# Patient Record
Sex: FEMALE | Race: WHITE | NOT HISPANIC OR LATINO | Employment: OTHER | URBAN - METROPOLITAN AREA
[De-identification: names, ages, dates, MRNs, and addresses within clinical notes are randomized per-mention and may not be internally consistent; named-entity substitution may affect disease eponyms.]

---

## 2017-06-28 ENCOUNTER — TRANSCRIBE ORDERS (OUTPATIENT)
Dept: ADMINISTRATIVE | Facility: HOSPITAL | Age: 77
End: 2017-06-28

## 2017-06-28 DIAGNOSIS — Z13.820 SCREENING FOR OSTEOPOROSIS: Primary | ICD-10-CM

## 2017-07-18 ENCOUNTER — HOSPITAL ENCOUNTER (OUTPATIENT)
Dept: RADIOLOGY | Age: 77
Discharge: HOME | End: 2017-07-18
Payer: MEDICARE

## 2017-07-18 DIAGNOSIS — Z13.820 SCREENING FOR OSTEOPOROSIS: ICD-10-CM

## 2017-07-18 DIAGNOSIS — Z12.31 ENCOUNTER FOR SCREENING MAMMOGRAM FOR MALIGNANT NEOPLASM OF BREAST: ICD-10-CM

## 2017-07-18 PROCEDURE — G0202 SCR MAMMO BI INCL CAD: HCPCS

## 2017-07-18 PROCEDURE — 77080 DXA BONE DENSITY AXIAL: CPT

## 2017-09-15 PROCEDURE — 88305 TISSUE EXAM BY PATHOLOGIST: CPT | Performed by: INTERNAL MEDICINE

## 2017-09-16 ENCOUNTER — LAB REQUISITION (OUTPATIENT)
Dept: LAB | Facility: HOSPITAL | Age: 77
End: 2017-09-16
Payer: MEDICARE

## 2017-09-16 DIAGNOSIS — D12.5 BENIGN NEOPLASM OF SIGMOID COLON: ICD-10-CM

## 2017-09-16 DIAGNOSIS — Z86.010 HISTORY OF COLONIC POLYPS: ICD-10-CM

## 2017-09-16 DIAGNOSIS — K57.30 DIVERTICULOSIS OF LARGE INTESTINE WITHOUT PERFORATION OR ABSCESS WITHOUT BLEEDING: ICD-10-CM

## 2017-11-21 ENCOUNTER — HOSPITAL ENCOUNTER (EMERGENCY)
Facility: HOSPITAL | Age: 77
Discharge: HOME | End: 2017-11-21
Attending: EMERGENCY MEDICINE | Admitting: EMERGENCY MEDICINE
Payer: MEDICARE

## 2017-11-21 VITALS
RESPIRATION RATE: 22 BRPM | HEIGHT: 66 IN | SYSTOLIC BLOOD PRESSURE: 168 MMHG | BODY MASS INDEX: 33.41 KG/M2 | DIASTOLIC BLOOD PRESSURE: 76 MMHG | WEIGHT: 207.89 LBS | HEART RATE: 56 BPM | OXYGEN SATURATION: 93 % | TEMPERATURE: 98.2 F

## 2017-11-21 DIAGNOSIS — I48.20 ATRIAL FIBRILLATION, CHRONIC (HCC): ICD-10-CM

## 2017-11-21 DIAGNOSIS — R42 DIZZY SPELLS: Primary | ICD-10-CM

## 2017-11-21 DIAGNOSIS — I16.0 HYPERTENSIVE URGENCY: ICD-10-CM

## 2017-11-21 LAB
ANION GAP SERPL CALCULATED.3IONS-SCNC: 12 MMOL/L (ref 4–13)
APTT PPP: 41 SECONDS (ref 23–35)
BASOPHILS # BLD AUTO: 0.05 THOUSANDS/ΜL (ref 0–0.1)
BASOPHILS NFR BLD AUTO: 1 % (ref 0–1)
BUN SERPL-MCNC: 15 MG/DL (ref 5–25)
CALCIUM SERPL-MCNC: 8.8 MG/DL (ref 8.3–10.1)
CHLORIDE SERPL-SCNC: 105 MMOL/L (ref 100–108)
CO2 SERPL-SCNC: 25 MMOL/L (ref 21–32)
CREAT SERPL-MCNC: 0.83 MG/DL (ref 0.6–1.3)
EOSINOPHIL # BLD AUTO: 0.24 THOUSAND/ΜL (ref 0–0.61)
EOSINOPHIL NFR BLD AUTO: 3 % (ref 0–6)
ERYTHROCYTE [DISTWIDTH] IN BLOOD BY AUTOMATED COUNT: 13.6 % (ref 11.6–15.1)
GFR SERPL CREATININE-BSD FRML MDRD: 69 ML/MIN/1.73SQ M
GLUCOSE SERPL-MCNC: 136 MG/DL (ref 65–140)
HCT VFR BLD AUTO: 43.7 % (ref 34.8–46.1)
HGB BLD-MCNC: 14.2 G/DL (ref 11.5–15.4)
INR PPP: 2.25 (ref 0.86–1.16)
LYMPHOCYTES # BLD AUTO: 1.63 THOUSANDS/ΜL (ref 0.6–4.47)
LYMPHOCYTES NFR BLD AUTO: 17 % (ref 14–44)
MCH RBC QN AUTO: 29.1 PG (ref 26.8–34.3)
MCHC RBC AUTO-ENTMCNC: 32.5 G/DL (ref 31.4–37.4)
MCV RBC AUTO: 90 FL (ref 82–98)
MONOCYTES # BLD AUTO: 0.71 THOUSAND/ΜL (ref 0.17–1.22)
MONOCYTES NFR BLD AUTO: 7 % (ref 4–12)
NEUTROPHILS # BLD AUTO: 6.91 THOUSANDS/ΜL (ref 1.85–7.62)
NEUTS SEG NFR BLD AUTO: 72 % (ref 43–75)
NT-PROBNP SERPL-MCNC: 1149 PG/ML
PLATELET # BLD AUTO: 243 THOUSANDS/UL (ref 149–390)
PMV BLD AUTO: 10.7 FL (ref 8.9–12.7)
POTASSIUM SERPL-SCNC: 3.7 MMOL/L (ref 3.5–5.3)
PROTHROMBIN TIME: 25.7 SECONDS (ref 12.1–14.4)
RBC # BLD AUTO: 4.88 MILLION/UL (ref 3.81–5.12)
SODIUM SERPL-SCNC: 142 MMOL/L (ref 136–145)
TROPONIN I SERPL-MCNC: <0.02 NG/ML
TSH SERPL DL<=0.05 MIU/L-ACNC: 4.38 UIU/ML (ref 0.36–3.74)
WBC # BLD AUTO: 9.54 THOUSAND/UL (ref 4.31–10.16)

## 2017-11-21 PROCEDURE — 93005 ELECTROCARDIOGRAM TRACING: CPT | Performed by: EMERGENCY MEDICINE

## 2017-11-21 PROCEDURE — 85610 PROTHROMBIN TIME: CPT | Performed by: EMERGENCY MEDICINE

## 2017-11-21 PROCEDURE — 99285 EMERGENCY DEPT VISIT HI MDM: CPT

## 2017-11-21 PROCEDURE — 80048 BASIC METABOLIC PNL TOTAL CA: CPT | Performed by: EMERGENCY MEDICINE

## 2017-11-21 PROCEDURE — 83880 ASSAY OF NATRIURETIC PEPTIDE: CPT | Performed by: EMERGENCY MEDICINE

## 2017-11-21 PROCEDURE — 84443 ASSAY THYROID STIM HORMONE: CPT | Performed by: EMERGENCY MEDICINE

## 2017-11-21 PROCEDURE — 85025 COMPLETE CBC W/AUTO DIFF WBC: CPT | Performed by: EMERGENCY MEDICINE

## 2017-11-21 PROCEDURE — 36415 COLL VENOUS BLD VENIPUNCTURE: CPT | Performed by: EMERGENCY MEDICINE

## 2017-11-21 PROCEDURE — 85730 THROMBOPLASTIN TIME PARTIAL: CPT | Performed by: EMERGENCY MEDICINE

## 2017-11-21 PROCEDURE — 84484 ASSAY OF TROPONIN QUANT: CPT | Performed by: EMERGENCY MEDICINE

## 2017-11-21 RX ORDER — METOPROLOL TARTRATE 50 MG/1
50 TABLET, FILM COATED ORAL EVERY 12 HOURS SCHEDULED
COMMUNITY

## 2017-11-21 RX ORDER — ATORVASTATIN CALCIUM 40 MG/1
40 TABLET, FILM COATED ORAL DAILY
COMMUNITY

## 2017-11-21 NOTE — DISCHARGE INSTRUCTIONS
Dizziness, Ambulatory Care   Shashi M: Dizziness  In: Arch Rupal, Marquis RM, [employer], et al  [place] [facility]  [address] Emergency Medicine Consult, 4th ed  [address] [facility] [place], [place], 2011  Hakeem LEAL & Jewel EE: Dizziness in the elderly  Otolaryngol Clin [facility] 2011; 33(0):711-094  Judi KEENE & Everett B: Dizzy and confused: a step-by-step evaluation of the clinician's favorite chief complaint  [address] [place] [address] 2010; 65(8):846-790  John Bela & Odette R: Dizziness and vertigo: emergencies and management  Neurol Clin 2012; 30(1):61-74  © 2014 [address] is for End User's use only and may not be sold, redistributed or otherwise used for commercial purposes  All illustrations and images included in CareNotes® are the copyrighted property of [employer] , [employer]  or Andrea Nielsen  The above information is an  only  It is not intended as medical advice for individual conditions or treatments  Talk to your doctor, nurse or pharmacist before following any medical regimen to see if it is safe and effective for you

## 2017-11-21 NOTE — ED PROVIDER NOTES
History  Chief Complaint   Patient presents with    Dizziness     Per EMS "Pt had 2 spells of dizziness today  BPs running 200's/120's, just BP medication changed, they lowered her dosages " Per Pt "dizzy spells have been happening for the last few mornings when I get out of bed" Pt also c/o SOB       History provided by:  Patient and EMS personnel  Dizziness   Quality:  Lightheadedness  Severity:  Moderate  Onset quality:  Gradual  Duration:  2 days  Timing:  Intermittent  Progression:  Waxing and waning  Chronicity:  New  Context: standing up    Relieved by:  Being still  Worsened by:  Being still, standing up and turning head  Ineffective treatments:  None tried  Associated symptoms: no blood in stool, no chest pain, no diarrhea, no headaches, no hearing loss, no nausea, no palpitations, no shortness of breath, no vision changes, no vomiting and no weakness        Prior to Admission Medications   Prescriptions Last Dose Informant Patient Reported? Taking?   atorvastatin (LIPITOR) 40 mg tablet   Yes Yes   Sig: Take 40 mg by mouth daily   furosemide (LASIX) 20 mg tablet   Yes Yes   Sig: Take 20 mg by mouth daily  levothyroxine 50 mcg tablet   Yes Yes   Sig: Take 50 mcg by mouth daily  lisinopril (ZESTRIL) 20 mg tablet   Yes Yes   Sig: Take 20 mg by mouth daily  metoprolol tartrate (LOPRESSOR) 50 mg tablet   Yes Yes   Sig: Take 50 mg by mouth every 12 (twelve) hours   perphenazine-amitriptyline 2-10 MG TABS   Yes Yes   Sig: Take 2 tablets by mouth daily  potassium chloride (K-DUR) 10 mEq tablet   Yes Yes   Sig: Take 10 mEq by mouth daily  warfarin (COUMADIN) 4 mg tablet   Yes Yes   Sig: Take 4 mg by mouth daily   Monday-friday      Facility-Administered Medications: None       Past Medical History:   Diagnosis Date    Anxiety     Asthma     Atrial fibrillation (HCC)     Cardiac disease     CHF [facility]congestive [facility] failure) ([facility] [address] )     Depression 5/22/2016    HLD (hyperlipidemia) 5/22/2016    HTN (hypertension), benign 5/22/2016    Hyperlipidemia     Hypertension     Hypothyroidism 5/22/2016    Stroke [facility])        Past Surgical History:   Procedure Laterality Date    HYSTERECTOMY      TONSILLECTOMY         History reviewed  No pertinent family history  I have reviewed and agree with the history as documented  Social History   Substance Use Topics    Smoking status: Former Smoker     Types: Cigarettes     Quit date: 1985    Smokeless tobacco: Never Used    Alcohol use No        Review of Systems   Constitutional: Negative for activity change, chills, diaphoresis and fever  HENT: Negative for congestion, hearing loss, sinus pressure and sore throat  Eyes: Negative for pain and visual disturbance  Respiratory: Negative for cough, chest tightness, shortness of breath, wheezing and stridor  Cardiovascular: Negative for chest pain and palpitations  Gastrointestinal: Negative for abdominal distention, abdominal pain, blood in stool, constipation, diarrhea, nausea and vomiting  Genitourinary: Negative for dysuria and frequency  Musculoskeletal: Negative for neck pain and neck stiffness  Skin: Negative for rash  Neurological: Positive for dizziness  Negative for speech difficulty, weakness, light-headedness, numbness and headaches  Physical Exam  ED Triage Vitals [11/21/17 1516]   Temperature Pulse Respirations Blood Pressure SpO2   98 2 °F (36 8 °C) 68 22 (!) 184/75 94 %      Temp Source Heart Rate Source Patient Position - Orthostatic VS BP Location FiO2 (%)   Oral Monitor Lying Right arm --      Pain Score       --           Orthostatic Vital Signs  Vitals:    11/21/17 1516 11/21/17 1530 11/21/17 1600   BP: (!) 184/75 (!) 174/79 168/76   Pulse: 68 56 56   Patient Position - Orthostatic VS: Lying Lying Lying       Physical Exam   Constitutional: She is oriented to person, place, and time  She appears well-developed  No distress  HENT:   Head: Normocephalic and atraumatic  Eyes: Pupils are equal, round, and reactive to light  Neck: Normal range of motion  Neck supple  No tracheal deviation present  Cardiovascular: Normal rate, normal heart sounds and intact distal pulses  An irregularly irregular rhythm present  No murmur heard  Pulmonary/Chest: Effort normal and breath sounds normal  No stridor  No respiratory distress  Abdominal: Soft  She exhibits no distension  There is no tenderness  There is no rebound and no guarding  Musculoskeletal: Normal range of motion  Neurological: She is alert and oriented to person, place, and time  Skin: Skin is warm and dry  She is not diaphoretic  No erythema  No pallor  Psychiatric: She has a normal mood and affect  Vitals reviewed  ED Medications  Medications - No data to display    Diagnostic Studies  Results Reviewed     Procedure Component Value Units Date/Time    APTT [[id number]]  (Abnormal) Collected:  11/21/17 1528    Lab Status:  Final result Specimen:  Blood from Arm, Left Updated:  11/21/17 1610     PTT 41 (H) seconds     Narrative: Therapeutic Heparin Range = 60-90 seconds    Protime-INR [[id number]]  (Abnormal) Collected:  11/21/17 1528    Lab Status:  Final result Specimen:  Blood from Arm, Left Updated:  11/21/17 1610     Protime 25 7 (H) seconds      INR 2 25 (H)    Basic metabolic panel [[id number]] Collected:  11/21/17 1528    Lab Status:  Final result Specimen:  Blood from Arm, Left Updated:  11/21/17 1601     Sodium 142 mmol/L      Potassium 3 7 mmol/L      Chloride 105 mmol/L      CO2 25 mmol/L      Anion Gap 12 mmol/L      BUN 15 mg/dL      Creatinine 0 83 mg/dL      Glucose 136 mg/dL      Calcium 8 8 mg/dL      eGFR 69 ml/min/1 73sq m     Narrative:         National Kidney Disease Education Program recommendations are as follows:  GFR calculation is accurate only with a steady state creatinine  Chronic Kidney disease less than 60 ml/min/1 73 sq  meters  Kidney failure less than 15 ml/min/1 73 sq  meters  TSH [[id number]]  (Abnormal) Collected:  11/21/17 1528    Lab Status:  Final result Specimen:  Blood from Arm, Left Updated:  11/21/17 1601     TSH 3RD GENERATON 4 375 (H) uIU/mL     Narrative:         Patients undergoing fluorescein dye angiography may retain small amounts of fluorescein in the body for 48-72 hours post procedure  Samples containing fluorescein can produce falsely depressed TSH values  If the patient had this procedure,a specimen should be resubmitted post fluorescein clearance  The recommended reference ranges for TSH during pregnancy are as follows:  First trimester 0 1 to 2 5 uIU/mL  Second trimester  0 2 to 3 0 uIU/mL  Third trimester 0 3 to 3 0 uIU/m      B-type natriuretic peptide [[id number]]  (Abnormal) Collected:  11/21/17 1528    Lab Status:  Final result Specimen:  Blood from Arm, Left Updated:  11/21/17 1601     NT-proBNP 1,149 (H) pg/mL     Troponin I [[id number]]  (Normal) Collected:  11/21/17 1528    Lab Status:  Final result Specimen:  Blood from Arm, Left Updated:  11/21/17 1555     Troponin I <0 02 ng/mL     Narrative:         [employer] Chemistry analyzer 99% cutoff is > 0 04 ng/mL in network labs    o cTnI 99% cutoff is useful only when applied to patients in the clinical setting of myocardial ischemia  o cTnI 99% cutoff should be interpreted in the context of clinical history, ECG findings and possibly cardiac imaging to establish correct diagnosis  o cTnI 99% cutoff may be suggestive but clearly not indicative of a coronary event without the clinical setting of myocardial ischemia      CBC and differential [[id number]]  (Normal) Collected:  11/21/17 1528    Lab Status:  Final result Specimen:  Blood from Arm, Left Updated:  11/21/17 1543     WBC 9 54 Thousand/uL      RBC 4 88 Million/uL      Hemoglobin 14 2 g/dL      Hematocrit 43 7 %      MCV 90 fL      MCH 29 1 pg      MCHC 32 5 g/dL      RDW 13 6 %      MPV 10 7 fL      Platelets 087 Thousands/uL      Neutrophils Relative 72 %      Lymphocytes Relative 17 %      Monocytes Relative 7 %      Eosinophils Relative 3 %      Basophils Relative 1 %      Neutrophils Absolute 6 91 Thousands/µL      Lymphocytes Absolute 1 63 Thousands/µL      Monocytes Absolute 0 71 Thousand/µL      Eosinophils Absolute 0 24 Thousand/µL      Basophils Absolute 0 05 Thousands/µL                  No orders to display              Procedures  ECG 12 Lead Documentation  Date/Time: 11/21/2017 3:24 PM  Performed by: Courtney Vanegas  Authorized by: Courtney Vanegas     ECG reviewed by me, the ED Provider: yes    Patient location:  ED  Previous ECG:     Previous ECG:  Compared to current    Comparison ECG info:  5 21 2016    Similarity:  No change  Interpretation:     Interpretation: non-specific    Rate:     ECG rate:  62    ECG rate assessment: normal    Rhythm:     Rhythm: atrial fibrillation    Ectopy:     Ectopy: none    QRS:     QRS axis:  Left    QRS intervals: Wide  Conduction:     Conduction: abnormal      Abnormal conduction: complete LBBB    ST segments:     ST segments:  Non-specific  T waves:     T waves: non-specific               Phone Contacts  ED Phone Contact    ED Course  ED Course as of Nov 21 1625   Tue Nov 21, 2017   1624   Patient reports complete resolution of symptoms feeling well blood pressure has decreased steadily since being here, plan will discharge                                MDM  Number of Diagnoses or Management Options  Atrial fibrillation, chronic ([facility] [address] ): minor  Dizzy spells: new and requires workup  Hypertensive urgency: new and requires workup  Diagnosis management comments: 53-year-old female presents home, intermittent episodes of dizziness over the past 2 days  Currently feels completely asymptomatic    No falls no trauma no headache no chest pain     DDx includes but is not limited to:   Electrolyte abnormality, anemia, less likely cardiac in etiology will check      Initial ED Plan:   Blood  Work,  if workup unremarkable patient feels comfortable being discharged       Amount and/or Complexity of Data Reviewed  Clinical lab tests: ordered and reviewed  Decide to obtain previous medical records or to obtain history from someone other than the patient: yes  Obtain history from someone other than the patient: yes  Review and summarize past medical records: yes  Independent visualization of images, tracings, or specimens: yes      CritCare Time    Disposition  Final diagnoses:   Dizzy spells   [facility] ([facility] )   Hypertensive urgency     Time reflects when diagnosis was documented in both MDM as applicable and the Disposition within this note     Time User Action Codes Description Comment    11/21/2017  4:24 PM Odell Saint Add [R42] Dizzy spells     11/21/2017  4:25 PM Karla SCOTT Add [I48 2] Atrial fibrillation, chronic ([facility] )     11/21/2017  4:25 PM Odell Saint Add [I16 0] Hypertensive urgency       ED Disposition     ED Disposition Condition Comment    Discharge  [place] discharge to home/self care  Condition at discharge: Good        Follow-up Information     Follow up With Specialties Details Why Rohan Schaefer MD Internal Medicine Call To arrange for the next available appointment [address]   [phone]  [phone]          Patient's Medications   Discharge Prescriptions    No medications on file     No discharge procedures on file      ED Provider  Electronically Signed by           Shamar Salinas DO  11/21/17 9137

## 2017-11-22 LAB
ATRIAL RATE: 69 BPM
QRS AXIS: -69 DEGREES
QRSD INTERVAL: 150 MS
QT INTERVAL: 456 MS
QTC INTERVAL: 464 MS
T WAVE AXIS: 101 DEGREES
VENTRICULAR RATE: 62 BPM

## 2018-07-24 ENCOUNTER — HOSPITAL ENCOUNTER (OUTPATIENT)
Dept: RADIOLOGY | Age: 78
Discharge: HOME | End: 2018-07-24
Payer: MEDICARE

## 2018-07-24 DIAGNOSIS — Z12.31 ENCOUNTER FOR SCREENING MAMMOGRAM FOR MALIGNANT NEOPLASM OF BREAST: ICD-10-CM

## 2018-07-24 PROCEDURE — 77067 SCR MAMMO BI INCL CAD: CPT

## 2018-07-24 PROCEDURE — 77063 BREAST TOMOSYNTHESIS BI: CPT

## 2019-01-23 NOTE — ED NOTES
Pt awake and alert, no distress noted, no other questions upon d/c     April JAYNE Steiner, RN  11/21/17 3830 Yes

## 2019-07-26 ENCOUNTER — HOSPITAL ENCOUNTER (OUTPATIENT)
Dept: RADIOLOGY | Age: 79
Discharge: HOME | End: 2019-07-26
Payer: COMMERCIAL

## 2019-07-26 VITALS — WEIGHT: 187 LBS | BODY MASS INDEX: 30.05 KG/M2 | HEIGHT: 66 IN

## 2019-07-26 DIAGNOSIS — Z12.31 ENCOUNTER FOR SCREENING MAMMOGRAM FOR MALIGNANT NEOPLASM OF BREAST: ICD-10-CM

## 2019-07-26 PROCEDURE — 77063 BREAST TOMOSYNTHESIS BI: CPT

## 2019-07-26 PROCEDURE — 77067 SCR MAMMO BI INCL CAD: CPT

## 2020-07-29 ENCOUNTER — HOSPITAL ENCOUNTER (OUTPATIENT)
Dept: RADIOLOGY | Age: 80
Discharge: HOME | End: 2020-07-29

## 2020-07-29 VITALS — WEIGHT: 185 LBS | HEIGHT: 65 IN | BODY MASS INDEX: 30.82 KG/M2

## 2020-07-29 DIAGNOSIS — Z12.31 ENCOUNTER FOR SCREENING MAMMOGRAM FOR MALIGNANT NEOPLASM OF BREAST: ICD-10-CM

## 2020-07-29 PROCEDURE — 77067 SCR MAMMO BI INCL CAD: CPT

## 2020-07-29 PROCEDURE — 77063 BREAST TOMOSYNTHESIS BI: CPT

## 2021-02-14 ENCOUNTER — IMMUNIZATIONS (OUTPATIENT)
Dept: FAMILY MEDICINE CLINIC | Facility: HOSPITAL | Age: 81
End: 2021-02-14

## 2021-02-14 DIAGNOSIS — Z23 ENCOUNTER FOR IMMUNIZATION: Primary | ICD-10-CM

## 2021-02-14 PROCEDURE — 91300 SARS-COV-2 / COVID-19 MRNA VACCINE (PFIZER-BIONTECH) 30 MCG: CPT

## 2021-02-14 PROCEDURE — 0001A SARS-COV-2 / COVID-19 MRNA VACCINE (PFIZER-BIONTECH) 30 MCG: CPT

## 2021-03-05 ENCOUNTER — IMMUNIZATIONS (OUTPATIENT)
Dept: FAMILY MEDICINE CLINIC | Facility: HOSPITAL | Age: 81
End: 2021-03-05

## 2021-03-05 DIAGNOSIS — Z23 ENCOUNTER FOR IMMUNIZATION: Primary | ICD-10-CM

## 2021-03-05 PROCEDURE — 0002A SARS-COV-2 / COVID-19 MRNA VACCINE (PFIZER-BIONTECH) 30 MCG: CPT

## 2021-03-05 PROCEDURE — 91300 SARS-COV-2 / COVID-19 MRNA VACCINE (PFIZER-BIONTECH) 30 MCG: CPT

## 2021-08-27 ENCOUNTER — HOSPITAL ENCOUNTER (OUTPATIENT)
Dept: RADIOLOGY | Age: 81
Discharge: HOME | End: 2021-08-27
Payer: MEDICARE

## 2021-08-27 VITALS — BODY MASS INDEX: 31.32 KG/M2 | HEIGHT: 65 IN | WEIGHT: 188 LBS

## 2021-08-27 DIAGNOSIS — Z12.31 ENCOUNTER FOR SCREENING MAMMOGRAM FOR MALIGNANT NEOPLASM OF BREAST: ICD-10-CM

## 2021-08-27 DIAGNOSIS — Z78.0 ASYMPTOMATIC MENOPAUSAL STATE: ICD-10-CM

## 2021-08-27 PROCEDURE — 77080 DXA BONE DENSITY AXIAL: CPT

## 2021-08-27 PROCEDURE — 77067 SCR MAMMO BI INCL CAD: CPT

## 2021-08-27 PROCEDURE — 77063 BREAST TOMOSYNTHESIS BI: CPT

## 2021-11-16 ENCOUNTER — IMMUNIZATIONS (OUTPATIENT)
Dept: FAMILY MEDICINE CLINIC | Facility: HOSPITAL | Age: 81
End: 2021-11-16

## 2021-11-16 DIAGNOSIS — Z23 ENCOUNTER FOR IMMUNIZATION: Primary | ICD-10-CM

## 2021-11-16 PROCEDURE — 0001A COVID-19 PFIZER VACC 0.3 ML: CPT

## 2021-11-16 PROCEDURE — 91300 COVID-19 PFIZER VACC 0.3 ML: CPT

## 2023-04-02 ENCOUNTER — APPOINTMENT (OUTPATIENT)
Dept: RADIOLOGY | Facility: HOSPITAL | Age: 83
End: 2023-04-02

## 2023-04-02 ENCOUNTER — HOSPITAL ENCOUNTER (EMERGENCY)
Facility: HOSPITAL | Age: 83
Discharge: HOME | End: 2023-04-02
Attending: SURGERY

## 2023-04-02 ENCOUNTER — APPOINTMENT (EMERGENCY)
Dept: CT IMAGING | Facility: HOSPITAL | Age: 83
End: 2023-04-02

## 2023-04-02 VITALS
WEIGHT: 183.42 LBS | DIASTOLIC BLOOD PRESSURE: 76 MMHG | SYSTOLIC BLOOD PRESSURE: 184 MMHG | HEART RATE: 53 BPM | BODY MASS INDEX: 31 KG/M2 | RESPIRATION RATE: 16 BRPM | TEMPERATURE: 98 F | OXYGEN SATURATION: 94 %
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
## 2023-04-02 DIAGNOSIS — S00.03XA SCALP HEMATOMA, INITIAL ENCOUNTER: ICD-10-CM

## 2023-04-02 PROBLEM — R19.7 DIARRHEA: Status: ACTIVE | Noted: 2023-04-02

## 2023-04-02 LAB
ABO GROUP BLD: NORMAL
ANION GAP SERPL CALCULATED.3IONS-SCNC: 8 MMOL/L (ref 4–13)
APTT PPP: 43 SECONDS (ref 23–37)
ATRIAL RATE: 59 BPM
BASE EXCESS BLDA CALC-SCNC: 7 MMOL/L (ref -2–3)
BASOPHILS # BLD AUTO: 0.07 THOUSANDS/ÂΜL (ref 0–0.1)
BASOPHILS NFR BLD AUTO: 1 % (ref 0–1)
BLD GP AB SCN SERPL QL: NEGATIVE
BUN SERPL-MCNC: 19 MG/DL (ref 5–25)
CA-I BLD-SCNC: 1.19 MMOL/L (ref 1.12–1.32)
CALCIUM SERPL-MCNC: 9.9 MG/DL (ref 8.4–10.2)
CHLORIDE SERPL-SCNC: 104 MMOL/L (ref 96–108)
CO2 SERPL-SCNC: 28 MMOL/L (ref 21–32)
CREAT SERPL-MCNC: 1.02 MG/DL (ref 0.6–1.3)
EOSINOPHIL # BLD AUTO: 0.22 THOUSAND/ÂΜL (ref 0–0.61)
EOSINOPHIL NFR BLD AUTO: 2 % (ref 0–6)
ERYTHROCYTE [DISTWIDTH] IN BLOOD BY AUTOMATED COUNT: 13.2 % (ref 11.6–15.1)
GFR SERPL CREATININE-BSD FRML MDRD: 52 ML/MIN/1.73SQ M
GLUCOSE SERPL-MCNC: 132 MG/DL (ref 65–140)
GLUCOSE SERPL-MCNC: 137 MG/DL (ref 65–140)
HCO3 BLDA-SCNC: 30.9 MMOL/L (ref 24–30)
HCT VFR BLD AUTO: 42.9 % (ref 34.8–46.1)
HCT VFR BLD CALC: 40 % (ref 34.8–46.1)
HGB BLD-MCNC: 13.5 G/DL (ref 11.5–15.4)
HGB BLDA-MCNC: 13.6 G/DL (ref 11.5–15.4)
IMM GRANULOCYTES # BLD AUTO: 0.04 THOUSAND/UL (ref 0–0.2)
IMM GRANULOCYTES NFR BLD AUTO: 0 % (ref 0–2)
INR PPP: 2.68 (ref 0.84–1.19)
LYMPHOCYTES # BLD AUTO: 1.51 THOUSANDS/ÂΜL (ref 0.6–4.47)
LYMPHOCYTES NFR BLD AUTO: 17 % (ref 14–44)
MCH RBC QN AUTO: 29 PG (ref 26.8–34.3)
MCHC RBC AUTO-ENTMCNC: 31.5 G/DL (ref 31.4–37.4)
MCV RBC AUTO: 92 FL (ref 82–98)
MONOCYTES # BLD AUTO: 0.73 THOUSAND/ÂΜL (ref 0.17–1.22)
MONOCYTES NFR BLD AUTO: 8 % (ref 4–12)
NEUTROPHILS # BLD AUTO: 6.53 THOUSANDS/ÂΜL (ref 1.85–7.62)
NEUTS SEG NFR BLD AUTO: 72 % (ref 43–75)
NRBC BLD AUTO-RTO: 0 /100 WBCS
PCO2 BLD: 32 MMOL/L (ref 21–32)
PCO2 BLD: 41.6 MM HG (ref 42–50)
PH BLD: 7.48 [PH] (ref 7.3–7.4)
PLATELET # BLD AUTO: 230 THOUSANDS/UL (ref 149–390)
PMV BLD AUTO: 10.8 FL (ref 8.9–12.7)
PO2 BLD: 31 MM HG (ref 35–45)
POTASSIUM BLD-SCNC: 4.7 MMOL/L (ref 3.5–5.3)
POTASSIUM SERPL-SCNC: 3.8 MMOL/L (ref 3.5–5.3)
PROTHROMBIN TIME: 28.8 SECONDS (ref 11.6–14.5)
QRS AXIS: -72 DEGREES
QRSD INTERVAL: 150 MS
QT INTERVAL: 484 MS
QTC INTERVAL: 463 MS
RBC # BLD AUTO: 4.65 MILLION/UL (ref 3.81–5.12)
RH BLD: POSITIVE
SAO2 % BLD FROM PO2: 64 % (ref 60–85)
SODIUM BLD-SCNC: 141 MMOL/L (ref 136–145)
SODIUM SERPL-SCNC: 140 MMOL/L (ref 135–147)
SPECIMEN EXPIRATION DATE: NORMAL
SPECIMEN SOURCE: ABNORMAL
T WAVE AXIS: 98 DEGREES
VENTRICULAR RATE: 55 BPM
WBC # BLD AUTO: 9.1 THOUSAND/UL (ref 4.31–10.16)

## 2023-04-02 NOTE — ASSESSMENT & PLAN NOTE
· Patient has a history of A-fib for which she takes Coumadin daily  · PT/INR was within therapeutic limits

## 2023-04-02 NOTE — ASSESSMENT & PLAN NOTE
· Patient had a mechanical fall from standing with head strike resulting in a scalp hematoma over the left occiput  · CT scan of her head was negative for any acute intracranial abnormalities

## 2023-04-02 NOTE — H&P
[facility]  H&P Note  Name: Ariela Baltazar  MRN: [id number]  Unit/Bed#: ED-19 I Date of Admission: 4/2/2023   Date of Service: 4/2/2023 I Hospital Day: 0    Assessment/Plan   Diarrhea  Assessment & Plan  · Patient notes having recurrent episodes of watery diarrhea over the last 6 days  · Per chart review, she is being evaluated for chronic diarrhea by her PCP and is currently taking Imodium, she also has an ambulatory referral to the GI specialist  · No bloody stools, no fevers or chills  · Blood work does not show any significant electrolyte derangements  · No further work-up warranted at this time, follow-up with PCP    Scalp hematoma, initial encounter  Assessment & Plan  · Patient had a mechanical fall from standing with head strike resulting in a scalp hematoma over the left occiput  · CT scan of her head was negative for any acute intracranial abnormalities    Atrial fibrillation with RVR ([address] )  Assessment & Plan  · Patient has a history of A-fib for which she takes Coumadin daily  · PT/INR was within therapeutic limits    * Fall  Assessment & Plan  · Mechanical fall at home with head strike resulting in head contusion/scalp hematoma  · CT of the head was negative, blood work was unremarkable, patient able to ambulate without difficulty  · Patient stable for discharge home        Trauma Alert: Level B   Model of Arrival: Ambulance    Trauma Team: Attending Dr Melchor Gabriel and Residents Dr Corina Medina  Consultants:     None     History of Present Illness     Chief Complaint: Fall with head strike, scalp hematoma/head contusion  Mechanism:Fall     HPI:    Cristian Long is a [de-identified] y o  female who presents with a scalp hematoma/head contusion after a mechanical fall at home, the patient takes Coumadin for A-fib  The patient denies any other injuries or tenderness in any other areas  She denies any C-spine tenderness    Patient notes that she has had watery diarrhea over the last 6 days   No significant abdominal tenderness at this time  No nausea or vomiting  No fevers or chills  Patient's trauma work-up was unremarkable  Her blood work was also unremarkable  Patient was able to ambulate in the emergency department without any difficulty, she was stable for discharge back home  Review of Systems   Constitutional: Negative for chills and fever  HENT: Negative for congestion and sinus pain  Eyes: Negative for photophobia and visual disturbance  Respiratory: Negative for chest tightness and shortness of breath  Cardiovascular: Negative for chest pain and palpitations  Gastrointestinal: Positive for diarrhea  Negative for abdominal pain and nausea  Genitourinary: Negative for dysuria and hematuria  Musculoskeletal: Negative for arthralgias, back pain, neck pain and neck stiffness  Skin: Negative for rash and wound  Neurological: Negative for dizziness, syncope, weakness, light-headedness and headaches  Psychiatric/Behavioral: Negative for agitation and confusion  12-point, complete review of systems was reviewed and negative except as stated above       Historical Information     Past Medical History:   Diagnosis Date   • Anxiety    • Asthma    • Atrial fibrillation [facility])    • Cardiac disease    • [facility] )    • Depression 2016   • HLD (hyperlipidemia) 2016   • HTN (hypertension), benign 2016   • Hyperlipidemia    • Hypertension    • Hypothyroidism 2016   • Stroke [facility])      Past Surgical History:   Procedure Laterality Date   • BREAST EXCISIONAL BIOPSY Right     benign   • TONSILLECTOMY     • TOTAL ABDOMINAL HYSTERECTOMY Bilateral     age 48   • TOTAL ABDOMINAL HYSTERECTOMY W/ BILATERAL SALPINGOOPHORECTOMY Bilateral     age 48        Social History     Tobacco Use   • Smoking status: Former     Types: Cigarettes     Quit date:      Years since quittin 2   • Smokeless tobacco: Never   Substance Use Topics   • Alcohol use: No   • Drug use: No     Immunization History   Administered Date(s) Administered   • COVID-19 PFIZER VACCINE 0 3 ML IM 02/14/2021, 03/05/2021, 11/16/2021     Last Tetanus: unclear  Family History: Non-contributory    1  Before the illness or injury that brought you to the Emergency, did you need someone to help you on a regular basis? 0=No   2  Since the illness or injury that brought you to the Emergency, have you needed more help than usual to take care of yourself? 0=No   3  Have you been hospitalized for one or more nights during the past 6 months (excluding a stay in the Emergency Department)? 0=No   4  In general, do you see well? 0=Yes   5  In general, do you have serious problems with your memory? 0=No   6  Do you take more than three different medications everyday? 1=Yes   TOTAL   1     Did you order a geriatric consult if the score was 2 or greater?: n/a     Meds/Allergies   all current active meds have been reviewed  Allergies have not been reviewed;     Allergies   Allergen Reactions   • Penicillins Dermatitis       Objective   Initial Vitals:   Temperature: 98 °F (36 7 °C) (04/02/23 1647)  Pulse: 65 (04/02/23 1647)  Respirations: 22 (04/02/23 1647)  Blood Pressure: (!) 174/97 (04/02/23 1647)    Primary Survey:   Airway:        Status: patent;        Pre-hospital Interventions: none        Hospital Interventions: none  Breathing:        Pre-hospital Interventions: none       Effort: normal       Right breath sounds: normal       Left breath sounds: normal  Circulation:        Rhythm: irregular       Rate: regular   Right Pulses Left Pulses    R radial: 2+  R femoral: 2+       L radial: 2+  L femoral: 2+         Disability:        GCS: Eye: 4; Verbal: 5 Motor: 6 Total: 15       Right Pupil: 4 mm;  round;  reactive         Left Pupil:  4 mm;  round;  reactive      R Motor Strength L Motor Strength    R : 5/5  R dorsiflex: 5/5  R plantarflex: 5/5 L : 5/5  L dorsiflex: 5/5  L plantarflex: 5/5        Sensory:  No sensory deficit  Exposure:       Completed: Yes      Secondary Survey:  Physical Exam  Vitals and nursing note reviewed  Constitutional:       General: She is not in acute distress  HENT:      Head: Normocephalic  Contusion present  Comments: Hematoma/contusion over left occiput     Right Ear: External ear normal       Left Ear: External ear normal       Nose: No congestion or rhinorrhea  Mouth/Throat:      Mouth: Mucous membranes are moist       Pharynx: Oropharynx is clear  Eyes:      Extraocular Movements: Extraocular movements intact  Pupils: Pupils are equal, round, and reactive to light  Cardiovascular:      Rate and Rhythm: Normal rate  Rhythm irregular  Pulses: Normal pulses  Heart sounds: Normal heart sounds  Pulmonary:      Effort: Pulmonary effort is normal  No respiratory distress  Breath sounds: Normal breath sounds  Abdominal:      General: Abdomen is flat  Palpations: Abdomen is soft  Tenderness: There is no abdominal tenderness  Musculoskeletal:         General: No swelling, tenderness or signs of injury  Normal range of motion  Cervical back: Normal range of motion  No rigidity or tenderness  Skin:     General: Skin is warm  Findings: No bruising  Neurological:      General: No focal deficit present  Mental Status: She is alert and oriented to person, place, and time     Psychiatric:         Mood and Affect: Mood normal          Behavior: Behavior normal          Invasive Devices     Peripheral Intravenous Line  Duration           Peripheral IV 04/02/23 Left Antecubital <1 day              Lab Results:   Results: I have personally reviewed all pertinent laboratory/tests results, BMP/CMP:   Lab Results   Component Value Date    SODIUM 140 04/02/2023    K 3 8 04/02/2023     04/02/2023    CO2 28 04/02/2023    CO2 32 04/02/2023    BUN 19 04/02/2023    CREATININE 1 02 04/02/2023 GLUCOSE 137 04/02/2023    CALCIUM 9 9 04/02/2023    EGFR 52 04/02/2023   , CBC:   Lab Results   Component Value Date    WBC 9 10 04/02/2023    HGB 13 5 04/02/2023    HGB 13 6 04/02/2023    HCT 42 9 04/02/2023    HCT 40 04/02/2023    MCV 92 04/02/2023     04/02/2023    MCH 29 0 04/02/2023    MCHC 31 5 04/02/2023    RDW 13 2 04/02/2023    MPV 10 8 04/02/2023    NRBC 0 04/02/2023    and Coagulation:   Lab Results   Component Value Date    INR 2 68 (H) 04/02/2023       Imaging Results: I have personally reviewed pertinent reports  Chest Xray(s): negative for acute findings   FAST exam(s): negative for acute findings   CT Scan(s): negative for acute findings   Additional Xray(s): N/A     Other Studies: N/A    Code Status: Prior  Advance Directive and Living Will:      Power of :    POLST:    I have spent 45 minutes with Patient  today in which greater than 50% of this time was spent in counseling/coordination of care regarding Diagnostic results, Prognosis, Impressions, Counseling / Coordination of care, Documenting in the medical record, Reviewing / ordering tests, medicine, procedures  , Obtaining or reviewing history   and Communicating with other healthcare professionals

## 2023-04-02 NOTE — PROCEDURES
POC FAST US    Date/Time: 4/2/2023 4:57 PM  Performed by: Steven Fagan DO  Authorized by: Steven Fagan DO     Patient location:  Trauma  Other Assisting Provider: No    Procedure details:     Exam Type:  Diagnostic    Indications: blunt abdominal trauma      Assess for:  Intra-abdominal fluid and pericardial effusion    Technique: FAST      Views obtained:  Heart - Pericardial sac, RUQ - Davison's Pouch, LUQ - Splenorenal space and Suprapubic - Pouch of Hari    Image quality: diagnostic      Image availability:  Video obtained and images available in PACS  FAST Findings:     RUQ (Hepatorenal) free fluid: absent      LUQ (Splenorenal) free fluid: absent      Suprapubic free fluid: absent      Cardiac wall motion: identified      Pericardial effusion: absent    Interpretation:     Impressions: negative

## 2023-04-02 NOTE — ASSESSMENT & PLAN NOTE
· Patient notes having recurrent episodes of watery diarrhea over the last 6 days  · Per chart review, she is being evaluated for chronic diarrhea by her PCP and is currently taking Imodium, she also has an ambulatory referral to the GI specialist  · No bloody stools, no fevers or chills  · Blood work does not show any significant electrolyte derangements  · No further work-up warranted at this time, follow-up with PCP

## 2023-04-02 NOTE — DISCHARGE INSTRUCTIONS
Schedule a follow-up appointment with your PCP as needed  Return to the emergency department should you develop any worsening headache, dizziness, episodes of fainting, confusion, focal numbness or weakness, or any other concerning signs or symptoms

## 2023-04-02 NOTE — ED NOTES
Daisha ordered via round trip     [facility]  04/02/23 3373

## 2023-04-02 NOTE — ASSESSMENT & PLAN NOTE
· Mechanical fall at home with head strike resulting in head contusion/scalp hematoma  · CT of the head was negative, blood work was unremarkable, patient able to ambulate without difficulty  · Patient stable for discharge home

## 2023-04-02 NOTE — QUICK NOTE
On arrival to the trauma bay, the patient did not have any C-spine tenderness, she was able to rotate her head to the left and right 45 degrees without any tenderness, C-spine cleared

## 2023-04-02 NOTE — ED PROVIDER NOTES
Emergency Department Airway Evaluation and Management Form    History  Obtained from: EMS and patient  [place]  No chief complaint on file  72-year-old female, presenting with fall and head injury  Past Medical History:   Diagnosis Date   • Anxiety    • Asthma    • Atrial fibrillation [facility])    • Cardiac disease    • CHF (congestive heart failure) ([facility] [address] )    • Depression 2016   • HLD (hyperlipidemia) 2016   • HTN (hypertension), benign 2016   • Hyperlipidemia    • Hypertension    • Hypothyroidism 2016   • Stroke [facility])      Past Surgical History:   Procedure Laterality Date   • BREAST EXCISIONAL BIOPSY Right     benign   • TONSILLECTOMY     • TOTAL ABDOMINAL HYSTERECTOMY Bilateral     age 48   • TOTAL ABDOMINAL HYSTERECTOMY W/ BILATERAL SALPINGOOPHORECTOMY Bilateral     age 48     Family History   Problem Relation Age of Onset   • No Known Problems Mother    • No Known Problems Father    • No Known Problems Sister    • Stomach cancer Maternal Grandmother 71   • No Known Problems Maternal Grandfather    • No Known Problems Paternal Grandmother    • No Known Problems Paternal Grandfather      Social History     Tobacco Use   • Smoking status: Former     Types: Cigarettes     Quit date:      Years since quittin 2   • Smokeless tobacco: Never   Substance Use Topics   • Alcohol use: No   • Drug use: No     I have reviewed and agree with the history as documented  Review of Systems    Physical Exam  There were no vitals taken for this visit  Physical Exam  Constitutional:       General: She is not in acute distress  Pulmonary:      Effort: Pulmonary effort is normal       Breath sounds: Normal breath sounds  Musculoskeletal:      Cervical back: Normal range of motion and neck supple  No tenderness  Neurological:      General: No focal deficit present  Mental Status: She is alert and oriented to person, place, and time           ED Medications  Medications - No data to display    Intubation  Procedures    Notes  Airway intact, patient with normal respiratory effort and speaking full sentences      Final Diagnosis  Final diagnoses:   None       ED Provider  Electronically Signed by     Ceasar Mike MD  04/02/23 1363

## 2023-11-13 ENCOUNTER — HOSPITAL ENCOUNTER (EMERGENCY)
Facility: HOSPITAL | Age: 83
Discharge: HOME | End: 2023-11-13
Attending: EMERGENCY MEDICINE
Payer: MEDICARE

## 2023-11-13 ENCOUNTER — APPOINTMENT (EMERGENCY)
Dept: RADIOLOGY | Facility: HOSPITAL | Age: 83
End: 2023-11-13
Payer: MEDICARE

## 2023-11-13 VITALS
RESPIRATION RATE: 18 BRPM | HEART RATE: 69 BPM | TEMPERATURE: 98.2 F | DIASTOLIC BLOOD PRESSURE: 89 MMHG | SYSTOLIC BLOOD PRESSURE: 139 MMHG | OXYGEN SATURATION: 90 %

## 2023-11-13 DIAGNOSIS — R60.9 PERIPHERAL EDEMA: Primary | ICD-10-CM

## 2023-11-13 DIAGNOSIS — I48.91 ATRIAL FIBRILLATION WITH RVR (HCC): Chronic | ICD-10-CM

## 2023-11-13 LAB
ALBUMIN SERPL BCP-MCNC: 4.6 G/DL (ref 3.5–5)
ALP SERPL-CCNC: 81 U/L (ref 34–104)
ALT SERPL W P-5'-P-CCNC: 15 U/L (ref 7–52)
ANION GAP SERPL CALCULATED.3IONS-SCNC: 8 MMOL/L
APTT PPP: 35 SECONDS (ref 23–37)
AST SERPL W P-5'-P-CCNC: 21 U/L (ref 13–39)
BASOPHILS # BLD AUTO: 0.09 THOUSANDS/ÂΜL (ref 0–0.1)
BASOPHILS NFR BLD AUTO: 1 % (ref 0–1)
BILIRUB SERPL-MCNC: 1.06 MG/DL (ref 0.2–1)
BNP SERPL-MCNC: 335 PG/ML (ref 0–100)
BUN SERPL-MCNC: 18 MG/DL (ref 5–25)
CALCIUM SERPL-MCNC: 10.4 MG/DL (ref 8.4–10.2)
CARDIAC TROPONIN I PNL SERPL HS: 13 NG/L
CHLORIDE SERPL-SCNC: 103 MMOL/L (ref 96–108)
CO2 SERPL-SCNC: 27 MMOL/L (ref 21–32)
CREAT SERPL-MCNC: 0.98 MG/DL (ref 0.6–1.3)
EOSINOPHIL # BLD AUTO: 0.25 THOUSAND/ÂΜL (ref 0–0.61)
EOSINOPHIL NFR BLD AUTO: 2 % (ref 0–6)
ERYTHROCYTE [DISTWIDTH] IN BLOOD BY AUTOMATED COUNT: 14.6 % (ref 11.6–15.1)
GFR SERPL CREATININE-BSD FRML MDRD: 54 ML/MIN/1.73SQ M
GLUCOSE SERPL-MCNC: 144 MG/DL (ref 65–140)
HCT VFR BLD AUTO: 43.7 % (ref 34.8–46.1)
HGB BLD-MCNC: 13.7 G/DL (ref 11.5–15.4)
IMM GRANULOCYTES # BLD AUTO: 0.04 THOUSAND/UL (ref 0–0.2)
IMM GRANULOCYTES NFR BLD AUTO: 0 % (ref 0–2)
INR PPP: 1.67 (ref 0.84–1.19)
LYMPHOCYTES # BLD AUTO: 1.65 THOUSANDS/ÂΜL (ref 0.6–4.47)
LYMPHOCYTES NFR BLD AUTO: 16 % (ref 14–44)
MCH RBC QN AUTO: 28.6 PG (ref 26.8–34.3)
MCHC RBC AUTO-ENTMCNC: 31.4 G/DL (ref 31.4–37.4)
MCV RBC AUTO: 91 FL (ref 82–98)
MONOCYTES # BLD AUTO: 0.71 THOUSAND/ÂΜL (ref 0.17–1.22)
MONOCYTES NFR BLD AUTO: 7 % (ref 4–12)
NEUTROPHILS # BLD AUTO: 7.64 THOUSANDS/ÂΜL (ref 1.85–7.62)
NEUTS SEG NFR BLD AUTO: 74 % (ref 43–75)
NRBC BLD AUTO-RTO: 0 /100 WBCS
PLATELET # BLD AUTO: 232 THOUSANDS/UL (ref 149–390)
PMV BLD AUTO: 10.6 FL (ref 8.9–12.7)
POTASSIUM SERPL-SCNC: 4.2 MMOL/L (ref 3.5–5.3)
PROT SERPL-MCNC: 7.8 G/DL (ref 6.4–8.4)
PROTHROMBIN TIME: 20.5 SECONDS (ref 11.6–14.5)
RBC # BLD AUTO: 4.79 MILLION/UL (ref 3.81–5.12)
SODIUM SERPL-SCNC: 138 MMOL/L (ref 135–147)
WBC # BLD AUTO: 10.38 THOUSAND/UL (ref 4.31–10.16)

## 2023-11-13 PROCEDURE — 85025 COMPLETE CBC W/AUTO DIFF WBC: CPT | Performed by: EMERGENCY MEDICINE

## 2023-11-13 PROCEDURE — 36415 COLL VENOUS BLD VENIPUNCTURE: CPT

## 2023-11-13 PROCEDURE — 99285 EMERGENCY DEPT VISIT HI MDM: CPT

## 2023-11-13 PROCEDURE — 83880 ASSAY OF NATRIURETIC PEPTIDE: CPT | Performed by: EMERGENCY MEDICINE

## 2023-11-13 PROCEDURE — 99284 EMERGENCY DEPT VISIT MOD MDM: CPT | Performed by: EMERGENCY MEDICINE

## 2023-11-13 PROCEDURE — 80053 COMPREHEN METABOLIC PANEL: CPT | Performed by: EMERGENCY MEDICINE

## 2023-11-13 PROCEDURE — 71045 X-RAY EXAM CHEST 1 VIEW: CPT

## 2023-11-13 PROCEDURE — 84484 ASSAY OF TROPONIN QUANT: CPT | Performed by: EMERGENCY MEDICINE

## 2023-11-13 PROCEDURE — NC001 PR NO CHARGE: Performed by: EMERGENCY MEDICINE

## 2023-11-13 PROCEDURE — 96374 THER/PROPH/DIAG INJ IV PUSH: CPT

## 2023-11-13 PROCEDURE — 93005 ELECTROCARDIOGRAM TRACING: CPT

## 2023-11-13 PROCEDURE — 85610 PROTHROMBIN TIME: CPT | Performed by: EMERGENCY MEDICINE

## 2023-11-13 PROCEDURE — 85730 THROMBOPLASTIN TIME PARTIAL: CPT | Performed by: EMERGENCY MEDICINE

## 2023-11-13 RX ORDER — FUROSEMIDE 10 MG/ML
40 INJECTION INTRAMUSCULAR; INTRAVENOUS ONCE
Status: COMPLETED | OUTPATIENT
Start: 2023-11-13 | End: 2023-11-13

## 2023-11-13 RX ADMIN — FUROSEMIDE 40 MG: 10 INJECTION, SOLUTION INTRAMUSCULAR; INTRAVENOUS at 13:16

## 2023-11-13 NOTE — PROCEDURES
POC Cardiac US    Date/Time: 11/13/2023 5:43 PM    Performed by: Floyd Vee DO  Authorized by: Floyd Vee DO    Patient location:  ED  Other Assisting Provider: No    Procedure details:     Exam Type:  Educational    Indications: dyspnea      Assessment / Evaluation for: cardiac function and pericardial effusion      Exam Type: initial exam      Image quality: non-diagnostic      Image availability:  Images available in PACS  Cardiac findings:     Echo technique: limited 2D      Views obtained: parasternal long axis, parasternal short axis, subcostal and apical      Pericardial effusion: absent      Tamponade physiology: absent      Wall motion: normal      LV systolic function: normal      RV dilation: none

## 2023-11-13 NOTE — ED PROVIDER NOTES
History  Chief Complaint   Patient presents with    Leg Swelling     Pt has bilateral leg swelling w cardiac hx. Also reports double vision. Pt also reports hx of CHF     Pt is an 83 y/o F w PMH of CHF, a fib on coumadin presents for eval of b/l symmetric lower ext swelling for several days. No orthopnea. No CP. No abd pain. No n/v. No abd pain or distention. Slight dyspnea on exertion. History provided by:  Patient      Prior to Admission Medications   Prescriptions Last Dose Informant Patient Reported? Taking?   atorvastatin (LIPITOR) 40 mg tablet   Yes No   Sig: Take 40 mg by mouth daily   furosemide (LASIX) 20 mg tablet   Yes No   Sig: Take 20 mg by mouth daily. levothyroxine 50 mcg tablet   Yes No   Sig: Take 50 mcg by mouth daily. lisinopril (ZESTRIL) 20 mg tablet   Yes No   Sig: Take 20 mg by mouth daily. metoprolol tartrate (LOPRESSOR) 50 mg tablet   Yes No   Sig: Take 50 mg by mouth every 12 (twelve) hours   perphenazine-amitriptyline 2-10 MG TABS   Yes No   Sig: Take 2 tablets by mouth daily. potassium chloride (K-DUR) 10 mEq tablet   Yes No   Sig: Take 10 mEq by mouth daily. warfarin (COUMADIN) 4 mg tablet   Yes No   Sig: Take 4 mg by mouth daily.  Monday-friday      Facility-Administered Medications: None       Past Medical History:   Diagnosis Date    Anxiety     Asthma     Atrial fibrillation (HCC)     Cardiac disease     CHF (congestive heart failure) ([address] )     Depression 5/22/2016    HLD (hyperlipidemia) 5/22/2016    HTN (hypertension), benign 5/22/2016    Hyperlipidemia     Hypertension     Hypothyroidism 5/22/2016    Stroke [facility])        Past Surgical History:   Procedure Laterality Date    BREAST EXCISIONAL BIOPSY Right 2001    benign    TONSILLECTOMY      TOTAL ABDOMINAL HYSTERECTOMY Bilateral 1994    age 48    TOTAL ABDOMINAL HYSTERECTOMY W/ BILATERAL SALPINGOOPHORECTOMY Bilateral 1994    age 48       Family History   Problem Relation Age of Onset    No Known Problems Mother     No Known Problems Father     No Known Problems Sister     Stomach cancer Maternal Grandmother 71    No Known Problems Maternal Grandfather     No Known Problems Paternal Grandmother     No Known Problems Paternal Grandfather      I have reviewed and agree with the history as documented. E-Cigarette/Vaping     E-Cigarette/Vaping Substances     Social History     Tobacco Use    Smoking status: Former     Types: Cigarettes     Quit date:      Years since quittin.8    Smokeless tobacco: Never   Substance Use Topics    Alcohol use: No    Drug use: No       Review of Systems   Constitutional:  Negative for activity change, chills and fever. HENT:  Negative for congestion, drooling, ear pain and sore throat. Eyes:  Negative for pain and visual disturbance. Respiratory:  Negative for cough and shortness of breath. Cardiovascular:  Negative for chest pain and palpitations. Gastrointestinal:  Negative for abdominal pain, constipation and vomiting. Endocrine: Negative for cold intolerance, heat intolerance and polydipsia. Genitourinary:  Negative for dysuria and hematuria. Musculoskeletal:  Negative for arthralgias and back pain. Skin:  Negative for color change and rash. Allergic/Immunologic: Negative for environmental allergies and food allergies. Neurological:  Negative for seizures and syncope. Hematological:  Negative for adenopathy. Psychiatric/Behavioral:  Negative for agitation, behavioral problems, confusion and hallucinations. All other systems reviewed and are negative. Physical Exam  Physical Exam  Vitals and nursing note reviewed. Constitutional:       General: She is not in acute distress. Appearance: She is well-developed. HENT:      Head: Normocephalic and atraumatic. Nose: Nose normal.      Mouth/Throat:      Mouth: Mucous membranes are moist.   Eyes:      Extraocular Movements: Extraocular movements intact.       Conjunctiva/sclera: Conjunctivae normal. Pupils: Pupils are equal, round, and reactive to light. Cardiovascular:      Rate and Rhythm: Normal rate and regular rhythm. Heart sounds: No murmur heard. Pulmonary:      Effort: Pulmonary effort is normal. No respiratory distress. Breath sounds: Normal breath sounds. Abdominal:      Palpations: Abdomen is soft. Tenderness: There is no abdominal tenderness. Musculoskeletal:         General: No swelling. Cervical back: Neck supple. No rigidity or tenderness. Comments: 1+ symmetric peripheral edema    Skin:     General: Skin is warm and dry. Capillary Refill: Capillary refill takes less than 2 seconds. Neurological:      General: No focal deficit present. Mental Status: She is alert. Cranial Nerves: No cranial nerve deficit. Sensory: No sensory deficit. Motor: No weakness.    Psychiatric:         Mood and Affect: Mood normal.         Vital Signs  ED Triage Vitals [11/13/23 1205]   Temperature Pulse Respirations Blood Pressure SpO2   98.2 °F (36.8 °C) 83 18 (!) 174/122 94 %      Temp Source Heart Rate Source Patient Position - Orthostatic VS BP Location FiO2 (%)   Oral Monitor -- Right arm --      Pain Score       --           Vitals:    11/13/23 1205 11/13/23 1315   BP: (!) 174/122 139/89   Pulse: 83 69         Visual Acuity      ED Medications  Medications   furosemide (LASIX) injection 40 mg (40 mg Intravenous Given 11/13/23 1316)       Diagnostic Studies  Results Reviewed       Procedure Component Value Units Date/Time    Protime-INR [[id number]]  (Abnormal) Collected: 11/13/23 1216    Lab Status: Final result Specimen: Blood from Arm, Right Updated: 11/13/23 1253     Protime 20.5 seconds      INR 1.67    APTT [[id number]]  (Normal) Collected: 11/13/23 1216    Lab Status: Final result Specimen: Blood from Arm, Right Updated: 11/13/23 1253     PTT 35 seconds     HS Troponin I 2hr [[id number]]     Lab Status: No result Specimen: Blood     HS Troponin 0hr (reflex protocol) [[id number]]  (Normal) Collected: 11/13/23 1216    Lab Status: Final result Specimen: Blood from Arm, Right Updated: 11/13/23 1250     hs TnI 0hr 13 ng/L     B-Type Natriuretic Peptide(BNP) [[id number]]  (Abnormal) Collected: 11/13/23 1216    Lab Status: Final result Specimen: Blood from Arm, Right Updated: 11/13/23 1248      pg/mL     Comprehensive metabolic panel [[id number]]  (Abnormal) Collected: 11/13/23 1216    Lab Status: Final result Specimen: Blood from Arm, Right Updated: 11/13/23 1247     Sodium 138 mmol/L      Potassium 4.2 mmol/L      Chloride 103 mmol/L      CO2 27 mmol/L      ANION GAP 8 mmol/L      BUN 18 mg/dL      Creatinine 0.98 mg/dL      Glucose 144 mg/dL      Calcium 10.4 mg/dL      AST 21 U/L      ALT 15 U/L      Alkaline Phosphatase 81 U/L      Total Protein 7.8 g/dL      Albumin 4.6 g/dL      Total Bilirubin 1.06 mg/dL      eGFR 54 ml/min/1.73sq m     Narrative:      [place] guidelines for Chronic Kidney Disease (CKD):     Stage 1 with normal or high GFR (GFR > 90 mL/min/1.73 square meters)    Stage 2 Mild CKD (GFR = 60-89 mL/min/1.73 square meters)    Stage 3A Moderate CKD (GFR = 45-59 mL/min/1.73 square meters)    Stage 3B Moderate CKD (GFR = 30-44 mL/min/1.73 square meters)    Stage 4 Severe CKD (GFR = 15-29 mL/min/1.73 square meters)    Stage 5 End Stage CKD (GFR <15 mL/min/1.73 square meters)  Note: GFR calculation is accurate only with a steady state creatinine    CBC and differential [[id number]]  (Abnormal) Collected: 11/13/23 1216    Lab Status: Final result Specimen: Blood from Arm, Right Updated: 11/13/23 1227     WBC 10.38 Thousand/uL      RBC 4.79 Million/uL      Hemoglobin 13.7 g/dL      Hematocrit 43.7 %      MCV 91 fL      MCH 28.6 pg      MCHC 31.4 g/dL      RDW 14.6 %      MPV 10.6 fL      Platelets 112 Thousands/uL      nRBC 0 /100 WBCs      Neutrophils Relative 74 %      Immat GRANS % 0 %      Lymphocytes Relative 16 % Monocytes Relative 7 %      Eosinophils Relative 2 %      Basophils Relative 1 %      Neutrophils Absolute 7.64 Thousands/µL      Immature Grans Absolute 0.04 Thousand/uL      Lymphocytes Absolute 1.65 Thousands/µL      Monocytes Absolute 0.71 Thousand/µL      Eosinophils Absolute 0.25 Thousand/µL      Basophils Absolute 0.09 Thousands/µL                    XR chest 1 view portable    (Results Pending)              Procedures  Procedures         ED Course       83 y/o F w hx of CHF on 20 mg of lasix once a day at home presents for eval of symmetric peripheral edema. CXR is clear. Trop normal. ECG at baseline. Plan is to give patient IV Lasix in the ER. We will increase her home dose of Lasix to 20 mg twice a day instead of once a day. Extensive turn precautions provided. Patient agreed with the plan. Verbalized understanding. Medical Decision Making  83 y/o F w hx of CHF on 20 mg of lasix once a day at home presents for eval of symmetric peripheral edema. CXR is clear. Trop normal. ECG at baseline. Plan is to give patient IV Lasix in the ER. We will increase her home dose of Lasix to 20 mg twice a day instead of once a day. Extensive turn precautions provided. Patient agreed with the plan. Verbalized understanding. Amount and/or Complexity of Data Reviewed  Labs: ordered. Radiology: ordered. Risk  Prescription drug management. Disposition  Final diagnoses:   Peripheral edema     Time reflects when diagnosis was documented in both MDM as applicable and the Disposition within this note       Time User Action Codes Description Comment    11/13/2023  1:27 PM Britany Harrell Add [R60.9] Peripheral edema           ED Disposition       ED Disposition   Discharge    Condition   Stable    Date/Time   Mon Nov 13, 2023  1:27 PM    [address] discharge to home/self care.                    Follow-up Information       Follow up With Specialties Details Why Kriss Nageotte, MD Internal Medicine Schedule an appointment as soon as possible for a visit in 1 day  [address] [place] [address]  [phone]              Patient's Medications   Discharge Prescriptions    No medications on file       No discharge procedures on file.     PDMP Review       None            ED Provider  Electronically Signed by             Lars Daigle DO  11/13/23 2845

## 2023-11-13 NOTE — DISCHARGE INSTRUCTIONS
Please increase your dose of lasix to 20 mg TWICE a day and make an appointment with your family physician as soon as possible. Return if you develop chest pain, shortness of breath, nausea, nausea or vomiting.

## 2023-11-14 LAB
ATRIAL RATE: 340 BPM
QRS AXIS: 264 DEGREES
QRSD INTERVAL: 144 MS
QT INTERVAL: 420 MS
QTC INTERVAL: 462 MS
T WAVE AXIS: 92 DEGREES
VENTRICULAR RATE: 73 BPM

## 2023-11-14 PROCEDURE — 93010 ELECTROCARDIOGRAM REPORT: CPT | Performed by: INTERNAL MEDICINE

## 2024-07-03 ENCOUNTER — APPOINTMENT (EMERGENCY)
Dept: CT IMAGING | Facility: HOSPITAL | Age: 84
DRG: 291 | End: 2024-07-03
Payer: MEDICARE

## 2024-07-03 ENCOUNTER — HOSPITAL ENCOUNTER (INPATIENT)
Facility: HOSPITAL | Age: 84
LOS: 6 days | Discharge: NURSING, REHAB OR LONG-TERM CARE | DRG: 291 | End: 2024-07-09
Attending: EMERGENCY MEDICINE | Admitting: HOSPITALIST
Payer: MEDICARE

## 2024-07-03 ENCOUNTER — APPOINTMENT (EMERGENCY)
Dept: RADIOLOGY | Facility: HOSPITAL | Age: 84
DRG: 291 | End: 2024-07-03
Payer: MEDICARE

## 2024-07-03 DIAGNOSIS — R41.82 ALTERED MENTAL STATUS: ICD-10-CM

## 2024-07-03 DIAGNOSIS — F32.A DEPRESSION: ICD-10-CM

## 2024-07-03 DIAGNOSIS — R41.89 COGNITIVE IMPAIRMENT: ICD-10-CM

## 2024-07-03 DIAGNOSIS — B37.2 YEAST DERMATITIS: ICD-10-CM
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
## 2024-07-03 DIAGNOSIS — I50.9 ACUTE EXACERBATION OF CHF (CONGESTIVE HEART FAILURE) (HCC): Primary | ICD-10-CM

## 2024-07-03 DIAGNOSIS — R53.1 GENERALIZED WEAKNESS: ICD-10-CM

## 2024-07-03 PROBLEM — R17 TOTAL BILIRUBIN, ELEVATED: Status: ACTIVE | Noted: 2024-07-03

## 2024-07-03 PROBLEM — E83.52 HYPERCALCEMIA: Status: ACTIVE | Noted: 2024-07-03

## 2024-07-03 LAB
ALBUMIN SERPL BCG-MCNC: 4.1 G/DL (ref 3.5–5)
ALP SERPL-CCNC: 69 U/L (ref 34–104)
ALT SERPL W P-5'-P-CCNC: 14 U/L (ref 7–52)
ANION GAP SERPL CALCULATED.3IONS-SCNC: 11 MMOL/L (ref 4–13)
APTT PPP: 34 SECONDS (ref 23–37)
AST SERPL W P-5'-P-CCNC: 19 U/L (ref 13–39)
BACTERIA UR QL AUTO: ABNORMAL /HPF
BASOPHILS # BLD AUTO: 0.06 THOUSANDS/ÂΜL (ref 0–0.1)
BASOPHILS NFR BLD AUTO: 1 % (ref 0–1)
BILIRUB SERPL-MCNC: 1.49 MG/DL (ref 0.2–1)
BILIRUB UR QL STRIP: NEGATIVE
BNP SERPL-MCNC: 779 PG/ML (ref 0–100)
BUN SERPL-MCNC: 18 MG/DL (ref 5–25)
CA-I BLD-SCNC: 1.21 MMOL/L (ref 1.12–1.32)
CALCIUM SERPL-MCNC: 10.4 MG/DL (ref 8.4–10.2)
CHLORIDE SERPL-SCNC: 101 MMOL/L (ref 96–108)
CK SERPL-CCNC: 62 U/L (ref 26–192)
CLARITY UR: CLEAR
CO2 SERPL-SCNC: 24 MMOL/L (ref 21–32)
COLOR UR: ABNORMAL
CREAT SERPL-MCNC: 1.17 MG/DL (ref 0.6–1.3)
EOSINOPHIL # BLD AUTO: 0.17 THOUSAND/ÂΜL (ref 0–0.61)
EOSINOPHIL NFR BLD AUTO: 2 % (ref 0–6)
ERYTHROCYTE [DISTWIDTH] IN BLOOD BY AUTOMATED COUNT: 15.5 % (ref 11.6–15.1)
GFR SERPL CREATININE-BSD FRML MDRD: 43 ML/MIN/1.73SQ M
GLUCOSE SERPL-MCNC: 120 MG/DL (ref 65–140)
GLUCOSE UR STRIP-MCNC: NEGATIVE MG/DL
HCT VFR BLD AUTO: 47.5 % (ref 34.8–46.1)
HGB BLD-MCNC: 15 G/DL (ref 11.5–15.4)
HGB UR QL STRIP.AUTO: NEGATIVE
IMM GRANULOCYTES # BLD AUTO: 0.03 THOUSAND/UL (ref 0–0.2)
IMM GRANULOCYTES NFR BLD AUTO: 0 % (ref 0–2)
INR PPP: 1.66 (ref 0.84–1.19)
KETONES UR STRIP-MCNC: NEGATIVE MG/DL
LEUKOCYTE ESTERASE UR QL STRIP: ABNORMAL
LYMPHOCYTES # BLD AUTO: 0.74 THOUSANDS/ÂΜL (ref 0.6–4.47)
LYMPHOCYTES NFR BLD AUTO: 9 % (ref 14–44)
MCH RBC QN AUTO: 29.1 PG (ref 26.8–34.3)
MCHC RBC AUTO-ENTMCNC: 31.6 G/DL (ref 31.4–37.4)
MCV RBC AUTO: 92 FL (ref 82–98)
MONOCYTES # BLD AUTO: 0.62 THOUSAND/ÂΜL (ref 0.17–1.22)
MONOCYTES NFR BLD AUTO: 7 % (ref 4–12)
NEUTROPHILS # BLD AUTO: 6.9 THOUSANDS/ÂΜL (ref 1.85–7.62)
NEUTS SEG NFR BLD AUTO: 81 % (ref 43–75)
NITRITE UR QL STRIP: NEGATIVE
NON-SQ EPI CELLS URNS QL MICRO: ABNORMAL /HPF
NRBC BLD AUTO-RTO: 0 /100 WBCS
PH UR STRIP.AUTO: 5.5 [PH]
PLATELET # BLD AUTO: 236 THOUSANDS/UL (ref 149–390)
PMV BLD AUTO: 10.8 FL (ref 8.9–12.7)
POTASSIUM SERPL-SCNC: 3.9 MMOL/L (ref 3.5–5.3)
PROT SERPL-MCNC: 6.8 G/DL (ref 6.4–8.4)
PROT UR STRIP-MCNC: ABNORMAL MG/DL
PROTHROMBIN TIME: 20.4 SECONDS (ref 11.6–14.5)
RBC # BLD AUTO: 5.16 MILLION/UL (ref 3.81–5.12)
RBC #/AREA URNS AUTO: ABNORMAL /HPF
SODIUM SERPL-SCNC: 136 MMOL/L (ref 135–147)
SP GR UR STRIP.AUTO: 1.01 (ref 1–1.03)
UROBILINOGEN UR STRIP-ACNC: <2 MG/DL
WBC # BLD AUTO: 8.52 THOUSAND/UL (ref 4.31–10.16)
WBC #/AREA URNS AUTO: ABNORMAL /HPF

## 2024-07-03 PROCEDURE — 85610 PROTHROMBIN TIME: CPT | Performed by: EMERGENCY MEDICINE

## 2024-07-03 PROCEDURE — 81001 URINALYSIS AUTO W/SCOPE: CPT | Performed by: EMERGENCY MEDICINE

## 2024-07-03 PROCEDURE — 80053 COMPREHEN METABOLIC PANEL: CPT | Performed by: EMERGENCY MEDICINE

## 2024-07-03 PROCEDURE — 94640 AIRWAY INHALATION TREATMENT: CPT

## 2024-07-03 PROCEDURE — 71045 X-RAY EXAM CHEST 1 VIEW: CPT

## 2024-07-03 PROCEDURE — 99223 1ST HOSP IP/OBS HIGH 75: CPT | Performed by: HOSPITALIST

## 2024-07-03 PROCEDURE — 99285 EMERGENCY DEPT VISIT HI MDM: CPT

## 2024-07-03 PROCEDURE — 82550 ASSAY OF CK (CPK): CPT | Performed by: EMERGENCY MEDICINE

## 2024-07-03 PROCEDURE — 85025 COMPLETE CBC W/AUTO DIFF WBC: CPT | Performed by: EMERGENCY MEDICINE

## 2024-07-03 PROCEDURE — 36415 COLL VENOUS BLD VENIPUNCTURE: CPT | Performed by: EMERGENCY MEDICINE

## 2024-07-03 PROCEDURE — 82330 ASSAY OF CALCIUM: CPT

## 2024-07-03 PROCEDURE — 83880 ASSAY OF NATRIURETIC PEPTIDE: CPT | Performed by: EMERGENCY MEDICINE

## 2024-07-03 PROCEDURE — 70450 CT HEAD/BRAIN W/O DYE: CPT

## 2024-07-03 PROCEDURE — 85730 THROMBOPLASTIN TIME PARTIAL: CPT | Performed by: EMERGENCY MEDICINE

## 2024-07-03 PROCEDURE — 99285 EMERGENCY DEPT VISIT HI MDM: CPT | Performed by: EMERGENCY MEDICINE

## 2024-07-03 PROCEDURE — 96365 THER/PROPH/DIAG IV INF INIT: CPT

## 2024-07-03 PROCEDURE — 93005 ELECTROCARDIOGRAM TRACING: CPT

## 2024-07-03 PROCEDURE — 96375 TX/PRO/DX INJ NEW DRUG ADDON: CPT

## 2024-07-03 RX ORDER — METOPROLOL TARTRATE 50 MG/1
50 TABLET, FILM COATED ORAL EVERY 12 HOURS SCHEDULED
Status: DISCONTINUED | OUTPATIENT
Start: 2024-07-03 | End: 2024-07-04

## 2024-07-03 RX ORDER — ACETAMINOPHEN 325 MG/1
650 TABLET ORAL EVERY 6 HOURS PRN
Status: DISCONTINUED | OUTPATIENT
Start: 2024-07-03 | End: 2024-07-09 | Stop reason: HOSPADM

## 2024-07-03 RX ORDER — WARFARIN SODIUM 4 MG/1
4 TABLET ORAL
Status: DISCONTINUED | OUTPATIENT
Start: 2024-07-03 | End: 2024-07-09 | Stop reason: HOSPADM

## 2024-07-03 RX ORDER — LEVOTHYROXINE SODIUM 0.05 MG/1
50 TABLET ORAL
Status: DISCONTINUED | OUTPATIENT
Start: 2024-07-04 | End: 2024-07-09 | Stop reason: HOSPADM

## 2024-07-03 RX ORDER — LISINOPRIL 20 MG/1
20 TABLET ORAL DAILY
Status: DISCONTINUED | OUTPATIENT
Start: 2024-07-04 | End: 2024-07-09

## 2024-07-03 RX ORDER — PERPHENAZINE 4 MG/1
4 TABLET ORAL DAILY
Status: DISCONTINUED | OUTPATIENT
Start: 2024-07-04 | End: 2024-07-08

## 2024-07-03 RX ORDER — CALCIUM CARBONATE 500 MG/1
1000 TABLET, CHEWABLE ORAL DAILY PRN
Status: DISCONTINUED | OUTPATIENT
Start: 2024-07-03 | End: 2024-07-09 | Stop reason: HOSPADM

## 2024-07-03 RX ORDER — IPRATROPIUM BROMIDE AND ALBUTEROL SULFATE 2.5; .5 MG/3ML; MG/3ML
3 SOLUTION RESPIRATORY (INHALATION) ONCE
Status: COMPLETED | OUTPATIENT
Start: 2024-07-03 | End: 2024-07-03

## 2024-07-03 RX ORDER — FUROSEMIDE 10 MG/ML
40 INJECTION INTRAMUSCULAR; INTRAVENOUS ONCE
Status: COMPLETED | OUTPATIENT
Start: 2024-07-03 | End: 2024-07-03

## 2024-07-03 RX ORDER — ONDANSETRON 2 MG/ML
4 INJECTION INTRAMUSCULAR; INTRAVENOUS EVERY 6 HOURS PRN
Status: DISCONTINUED | OUTPATIENT
Start: 2024-07-03 | End: 2024-07-09 | Stop reason: HOSPADM

## 2024-07-03 RX ORDER — ATORVASTATIN CALCIUM 40 MG/1
40 TABLET, FILM COATED ORAL EVERY EVENING
Status: DISCONTINUED | OUTPATIENT
Start: 2024-07-04 | End: 2024-07-09 | Stop reason: HOSPADM

## 2024-07-03 RX ORDER — FUROSEMIDE 10 MG/ML
40 INJECTION INTRAMUSCULAR; INTRAVENOUS 2 TIMES DAILY
Status: DISCONTINUED | OUTPATIENT
Start: 2024-07-04 | End: 2024-07-04

## 2024-07-03 RX ORDER — POTASSIUM CHLORIDE 750 MG/1
10 TABLET, FILM COATED, EXTENDED RELEASE ORAL DAILY
Status: DISCONTINUED | OUTPATIENT
Start: 2024-07-04 | End: 2024-07-03 | Stop reason: SDUPTHER

## 2024-07-03 RX ORDER — POTASSIUM CHLORIDE 750 MG/1
10 TABLET, EXTENDED RELEASE ORAL DAILY
Status: DISCONTINUED | OUTPATIENT
Start: 2024-07-04 | End: 2024-07-09 | Stop reason: HOSPADM

## 2024-07-03 RX ORDER — PERPHENAZINE AND AMITRIPTYLINE HYDROCHLORIDE 2; 10 MG/1; MG/1
2 TABLET, FILM COATED ORAL DAILY
Status: DISCONTINUED | OUTPATIENT
Start: 2024-07-04 | End: 2024-07-03 | Stop reason: SDUPTHER

## 2024-07-03 RX ORDER — NYSTATIN 100000 [USP'U]/G
POWDER TOPICAL 2 TIMES DAILY
Status: DISCONTINUED | OUTPATIENT
Start: 2024-07-03 | End: 2024-07-09 | Stop reason: HOSPADM

## 2024-07-03 RX ORDER — AMITRIPTYLINE HYDROCHLORIDE 10 MG/1
20 TABLET, FILM COATED ORAL DAILY
Status: DISCONTINUED | OUTPATIENT
Start: 2024-07-04 | End: 2024-07-08

## 2024-07-03 RX ADMIN — IPRATROPIUM BROMIDE AND ALBUTEROL SULFATE 3 ML: 2.5; .5 SOLUTION RESPIRATORY (INHALATION) at 15:46

## 2024-07-03 RX ADMIN — SODIUM CHLORIDE, SODIUM LACTATE, POTASSIUM CHLORIDE, AND CALCIUM CHLORIDE 1000 ML: .6; .31; .03; .02 INJECTION, SOLUTION INTRAVENOUS at 14:34

## 2024-07-03 RX ADMIN — METOPROLOL TARTRATE 50 MG: 50 TABLET, FILM COATED ORAL at 20:54

## 2024-07-03 RX ADMIN — NYSTATIN: 100000 POWDER TOPICAL at 18:17

## 2024-07-03 RX ADMIN — FUROSEMIDE 40 MG: 10 INJECTION, SOLUTION INTRAMUSCULAR; INTRAVENOUS at 17:20

## 2024-07-03 RX ADMIN — WARFARIN SODIUM 4 MG: 4 TABLET ORAL at 20:54

## 2024-07-03 NOTE — ED PROVIDER NOTES
"Pt Name: Nola Welch  MRN: [id number]  Birthdate 1940  Age/Sex: 84 y.o. female  Date of evaluation: 7/3/2024  PCP: Kelsey King MD    CHIEF COMPLAINT    Chief Complaint   Patient presents with    Fall     Pt presents after losing her balance while sorting garbage in her garage. States she \"let herself down\" and did not fall. -LOC, -HS, +coumadin         HPI    84 y.o. female presenting with generalized weakness and confusion.  Patient states that she lives alone and does not have any help at the house, states that she has been doing worse over the past week.  She states that she has been having significant difficulties with memory, attempted to drive to a wedding a few days ago but was unable to find her way and eventually returned home after driving for a long period of time and being unable to navigate to the wedding.  She also states she has been forgetting things frequently, and also feels generally weak.  Today, she states that she was trying to clean her garage, had used resolved on the ground and was sitting on the floor but was unable to get up.  She also complains of some pain in the suprapubic area which is dull, mild to moderate, worse with pressing the area moving and better at rest.  She denies focal weakness or numbness, trauma, headache, fever, nausea, vomiting, diarrhea, other symptoms.  Patient states that at this time she feels she is not safe to care for herself at home, also notes that she is hungry, states she has not eaten anything since yesterday and was not able to get food today.      HPI      Past Medical and Surgical History    Past Medical History:   Diagnosis Date    Anxiety     Asthma     Atrial fibrillation (HCC)     Cardiac disease     CHF (congestive heart failure) (HCC)     Depression 5/22/2016    HLD (hyperlipidemia) 5/22/2016    HTN (hypertension), benign 5/22/2016    Hyperlipidemia     Hypertension     Hypothyroidism 5/22/2016    Stroke (HCC)        Past Surgical " History:   Procedure Laterality Date    BREAST EXCISIONAL BIOPSY Right 2001    benign    TONSILLECTOMY      TOTAL ABDOMINAL HYSTERECTOMY Bilateral     age 54    TOTAL ABDOMINAL HYSTERECTOMY W/ BILATERAL SALPINGOOPHORECTOMY Bilateral     age 54       Family History   Problem Relation Age of Onset    No Known Problems Mother     No Known Problems Father     No Known Problems Sister     Stomach cancer Maternal Grandmother 69    No Known Problems Maternal Grandfather     No Known Problems Paternal Grandmother     No Known Problems Paternal Grandfather        Social History     Tobacco Use    Smoking status: Former     Current packs/day: 0.00     Types: Cigarettes     Quit date:      Years since quittin.5    Smokeless tobacco: Never   Substance Use Topics    Alcohol use: No    Drug use: No           Allergies    Allergies   Allergen Reactions    Penicillins Dermatitis       Home Medications    Prior to Admission medications    Medication Sig Start Date End Date Taking? Authorizing Provider   atorvastatin (LIPITOR) 40 mg tablet Take 40 mg by mouth daily    Historical Provider, MD   furosemide (LASIX) 20 mg tablet Take 20 mg by mouth daily.    Historical Provider, MD   levothyroxine 50 mcg tablet Take 50 mcg by mouth daily.    Historical Provider, MD   lisinopril (ZESTRIL) 20 mg tablet Take 20 mg by mouth daily.    Historical Provider, MD   metoprolol tartrate (LOPRESSOR) 50 mg tablet Take 50 mg by mouth every 12 (twelve) hours    Historical Provider, MD   perphenazine-amitriptyline 2-10 MG TABS Take 2 tablets by mouth daily.    Historical Provider, MD   potassium chloride (K-DUR) 10 mEq tablet Take 10 mEq by mouth daily.    Historical Provider, MD   warfarin (COUMADIN) 4 mg tablet Take 4 mg by mouth daily. Monday-friday    Historical Provider, MD           Review of Systems    Review of Systems   Constitutional:  Positive for fatigue. Negative for activity change, chills and fever.   HENT:  Negative for  drooling and facial swelling.    Eyes:  Negative for pain, discharge and visual disturbance.   Respiratory:  Negative for apnea, cough, chest tightness, shortness of breath and wheezing.    Cardiovascular:  Negative for chest pain and leg swelling.   Gastrointestinal:  Positive for abdominal pain. Negative for constipation, diarrhea, nausea and vomiting.   Genitourinary:  Negative for difficulty urinating, dysuria and urgency.   Musculoskeletal:  Negative for arthralgias, back pain and gait problem.   Skin:  Negative for color change and rash.   Neurological:  Negative for dizziness, speech difficulty, weakness and headaches.   Psychiatric/Behavioral:  Positive for confusion. Negative for agitation and behavioral problems.            All other systems reviewed and negative.    Physical Exam      ED Triage Vitals [07/03/24 1401]   Temperature Pulse Respirations Blood Pressure SpO2   98.7 °F (37.1 °C) 81 20 170/99 92 %      Temp Source Heart Rate Source Patient Position - Orthostatic VS BP Location FiO2 (%)   Oral Monitor Sitting Left arm --      Pain Score       No Pain               Physical Exam  Vitals and nursing note reviewed.   Constitutional:       General: She is not in acute distress.     Appearance: She is well-developed. She is not ill-appearing, toxic-appearing or diaphoretic.   HENT:      Head: Normocephalic and atraumatic.      Right Ear: External ear normal.      Left Ear: External ear normal.      Nose: Nose normal. No congestion or rhinorrhea.      Mouth/Throat:      Mouth: Mucous membranes are dry.      Pharynx: Oropharynx is clear. No oropharyngeal exudate.   Eyes:      Conjunctiva/sclera: Conjunctivae normal.      Pupils: Pupils are equal, round, and reactive to light.   Cardiovascular:      Rate and Rhythm: Normal rate and regular rhythm.      Pulses: Normal pulses.      Heart sounds: Normal heart sounds. No murmur heard.     No friction rub. No gallop.   Pulmonary:      Effort: Pulmonary effort  is normal. No respiratory distress.      Breath sounds: Normal breath sounds. No wheezing or rales.   Abdominal:      General: There is no distension.      Palpations: Abdomen is soft.      Tenderness: There is no abdominal tenderness. There is no guarding or rebound.   Genitourinary:     Comments:   Erythematous rash with satellite lesions noted in the left inguinal crease consistent with yeast dermatitis.  Musculoskeletal:         General: No deformity. Normal range of motion.      Cervical back: Normal range of motion and neck supple. No rigidity or tenderness.      Right lower leg: Edema present.      Left lower leg: Edema present.      Comments: 1+ pitting edema bilateral lower extremity   Skin:     General: Skin is warm and dry.      Capillary Refill: Capillary refill takes less than 2 seconds.      Findings: No erythema or rash.   Neurological:      Mental Status: She is alert and oriented to person, place, and time.      Cranial Nerves: No cranial nerve deficit.      Motor: No weakness.      Coordination: Coordination normal.   Psychiatric:         Behavior: Behavior normal.         Thought Content: Thought content normal.         Judgment: Judgment normal.              Diagnostic Results    EKG Interpretation    Rate:  76  BPM  Rhythm:   Atrial fibrillation  Axis: Left axis deviation  Intervals: Left bundle branch block QTc  488 ms  Q waves:  No pathologic Q waves   T waves:  Normal   ST segments:  No significant elevations or depressions     Impression:  A-fib with left axis deviation and left bundle branch block but without evidence of acute ischemia or significant arrhythmia      EKG for comparison: EKG dated 13 November 2023 similar in character no major    EKG interpreted by me.     Labs:    Results Reviewed       Procedure Component Value Units Date/Time    Calcium, ionized [[id number]]  (Normal) Collected: 07/03/24 1959    Lab Status: Final result Specimen: Blood from Arm, Right Updated: 07/03/24 2019      Calcium, Ionized 1.21 mmol/L     Urine Microscopic [[id number]]  (Abnormal) Collected: 07/03/24 1714    Lab Status: Final result Specimen: Urine, Clean Catch Updated: 07/03/24 1754     RBC, UA 1-2 /hpf      WBC, UA 4-10 /hpf      Epithelial Cells Occasional /hpf      Bacteria, UA None Seen /hpf     UA w Reflex to Microscopic w Reflex to Culture [[id number]]  (Abnormal) Collected: 07/03/24 1714    Lab Status: Final result Specimen: Urine, Clean Catch Updated: 07/03/24 1750     Color, UA Light Yellow     Clarity, UA Clear     Specific Gravity, UA 1.013     pH, UA 5.5     Leukocytes, UA Moderate     Nitrite, UA Negative     Protein, UA Trace mg/dl      Glucose, UA Negative mg/dl      Ketones, UA Negative mg/dl      Urobilinogen, UA <2.0 mg/dl      Bilirubin, UA Negative     Occult Blood, UA Negative    Protime-INR [[id number]]  (Abnormal) Collected: 07/03/24 1714    Lab Status: Final result Specimen: Blood from Arm, Right Updated: 07/03/24 1733     Protime 20.4 seconds      INR 1.66    APTT [[id number]]  (Normal) Collected: 07/03/24 1714    Lab Status: Final result Specimen: Blood from Arm, Right Updated: 07/03/24 1733     PTT 34 seconds     B-Type Natriuretic Peptide(BNP) [[id number]]  (Abnormal) Collected: 07/03/24 1430    Lab Status: Final result Specimen: Blood from Arm, Right Updated: 07/03/24 1530      pg/mL     Comprehensive metabolic panel [[id number]]  (Abnormal) Collected: 07/03/24 1430    Lab Status: Final result Specimen: Blood from Arm, Right Updated: 07/03/24 1509     Sodium 136 mmol/L      Potassium 3.9 mmol/L      Chloride 101 mmol/L      CO2 24 mmol/L      ANION GAP 11 mmol/L      BUN 18 mg/dL      Creatinine 1.17 mg/dL      Glucose 120 mg/dL      Calcium 10.4 mg/dL      AST 19 U/L      ALT 14 U/L      Alkaline Phosphatase 69 U/L      Total Protein 6.8 g/dL      Albumin 4.1 g/dL      Total Bilirubin 1.49 mg/dL      eGFR 43 ml/min/1.73sq m     Narrative:      [employer]  guidelines for Chronic Kidney Disease (CKD):     Stage 1 with normal or high GFR (GFR > 90 mL/min/1.73 square meters)    Stage 2 Mild CKD (GFR = 60-89 mL/min/1.73 square meters)    Stage 3A Moderate CKD (GFR = 45-59 mL/min/1.73 square meters)    Stage 3B Moderate CKD (GFR = 30-44 mL/min/1.73 square meters)    Stage 4 Severe CKD (GFR = 15-29 mL/min/1.73 square meters)    Stage 5 End Stage CKD (GFR <15 mL/min/1.73 square meters)  Note: GFR calculation is accurate only with a steady state creatinine    CK [[id number]]  (Normal) Collected: 07/03/24 1430    Lab Status: Final result Specimen: Blood from Arm, Right Updated: 07/03/24 1509     Total CK 62 U/L     CBC and differential [[id number]]  (Abnormal) Collected: 07/03/24 1430    Lab Status: Final result Specimen: Blood from Arm, Right Updated: 07/03/24 1450     WBC 8.52 Thousand/uL      RBC 5.16 Million/uL      Hemoglobin 15.0 g/dL      Hematocrit 47.5 %      MCV 92 fL      MCH 29.1 pg      MCHC 31.6 g/dL      RDW 15.5 %      MPV 10.8 fL      Platelets 236 Thousands/uL      nRBC 0 /100 WBCs      Segmented % 81 %      Immature Grans % 0 %      Lymphocytes % 9 %      Monocytes % 7 %      Eosinophils Relative 2 %      Basophils Relative 1 %      Absolute Neutrophils 6.90 Thousands/µL      Absolute Immature Grans 0.03 Thousand/uL      Absolute Lymphocytes 0.74 Thousands/µL      Absolute Monocytes 0.62 Thousand/µL      Eosinophils Absolute 0.17 Thousand/µL      Basophils Absolute 0.06 Thousands/µL             All labs reviewed and utilized in the medical decision making process    Radiology:    CT head without contrast   Final Result      No acute intracranial abnormality.                  Workstation performed: [id number]         XR chest 1 view    (Results Pending)       All radiology studies independently viewed by me and interpreted by the radiologist.    Procedure    Procedures        ED Course of Care and Re-Assessments      Given IV fluids based on some evidence of  dehydration on clinical exam.  fluid bolus stopped after initial 500 mL based on history of CHF.    Medications   nystatin (MYCOSTATIN) powder ( Topical Given 7/3/24 1817)   atorvastatin (LIPITOR) tablet 40 mg (has no administration in time range)   levothyroxine tablet 50 mcg (has no administration in time range)   lisinopril (ZESTRIL) tablet 20 mg (has no administration in time range)   metoprolol tartrate (LOPRESSOR) tablet 50 mg (50 mg Oral Given 7/3/24 2054)   warfarin (COUMADIN) tablet 4 mg (4 mg Oral Given 7/3/24 2054)   acetaminophen (TYLENOL) tablet 650 mg (has no administration in time range)   ondansetron (ZOFRAN) injection 4 mg (has no administration in time range)   calcium carbonate (TUMS) chewable tablet 1,000 mg (has no administration in time range)   furosemide (LASIX) injection 40 mg (has no administration in time range)   potassium chloride (Klor-Con M10) CR tablet 10 mEq (has no administration in time range)   perphenazine tablet 4 mg (has no administration in time range)     And   amitriptyline (ELAVIL) tablet 20 mg (has no administration in time range)   ipratropium-albuterol (DUO-NEB) 0.5-2.5 mg/3 mL inhalation solution 3 mL (3 mL Nebulization Given 7/3/24 1546)   furosemide (LASIX) injection 40 mg (40 mg Intravenous Given 7/3/24 1720)           FINAL IMPRESSION    Final diagnoses:   Fall, initial encounter   Altered mental status   Acute exacerbation of CHF (congestive heart failure) (HCC)   Generalized weakness   Yeast dermatitis         DISPOSITION/PLAN    Presentation as above with generalized weakness and altered mental status in the setting of CHF exacerbation as well as possible fall earlier today.  Vital signs and examination as above.  Yeast dermatitis incidentally noted, treated with nystatin.  Labs and imaging as above, felt most consistent with CHF exacerbation may be contributor.  No evidence of acute infection at this time.  Unclear if patient's decompensation is due to CHF or  generalized deconditioning, but at this point patient lives alone and cannot perform ADLs is having difficulty navigating her home as well as feeding herself, requires additional support.  Admitted to internal medicine, hemodynamically stable and comfortable at that time.  Time reflects when diagnosis was documented in both MDM as applicable and the Disposition within this note       Time User Action Codes Description Comment    7/3/2024  6:55 PM Grant Norris Add [W19.XXXA] Fall, initial encounter     7/3/2024  6:55 PM Grant Norris Add [R41.82] Altered mental status     7/3/2024  6:55 PM Grant Norris Add [I50.9] Acute exacerbation of CHF (congestive heart failure) (HCC)     7/3/2024  6:55 PM Grant Norris Add [R53.1] Generalized weakness     7/3/2024  6:55 PM Grant Norris Add [B37.2] Yeast dermatitis     7/3/2024  6:55 PM Grant Norris Modify [W19.XXXA] Fall, initial encounter     7/3/2024  6:55 PM Grant Norris Modify [I50.9] Acute exacerbation of CHF (congestive heart failure) (HCC)           ED Disposition       ED Disposition   Admit    Condition   Stable    Date/Time   Wed Jul 3, 2024  6:55 PM    Comment   Case was discussed with OLEGARIO and the patient's admission status was agreed to be Admission Status: inpatient status to the service of Dr. Brooks .               Follow-up Information    None           PATIENT REFERRED TO:    No follow-up provider specified.    DISCHARGE MEDICATIONS:    Current Discharge Medication List        CONTINUE these medications which have NOT CHANGED    Details   atorvastatin (LIPITOR) 40 mg tablet Take 40 mg by mouth daily      furosemide (LASIX) 20 mg tablet Take 20 mg by mouth daily.      levothyroxine 50 mcg tablet Take 50 mcg by mouth daily.      lisinopril (ZESTRIL) 20 mg tablet Take 20 mg by mouth daily.      metoprolol tartrate (LOPRESSOR) 50 mg tablet Take 50 mg by mouth every 12 (twelve) hours      perphenazine-amitriptyline  "2-10 MG TABS Take 2 tablets by mouth daily.      potassium chloride (K-DUR) 10 mEq tablet Take 10 mEq by mouth daily.      warfarin (COUMADIN) 4 mg tablet Take 4 mg by mouth daily. Monday-friday             No discharge procedures on file.         Grant Norris MD    Portions of the record may have been created with voice recognition software.  Occasional wrong word or \"sound alike\" substitutions may have occurred due to the inherent limitations of voice recognition software.  Please read the chart carefully and recognize, using context, where substitutions have occurred     Grant Norris MD  07/03/24 6112    "

## 2024-07-03 NOTE — H&P
[facility]  H&P  Name: Nola Welch 84 y.o. female I MRN: [id number]  Unit/Bed#: ED-32 I Date of Admission: 7/3/2024   Date of Service: 7/3/2024 I Hospital Day: 0      Assessment & Plan   * Acute exacerbation of CHF (congestive heart failure) (HCC)  Assessment & Plan  Wt Readings from Last 3 Encounters:   07/03/24 83 kg (183 lb)   04/02/23 83.2 kg (183 lb 6.8 oz)   08/27/21 85.3 kg (188 lb)     Lab Results   Component Value Date     (H) 07/03/2024     (H) 11/13/2023       Presents with dyspnea on exertion and lower extremity edema  Patient is currently volume overloaded.  Last echo in June 2023 showed LVEF 60 to 65% with normal systolic function. Indeterminate diastolic function due to atrial fibrillation.     Plan:  Home meds GDMT: Diuretic: Oral Lasix 20 mg daily, B-Blocker: Metoprolol daily, ACE/ARB/ARNi: Lisinopril 20 mg daily  Sodium restriction 2g  CMP, magnesium tomorrow a.m; Goal Mg > 2 and K > 4; Replete prn  HOB > 30°, Daily standing weights, Measure I/O  Consult nutrition for dietary education  IV Lasix 40 mg twice daily while inpatient.            Fall  Assessment & Plan  Patient reports that she let herself down at home this afternoon while sorting out garbage in the garage.  Denied head strike or injury.  Patient on Coumadin for A-fib.  The head was negative.  Admitted for placement issue.    Plans:  Fall precaution  Check orthostatic blood pressure  Case management consult  Monitor intake output in the setting of CHF exacerbation    Total bilirubin, elevated  Assessment & Plan  Total bilirubin 1.49 on admission in the setting of poor intake.  Monitor as needed.    Hypercalcemia  Assessment & Plan  Calcium 10.4 on admission  In the setting of poor oral intake  Will check ionized calcium  Monitor renal function    Diarrhea  Assessment & Plan  Patient reports watery diarrhea usually 3 times per day.  No bowel movement today yet.  Monitor symptoms  Consider to  "check stool test if symptomatic.    HLD (hyperlipidemia)  Assessment & Plan  Continue home med Lipitor    Hypothyroidism  Assessment & Plan  Continue home med levothyroxine    Atrial fibrillation with RVR (HCC)  Assessment & Plan  Patient reports A-fib on warfarin 4 mg Monday to Friday.      Lab Results   Component Value Date    INR 1.66 (H) 07/03/2024    INR 2.1 06/01/2024    INR 1.6 05/17/2024      Patient did not take warfarin this morning.  Continue home med warfarin and monitor INR in the morning.           VTE Pharmacologic Prophylaxis: VTE Score: 6 High Risk (Score >/= 5) - Pharmacological DVT Prophylaxis Ordered: warfarin (Coumadin). Sequential Compression Devices Ordered.  Code Status: Level 1 - Full Code   Discussion with family: Updated  (sister) via phone.    Anticipated Length of Stay: Patient will be admitted on an inpatient basis with an anticipated length of stay of greater than 2 midnights secondary to  .    Chief Complaint: Fall, generalized weakness    History of Present Illness:  Nola Welch is a 84 y.o. female with a PMH of asthma, atrial fibrillation on warfarin, HFpEF, hypertension, hyperlipidemia, diabetes, hypothyroidism who presents with generalized weakness s/p fall, generalized weakness and confusion.  Patient reports that she lives alone and she \" let herself down\" at home this afternoon.  She endorsed that she was sorting garbage in the garage and felt generalized weakness and let herself down.  She could not get up neighbors found her around 2:30 PM.  Denied head strike or injury.  However patient is on Coumadin for A-fib.  Patient reports leg swellings bilaterally the setting of a CHF exacerbation. Unable to care for self or perform adls at home, was unable to access food at home today.  She reports visiting nurse comes to her home 6 days/week. Says she has not been taking her lasix.  CT head was negative.  Patient is admitted in medical team for further management " s/p fall and placement issue.    Review of Systems:  Review of Systems   Constitutional:  Positive for fatigue. Negative for chills and fever.        Generalized weakness.   HENT:  Negative for ear pain and sore throat.    Eyes:  Negative for pain and visual disturbance.   Respiratory:  Positive for shortness of breath. Negative for cough.    Cardiovascular:  Negative for chest pain and palpitations.   Gastrointestinal:  Negative for abdominal pain, constipation, diarrhea, nausea and vomiting.   Genitourinary:  Negative for dysuria, flank pain, frequency and hematuria.   Musculoskeletal:  Negative for arthralgias, back pain and neck pain.   Skin:  Negative for color change and rash.   Neurological:  Negative for dizziness, seizures, syncope, light-headedness, numbness and headaches.   Psychiatric/Behavioral:          Mild confusion   All other systems reviewed and are negative.      Past Medical and Surgical History:   Past Medical History:   Diagnosis Date    Anxiety     Asthma     Atrial fibrillation (HCC)     Cardiac disease     CHF (congestive heart failure) (HCC)     Depression 5/22/2016    HLD (hyperlipidemia) 5/22/2016    HTN (hypertension), benign 5/22/2016    Hyperlipidemia     Hypertension     Hypothyroidism 5/22/2016    Stroke (HCC)        Past Surgical History:   Procedure Laterality Date    BREAST EXCISIONAL BIOPSY Right 2001    benign    TONSILLECTOMY      TOTAL ABDOMINAL HYSTERECTOMY Bilateral 1994    age 54    TOTAL ABDOMINAL HYSTERECTOMY W/ BILATERAL SALPINGOOPHORECTOMY Bilateral 1994    age 54       Meds/Allergies:  Prior to Admission medications    Medication Sig Start Date End Date Taking? Authorizing Provider   atorvastatin (LIPITOR) 40 mg tablet Take 40 mg by mouth daily    Historical Provider, MD   furosemide (LASIX) 20 mg tablet Take 20 mg by mouth daily.    Historical Provider, MD   levothyroxine 50 mcg tablet Take 50 mcg by mouth daily.    Historical Provider, MD   lisinopril (ZESTRIL) 20  "mg tablet Take 20 mg by mouth daily.    Historical Provider, MD   metoprolol tartrate (LOPRESSOR) 50 mg tablet Take 50 mg by mouth every 12 (twelve) hours    Historical Provider, MD   perphenazine-amitriptyline 2-10 MG TABS Take 2 tablets by mouth daily.    Historical Provider, MD   potassium chloride (K-DUR) 10 mEq tablet Take 10 mEq by mouth daily.    Historical Provider, MD   warfarin (COUMADIN) 4 mg tablet Take 4 mg by mouth daily. Monday-friday    Historical Provider, MD ANDUJAR have reviewed home medications with patient personally.    Allergies:   Allergies   Allergen Reactions    Penicillins Dermatitis       Social History:  Marital Status:    Occupation:   Patient Pre-hospital Living Situation: Home  Patient Pre-hospital Level of Mobility: walks  Patient Pre-hospital Diet Restrictions: Cardiac diet  Substance Use History:   Social History     Substance and Sexual Activity   Alcohol Use No     Social History     Tobacco Use   Smoking Status Former    Current packs/day: 0.00    Types: Cigarettes    Quit date:     Years since quittin.5   Smokeless Tobacco Never     Social History     Substance and Sexual Activity   Drug Use No       Family History:  Family History   Problem Relation Age of Onset    No Known Problems Mother     No Known Problems Father     No Known Problems Sister     Stomach cancer Maternal Grandmother 69    No Known Problems Maternal Grandfather     No Known Problems Paternal Grandmother     No Known Problems Paternal Grandfather        Physical Exam:     Vitals:   Blood Pressure: 170/99 (24 1401)  Pulse: 81 (24 140)  Temperature: 98.7 °F (37.1 °C) (24 140)  Temp Source: Oral (24 140)  Respirations: 20 (24 1401)  Height: 5' 4.5\" (163.8 cm) (24)  Weight - Scale: 83 kg (183 lb) (24)  SpO2: 92 % (24 140)    Physical Exam  Vitals and nursing note reviewed.   Constitutional:       General: She is not in acute distress.     " Appearance: She is well-developed.   HENT:      Head: Normocephalic and atraumatic.      Mouth/Throat:      Mouth: Mucous membranes are moist.      Pharynx: Oropharynx is clear.   Eyes:      Extraocular Movements: Extraocular movements intact.      Conjunctiva/sclera: Conjunctivae normal.      Pupils: Pupils are equal, round, and reactive to light.   Neck:      Comments: JVD  Cardiovascular:      Rate and Rhythm: Normal rate and regular rhythm.      Heart sounds: Normal heart sounds. No murmur heard.  Pulmonary:      Effort: Pulmonary effort is normal. No respiratory distress.      Breath sounds: Normal breath sounds. No wheezing or rales.   Abdominal:      General: Bowel sounds are normal. There is no distension.      Palpations: Abdomen is soft.      Tenderness: There is no abdominal tenderness.   Musculoskeletal:         General: Swelling present.      Cervical back: Neck supple.      Right lower leg: Edema present.      Left lower leg: Edema present.      Comments: 1+ pitting edema bilaterally   Skin:     General: Skin is warm and dry.      Capillary Refill: Capillary refill takes less than 2 seconds.      Findings: No lesion.   Neurological:      Mental Status: She is alert and oriented to person, place, and time.   Psychiatric:         Mood and Affect: Mood normal.          Additional Data:     Lab Results:  Results from last 7 days   Lab Units 07/03/24  1430   WBC Thousand/uL 8.52   HEMOGLOBIN g/dL 15.0   HEMATOCRIT % 47.5*   PLATELETS Thousands/uL 236   SEGS PCT % 81*   LYMPHO PCT % 9*   MONO PCT % 7   EOS PCT % 2     Results from last 7 days   Lab Units 07/03/24  1430   SODIUM mmol/L 136   POTASSIUM mmol/L 3.9   CHLORIDE mmol/L 101   CO2 mmol/L 24   BUN mg/dL 18   CREATININE mg/dL 1.17   ANION GAP mmol/L 11   CALCIUM mg/dL 10.4*   ALBUMIN g/dL 4.1   TOTAL BILIRUBIN mg/dL 1.49*   ALK PHOS U/L 69   ALT U/L 14   AST U/L 19   GLUCOSE RANDOM mg/dL 120     Results from last 7 days   Lab Units 07/03/24  1714   INR   1.66*         Lab Results   Component Value Date    HGBA1C 6.4 (H) 12/07/2023    HGBA1C 6.7 (H) 05/11/2023    HGBA1C 7.0 (H) 08/23/2022           Lines/Drains:  Invasive Devices       Peripheral Intravenous Line  Duration             Peripheral IV 07/03/24 Proximal;Right;Ventral (anterior) Forearm <1 day                        Imaging: Reviewed radiology reports from this admission including: CT head  CT head without contrast   Final Result by Isrrael Rivero MD (07/03 1518)      No acute intracranial abnormality.                  Workstation performed: [id number]         XR chest 1 view    (Results Pending)       EKG and Other Studies Reviewed on Admission:   EKG: Atrial flutter. HR 76.    ** Please Note: This note has been constructed using a voice recognition system. **

## 2024-07-04 PROBLEM — R21 FACIAL RASH: Status: ACTIVE | Noted: 2024-07-04

## 2024-07-04 PROBLEM — A46: Status: ACTIVE | Noted: 2024-07-04

## 2024-07-04 LAB
ALBUMIN SERPL BCG-MCNC: 3.6 G/DL (ref 3.5–5)
ALP SERPL-CCNC: 68 U/L (ref 34–104)
ALT SERPL W P-5'-P-CCNC: 12 U/L (ref 7–52)
ANION GAP SERPL CALCULATED.3IONS-SCNC: 10 MMOL/L (ref 4–13)
AST SERPL W P-5'-P-CCNC: 21 U/L (ref 13–39)
ATRIAL RATE: 110 BPM
ATRIAL RATE: 70 BPM
BILIRUB DIRECT SERPL-MCNC: 0.27 MG/DL (ref 0–0.2)
BILIRUB SERPL-MCNC: 0.86 MG/DL (ref 0.2–1)
BUN SERPL-MCNC: 16 MG/DL (ref 5–25)
CALCIUM SERPL-MCNC: 9.6 MG/DL (ref 8.4–10.2)
CHLORIDE SERPL-SCNC: 106 MMOL/L (ref 96–108)
CO2 SERPL-SCNC: 22 MMOL/L (ref 21–32)
CREAT SERPL-MCNC: 0.96 MG/DL (ref 0.6–1.3)
ERYTHROCYTE [DISTWIDTH] IN BLOOD BY AUTOMATED COUNT: 15.5 % (ref 11.6–15.1)
GFR SERPL CREATININE-BSD FRML MDRD: 55 ML/MIN/1.73SQ M
GLUCOSE SERPL-MCNC: 91 MG/DL (ref 65–140)
HCT VFR BLD AUTO: 46.8 % (ref 34.8–46.1)
HGB BLD-MCNC: 14.4 G/DL (ref 11.5–15.4)
INR PPP: 1.99 (ref 0.84–1.19)
MAGNESIUM SERPL-MCNC: 2.1 MG/DL (ref 1.9–2.7)
MCH RBC QN AUTO: 29.4 PG (ref 26.8–34.3)
MCHC RBC AUTO-ENTMCNC: 30.8 G/DL (ref 31.4–37.4)
MCV RBC AUTO: 96 FL (ref 82–98)
PLATELET # BLD AUTO: 213 THOUSANDS/UL (ref 149–390)
PMV BLD AUTO: 10.9 FL (ref 8.9–12.7)
POTASSIUM SERPL-SCNC: 3.8 MMOL/L (ref 3.5–5.3)
PROT SERPL-MCNC: 6.2 G/DL (ref 6.4–8.4)
PROTHROMBIN TIME: 23.4 SECONDS (ref 11.6–14.5)
QRS AXIS: -71 DEGREES
QRS AXIS: -77 DEGREES
QRSD INTERVAL: 152 MS
QRSD INTERVAL: 156 MS
QT INTERVAL: 434 MS
QT INTERVAL: 458 MS
QTC INTERVAL: 480 MS
QTC INTERVAL: 488 MS
RBC # BLD AUTO: 4.9 MILLION/UL (ref 3.81–5.12)
SODIUM SERPL-SCNC: 138 MMOL/L (ref 135–147)
T WAVE AXIS: 106 DEGREES
T WAVE AXIS: 109 DEGREES
TSH SERPL DL<=0.05 MIU/L-ACNC: 1.48 UIU/ML (ref 0.45–4.5)
VENTRICULAR RATE: 66 BPM
VENTRICULAR RATE: 76 BPM
WBC # BLD AUTO: 9 THOUSAND/UL (ref 4.31–10.16)

## 2024-07-04 PROCEDURE — 93010 ELECTROCARDIOGRAM REPORT: CPT | Performed by: INTERNAL MEDICINE

## 2024-07-04 PROCEDURE — 99232 SBSQ HOSP IP/OBS MODERATE 35: CPT | Performed by: INTERNAL MEDICINE

## 2024-07-04 PROCEDURE — 80048 BASIC METABOLIC PNL TOTAL CA: CPT

## 2024-07-04 PROCEDURE — 85610 PROTHROMBIN TIME: CPT

## 2024-07-04 PROCEDURE — 80076 HEPATIC FUNCTION PANEL: CPT

## 2024-07-04 PROCEDURE — 83735 ASSAY OF MAGNESIUM: CPT

## 2024-07-04 PROCEDURE — 84443 ASSAY THYROID STIM HORMONE: CPT | Performed by: INTERNAL MEDICINE

## 2024-07-04 PROCEDURE — 85027 COMPLETE CBC AUTOMATED: CPT

## 2024-07-04 RX ORDER — TRIAMCINOLONE ACETONIDE 5 MG/G
CREAM TOPICAL 2 TIMES DAILY
Status: DISCONTINUED | OUTPATIENT
Start: 2024-07-04 | End: 2024-07-09 | Stop reason: HOSPADM

## 2024-07-04 RX ORDER — FUROSEMIDE 20 MG/1
20 TABLET ORAL DAILY
Status: DISCONTINUED | OUTPATIENT
Start: 2024-07-05 | End: 2024-07-06

## 2024-07-04 RX ORDER — CEFAZOLIN SODIUM 2 G/50ML
2000 SOLUTION INTRAVENOUS EVERY 8 HOURS
Status: COMPLETED | OUTPATIENT
Start: 2024-07-04 | End: 2024-07-08

## 2024-07-04 RX ADMIN — ATORVASTATIN CALCIUM 40 MG: 40 TABLET, FILM COATED ORAL at 17:16

## 2024-07-04 RX ADMIN — METOPROLOL TARTRATE 25 MG: 25 TABLET, FILM COATED ORAL at 20:38

## 2024-07-04 RX ADMIN — LEVOTHYROXINE SODIUM 50 MCG: 50 TABLET ORAL at 05:01

## 2024-07-04 RX ADMIN — AMITRIPTYLINE HYDROCHLORIDE 20 MG: 10 TABLET, FILM COATED ORAL at 09:47

## 2024-07-04 RX ADMIN — FUROSEMIDE 40 MG: 10 INJECTION, SOLUTION INTRAMUSCULAR; INTRAVENOUS at 09:52

## 2024-07-04 RX ADMIN — POTASSIUM CHLORIDE 10 MEQ: 750 TABLET, EXTENDED RELEASE ORAL at 09:47

## 2024-07-04 RX ADMIN — TRIAMCINOLONE ACETONIDE: 5 CREAM TOPICAL at 14:24

## 2024-07-04 RX ADMIN — PERPHENAZINE 4 MG: 4 TABLET, FILM COATED ORAL at 09:48

## 2024-07-04 RX ADMIN — METOPROLOL TARTRATE 50 MG: 50 TABLET, FILM COATED ORAL at 09:47

## 2024-07-04 RX ADMIN — LISINOPRIL 20 MG: 20 TABLET ORAL at 09:47

## 2024-07-04 RX ADMIN — CEFAZOLIN SODIUM 2000 MG: 2 SOLUTION INTRAVENOUS at 13:40

## 2024-07-04 RX ADMIN — CEFAZOLIN SODIUM 2000 MG: 2 SOLUTION INTRAVENOUS at 20:38

## 2024-07-04 RX ADMIN — WARFARIN SODIUM 4 MG: 4 TABLET ORAL at 20:38

## 2024-07-04 NOTE — ASSESSMENT & PLAN NOTE
Calcium 10.4 on admission  In the setting of poor oral intake  Will check ionized calcium  Monitor renal function

## 2024-07-04 NOTE — ASSESSMENT & PLAN NOTE
Wt Readings from Last 3 Encounters:   07/04/24 83.5 kg (184 lb 1.4 oz)   04/02/23 83.2 kg (183 lb 6.8 oz)   08/27/21 85.3 kg (188 lb)     Lab Results   Component Value Date     (H) 07/03/2024     (H) 11/13/2023       Presents with dyspnea on exertion and lower extremity edema  Patient is currently volume overloaded.  Last echo in June 2023 showed LVEF 60 to 65% with normal systolic function. Indeterminate diastolic function due to atrial fibrillation.     Plan:  Home meds GDMT: Diuretic: Oral Lasix 20 mg daily, B-Blocker: Metoprolol daily, ACE/ARB/ARNi: Lisinopril 20 mg daily  Sodium restriction 2g  CMP, magnesium tomorrow a.m; Goal Mg > 2 and K > 4; Replete prn  HOB > 30°, Daily standing weights, Measure I/O  Consult nutrition for dietary education  IV Lasix 40 mg twice daily while inpatient.

## 2024-07-04 NOTE — PROGRESS NOTES
[facility]  Progress Note  Name: Nola Welch I  MRN: [id number]  Unit/Bed#: S -01 I Date of Admission: 7/3/2024   Date of Service: 7/4/2024 I Hospital Day: 1    Assessment & Plan   Fall  Assessment & Plan  Patient reports that she let herself down at home this afternoon while sorting out garbage in the garage.  Denied head strike or injury.  Patient on Coumadin for A-fib.  The head was negative.  Admitted for placement issue.    Plans:  Fall precaution  Check orthostatic blood pressure  Case management consult  PT/OT    * Acute exacerbation of CHF (congestive heart failure) ([facility])  Assessment & Plan  Wt Readings from Last 3 Encounters:   07/04/24 83.5 kg (184 lb 1.4 oz)   04/02/23 83.2 kg (183 lb 6.8 oz)   08/27/21 85.3 kg (188 lb)     Lab Results   Component Value Date     (H) 07/03/2024     (H) 11/13/2023       Presents with dyspnea on exertion and lower extremity edema  Patient is currently volume overloaded.  Last echo in June 2023 showed LVEF 60 to 65% with normal systolic function. Indeterminate diastolic function due to atrial fibrillation.     Plan:  Home meds GDMT: Diuretic: Oral Lasix 20 mg daily, B-Blocker: Metoprolol daily, ACE/ARB/ARNi: Lisinopril 20 mg daily  Sodium restriction 2g  CMP, magnesium tomorrow a.m; Goal Mg > 2 and K > 4; Replete prn  HOB > 30°, Daily standing weights, Measure I/O  Consult nutrition for dietary education  Received IV Lasix 40 mg twice daily on admission  Switch to p.o. Lasix tomorrow home dose 20 mg daily    Erysipelas of both lower extremities  Assessment & Plan  Patient presented to [facility] ED on 7/2 for ankle rash.  Was prescribed Keflex times 3 times daily and discharged.  Patient not compliant with medication and return to [facility] for fall/dyspnea along with ongoing erythema, tenderness of bilateral lower extremities.    Plan  Start Ancef while in the hospital.  Reported penicillin allergy as a kid but will  continue to monitor  Continue to monitor and maria d the area  If continues to progress we will consider dermatology consult    Total bilirubin, elevated  Assessment & Plan  Total bilirubin 1.49 on admission in the setting of poor intake.    7/4 resolved    Hypercalcemia  Assessment & Plan  Calcium 10.4 on admission  In the setting of poor oral intake    Resolved    Diarrhea  Assessment & Plan  Patient reports watery diarrhea usually 3 times per day.  No bowel movement today yet.  Monitor symptoms  Consider to check stool test if symptomatic.    HLD (hyperlipidemia)  Assessment & Plan  Continue home med Lipitor    Hypothyroidism  Assessment & Plan  Continue home med levothyroxine    Atrial fibrillation with RVR (HCC)  Assessment & Plan  Patient reports A-fib on warfarin 4 mg Monday to Friday.      Lab Results   Component Value Date    INR 1.99 (H) 07/04/2024    INR 1.66 (H) 07/03/2024    INR 2.1 06/01/2024      Continue home med warfarin and monitor INR in the morning.               VTE Pharmacologic Prophylaxis: VTE Score: 6 High Risk (Score >/= 5) - Pharmacological DVT Prophylaxis Ordered: warfarin (Coumadin). Sequential Compression Devices Ordered.    Mobility:   Basic Mobility Inpatient Raw Score: 13  -[facility] Goal: 4: Move to chair/commode  -HL Achieved: 2: Bed activities/Dependent transfer      Patient Centered Rounds: I performed bedside rounds with nursing staff today.   Discussions with Specialists or Other Care Team Provider:     Education and Discussions with Family / Patient:  Will update family.     Total Time Spent on Date of Encounter in care of patient: 30 mins. This time was spent on one or more of the following: performing physical exam; counseling and coordination of care; obtaining or reviewing history; documenting in the medical record; reviewing/ordering tests, medications or procedures; communicating with other healthcare professionals and discussing with patient's family/caregivers.    Current  Length of Stay: 1 day(s)  Current Patient Status: Inpatient   Certification Statement:   Discharge Plan: Anticipate discharge in 48 hrs to pending placement    Code Status: Level 1 - Full Code    Subjective:   Patient was seen and examined at bedside this morning.  Patient comfortable laying in bed not in any acute distress.  Patient currently on room air saturating well, denies having chest pain or shortness of breath, abdominal pain or any other concerns.  Patient still has redness on her legs bilaterally and tenderness and erythematous looking.  Otherwise other questions or concerns this time.     Objective:     Vitals:   Temp (24hrs), Av.3 °F (36.8 °C), Min:97.7 °F (36.5 °C), Max:98.7 °F (37.1 °C)    Temp:  [97.7 °F (36.5 °C)-98.7 °F (37.1 °C)] 97.7 °F (36.5 °C)  HR:  [54-81] 59  Resp:  [20] 20  BP: (108-196)/() 145/94  SpO2:  [92 %-99 %] 95 %  Body mass index is 31.11 kg/m².     Input and Output Summary (last 24 hours):     Intake/Output Summary (Last 24 hours) at 2024 1235  Last data filed at 2024 0922  Gross per 24 hour   Intake 1520 ml   Output 900 ml   Net 620 ml       Physical Exam:   Physical Exam  Vitals and nursing note reviewed.   Constitutional:       General: She is not in acute distress.     Appearance: She is well-developed.   HENT:      Head: Normocephalic and atraumatic.      Mouth/Throat:      Mouth: Mucous membranes are moist.      Pharynx: Oropharynx is clear.   Eyes:      Extraocular Movements: Extraocular movements intact.      Conjunctiva/sclera: Conjunctivae normal.      Pupils: Pupils are equal, round, and reactive to light.   Neck:      Comments: JVD  Cardiovascular:      Rate and Rhythm: Normal rate and regular rhythm.      Heart sounds: Normal heart sounds. No murmur heard.  Pulmonary:      Effort: Pulmonary effort is normal. No respiratory distress.      Breath sounds: Normal breath sounds. No wheezing or rales.   Abdominal:      General: Bowel sounds are normal.  There is no distension.      Palpations: Abdomen is soft.      Tenderness: There is no abdominal tenderness.   Musculoskeletal:         General: Swelling present.      Cervical back: Neck supple.      Right lower leg: Edema present.      Left lower leg: Edema present.      Comments: 1+ pitting edema bilaterally   Skin:     General: Skin is warm and dry.      Capillary Refill: Capillary refill takes less than 2 seconds.      Findings: Erythema (Lower extremities bilaterally) and rash (Facial rash) present. No lesion.   Neurological:      Mental Status: She is alert and oriented to person, place, and time.   Psychiatric:         Mood and Affect: Mood normal.          Additional Data:     Labs:  Results from last 7 days   Lab Units 07/04/24  0442 07/03/24  1430   WBC Thousand/uL 9.00 8.52   HEMOGLOBIN g/dL 14.4 15.0   HEMATOCRIT % 46.8* 47.5*   PLATELETS Thousands/uL 213 236   SEGS PCT %  --  81*   LYMPHO PCT %  --  9*   MONO PCT %  --  7   EOS PCT %  --  2     Results from last 7 days   Lab Units 07/04/24  0442   SODIUM mmol/L 138   POTASSIUM mmol/L 3.8   CHLORIDE mmol/L 106   CO2 mmol/L 22   BUN mg/dL 16   CREATININE mg/dL 0.96   ANION GAP mmol/L 10   CALCIUM mg/dL 9.6   ALBUMIN g/dL 3.6   TOTAL BILIRUBIN mg/dL 0.86   ALK PHOS U/L 68   ALT U/L 12   AST U/L 21   GLUCOSE RANDOM mg/dL 91     Results from last 7 days   Lab Units 07/04/24  0627   INR  1.99*                   Lines/Drains:  Invasive Devices       Peripheral Intravenous Line  Duration             Peripheral IV 07/03/24 Proximal;Right;Ventral (anterior) Forearm <1 day                          Imaging:     Recent Cultures (last 7 days):         Last 24 Hours Medication List:   Current Facility-Administered Medications   Medication Dose Route Frequency Provider Last Rate    acetaminophen  650 mg Oral Q6H PRN May Allison Donohue MD      perphenazine  4 mg Oral Daily May Allison Donohue MD      And    amitriptyline  20 mg Oral Daily May Allison Donohue MD      atorvastatin  40 mg  Oral QPM May Allison Donohue MD      calcium carbonate  1,000 mg Oral Daily PRN May Allison Donohue MD      cefazolin  2,000 mg Intravenous Q8H Farrah Suarez MD      [START ON 7/5/2024] furosemide  20 mg Oral Daily Ree Courtney MD      levothyroxine  50 mcg Oral Early Morning May Allison Donohue MD      lisinopril  20 mg Oral Daily May Allison Donohue MD      metoprolol tartrate  25 mg Oral Q12H [facility] Farrah Suarez MD      nystatin   Topical BID May Allison Donohue MD      ondansetron  4 mg Intravenous Q6H PRN May Allison Donohue MD      potassium chloride  10 mEq Oral Daily May Allison Donohue MD      triamcinolone   Topical BID Farrah Suarez MD      warfarin  4 mg Oral Once per day on Monday Tuesday Wednesday Thursday Friday May Allison Donohue MD          Today, Patient Was Seen By: Ree Courtney MD    **Please Note: This note may have been constructed using a voice recognition system.**

## 2024-07-04 NOTE — ASSESSMENT & PLAN NOTE
Patient reports that she let herself down at home this afternoon while sorting out garbage in the garage.  Denied head strike or injury.  Patient on Coumadin for A-fib.  The head was negative.  Admitted for placement issue.    Plans:  Fall precaution  Check orthostatic blood pressure  Case management consult  PT/OT

## 2024-07-04 NOTE — ASSESSMENT & PLAN NOTE
Patient reports A-fib on warfarin 4 mg Monday to Friday.      Lab Results   Component Value Date    INR 1.66 (H) 07/03/2024    INR 2.1 06/01/2024    INR 1.6 05/17/2024      Patient did not take warfarin this morning.  Continue home med warfarin and monitor INR in the morning.

## 2024-07-04 NOTE — ASSESSMENT & PLAN NOTE
Patient reports A-fib on warfarin 4 mg Monday to Friday.      Lab Results   Component Value Date    INR 1.66 (H) 07/03/2024    INR 2.1 06/01/2024    INR 1.6 05/17/2024      Continue home med warfarin and monitor INR in the morning.

## 2024-07-04 NOTE — ASSESSMENT & PLAN NOTE
Patient reports watery diarrhea usually 3 times per day.  No bowel movement today yet.  Monitor symptoms  Consider to check stool test if symptomatic.

## 2024-07-04 NOTE — ASSESSMENT & PLAN NOTE
Wt Readings from Last 3 Encounters:   07/04/24 83.5 kg (184 lb 1.4 oz)   04/02/23 83.2 kg (183 lb 6.8 oz)   08/27/21 85.3 kg (188 lb)     Lab Results   Component Value Date     (H) 07/03/2024     (H) 11/13/2023       Presents with dyspnea on exertion and lower extremity edema  Patient is currently volume overloaded.  Last echo in June 2023 showed LVEF 60 to 65% with normal systolic function. Indeterminate diastolic function due to atrial fibrillation.     Plan:  Continue home dose lasix 20 mg daily  Home meds GDMT: Diuretic: Oral Lasix 20 mg daily, B-Blocker: Metoprolol daily dose reduced to 25mg BID, ACE/ARB/ARNi: Lisinopril 20 mg daily  Sodium restriction 2g  CMP, magnesium tomorrow a.m; Goal Mg > 2 and K > 4; Replete prn  HOB > 30°, Daily standing weights, Measure I/O  Consult nutrition for dietary education

## 2024-07-04 NOTE — QUICK NOTE
Per patient's sister and patient's request called Shana (stepdaughter) at [phone] and updated her.

## 2024-07-04 NOTE — ASSESSMENT & PLAN NOTE
Patient presented to [facility] ED on 7/2 for ankle rash.  Was prescribed Keflex times 3 times daily and discharged.  Patient not compliant with medication and return to [facility] for fall/dyspnea along with ongoing erythema, tenderness of bilateral lower extremities.    Plan  Start Ancef while in the hospital.  Reported penicillin allergy as a kid but will continue to monitor  Continue to monitor and maria d the area  If continues to progress we will consider dermatology consult

## 2024-07-04 NOTE — PLAN OF CARE
Problem: PAIN - ADULT  Goal: Verbalizes/displays adequate comfort level or baseline comfort level  Description: Interventions:  - Encourage patient to monitor pain and request assistance  - Assess pain using appropriate pain scale  - Administer analgesics based on type and severity of pain and evaluate response  - Implement non-pharmacological measures as appropriate and evaluate response  - Consider cultural and social influences on pain and pain management  - Notify physician/advanced practitioner if interventions unsuccessful or patient reports new pain  Outcome: Progressing     Problem: INFECTION - ADULT  Goal: Absence or prevention of progression during hospitalization  Description: INTERVENTIONS:  - Assess and monitor for signs and symptoms of infection  - Monitor lab/diagnostic results  - Monitor all insertion sites, i.e. indwelling lines, tubes, and drains  - Monitor endotracheal if appropriate and nasal secretions for changes in amount and color  - [place] appropriate cooling/warming therapies per order  - Administer medications as ordered  - Instruct and encourage patient and family to use good hand hygiene technique  - Identify and instruct in appropriate isolation precautions for identified infection/condition  Outcome: Progressing  Goal: Absence of fever/infection during neutropenic period  Description: INTERVENTIONS:  - Monitor WBC    Outcome: Progressing

## 2024-07-04 NOTE — ASSESSMENT & PLAN NOTE
Patient reports A-fib on warfarin 4 mg Monday to Friday.      Lab Results   Component Value Date    INR 1.98 (H) 07/05/2024    INR 1.99 (H) 07/04/2024    INR 1.66 (H) 07/03/2024      Continue home med warfarin and monitor INR in the morning.

## 2024-07-04 NOTE — ASSESSMENT & PLAN NOTE
Patient reports that she let herself down at home this afternoon while sorting out garbage in the garage.  Denied head strike or injury.  Patient on Coumadin for A-fib.  The head was negative.  Admitted for placement issue.    Plans:  Fall precaution  Check orthostatic blood pressure  Case management consult  Monitor intake output in the setting of CHF exacerbation

## 2024-07-04 NOTE — ASSESSMENT & PLAN NOTE
Patient presented to [facility] ED on 7/2 for ankle rash.  Was prescribed Keflex times 3 times daily and discharged.  Patient not compliant with medication and return to [facility] for fall/dyspnea along with ongoing erythema, tenderness of bilateral lower extremities.    Plan  Start Ancef while in the hospital  Continue to monitor and maria d the area  If continues to progress we will consider dermatology consult

## 2024-07-04 NOTE — ASSESSMENT & PLAN NOTE
Wt Readings from Last 3 Encounters:   07/03/24 83 kg (183 lb)   04/02/23 83.2 kg (183 lb 6.8 oz)   08/27/21 85.3 kg (188 lb)     Lab Results   Component Value Date     (H) 07/03/2024     (H) 11/13/2023       Presents with dyspnea on exertion and lower extremity edema  Patient is currently volume overloaded.  Last echo in June 2023 showed LVEF 60 to 65% with normal systolic function. Indeterminate diastolic function due to atrial fibrillation.     Plan:  Home meds GDMT: Diuretic: Oral Lasix 20 mg daily, B-Blocker: Metoprolol daily, ACE/ARB/ARNi: Lisinopril 20 mg daily  Sodium restriction 2g  CMP, magnesium tomorrow a.m; Goal Mg > 2 and K > 4; Replete prn  HOB > 30°, Daily standing weights, Measure I/O  Consult nutrition for dietary education  IV Lasix 40 mg twice daily while inpatient.

## 2024-07-05 LAB
ALBUMIN SERPL BCG-MCNC: 3.5 G/DL (ref 3.5–5)
ALP SERPL-CCNC: 65 U/L (ref 34–104)
ALT SERPL W P-5'-P-CCNC: 9 U/L (ref 7–52)
ANION GAP SERPL CALCULATED.3IONS-SCNC: 9 MMOL/L (ref 4–13)
AST SERPL W P-5'-P-CCNC: 16 U/L (ref 13–39)
BASOPHILS # BLD AUTO: 0.07 THOUSANDS/ÂΜL (ref 0–0.1)
BASOPHILS NFR BLD AUTO: 1 % (ref 0–1)
BILIRUB SERPL-MCNC: 0.82 MG/DL (ref 0.2–1)
BUN SERPL-MCNC: 18 MG/DL (ref 5–25)
CALCIUM SERPL-MCNC: 9.5 MG/DL (ref 8.4–10.2)
CHLORIDE SERPL-SCNC: 103 MMOL/L (ref 96–108)
CO2 SERPL-SCNC: 25 MMOL/L (ref 21–32)
CREAT SERPL-MCNC: 0.89 MG/DL (ref 0.6–1.3)
EOSINOPHIL # BLD AUTO: 0.26 THOUSAND/ÂΜL (ref 0–0.61)
EOSINOPHIL NFR BLD AUTO: 3 % (ref 0–6)
ERYTHROCYTE [DISTWIDTH] IN BLOOD BY AUTOMATED COUNT: 15.6 % (ref 11.6–15.1)
GFR SERPL CREATININE-BSD FRML MDRD: 60 ML/MIN/1.73SQ M
GLUCOSE SERPL-MCNC: 124 MG/DL (ref 65–140)
HCT VFR BLD AUTO: 44.4 % (ref 34.8–46.1)
HGB BLD-MCNC: 14.1 G/DL (ref 11.5–15.4)
IMM GRANULOCYTES # BLD AUTO: 0.04 THOUSAND/UL (ref 0–0.2)
IMM GRANULOCYTES NFR BLD AUTO: 0 % (ref 0–2)
INR PPP: 1.98 (ref 0.84–1.19)
LYMPHOCYTES # BLD AUTO: 1.1 THOUSANDS/ÂΜL (ref 0.6–4.47)
LYMPHOCYTES NFR BLD AUTO: 10 % (ref 14–44)
MCH RBC QN AUTO: 28.9 PG (ref 26.8–34.3)
MCHC RBC AUTO-ENTMCNC: 31.8 G/DL (ref 31.4–37.4)
MCV RBC AUTO: 91 FL (ref 82–98)
MONOCYTES # BLD AUTO: 1.24 THOUSAND/ÂΜL (ref 0.17–1.22)
MONOCYTES NFR BLD AUTO: 12 % (ref 4–12)
NEUTROPHILS # BLD AUTO: 7.84 THOUSANDS/ÂΜL (ref 1.85–7.62)
NEUTS SEG NFR BLD AUTO: 74 % (ref 43–75)
NRBC BLD AUTO-RTO: 0 /100 WBCS
PLATELET # BLD AUTO: 224 THOUSANDS/UL (ref 149–390)
PMV BLD AUTO: 11.2 FL (ref 8.9–12.7)
POTASSIUM SERPL-SCNC: 3.6 MMOL/L (ref 3.5–5.3)
PROT SERPL-MCNC: 6 G/DL (ref 6.4–8.4)
PROTHROMBIN TIME: 23.3 SECONDS (ref 11.6–14.5)
RBC # BLD AUTO: 4.88 MILLION/UL (ref 3.81–5.12)
SODIUM SERPL-SCNC: 137 MMOL/L (ref 135–147)
WBC # BLD AUTO: 10.55 THOUSAND/UL (ref 4.31–10.16)

## 2024-07-05 PROCEDURE — 80053 COMPREHEN METABOLIC PANEL: CPT

## 2024-07-05 PROCEDURE — 99232 SBSQ HOSP IP/OBS MODERATE 35: CPT | Performed by: INTERNAL MEDICINE

## 2024-07-05 PROCEDURE — 85025 COMPLETE CBC W/AUTO DIFF WBC: CPT

## 2024-07-05 PROCEDURE — 85610 PROTHROMBIN TIME: CPT

## 2024-07-05 RX ADMIN — WARFARIN SODIUM 4 MG: 4 TABLET ORAL at 21:31

## 2024-07-05 RX ADMIN — METOPROLOL TARTRATE 25 MG: 25 TABLET, FILM COATED ORAL at 09:21

## 2024-07-05 RX ADMIN — AMITRIPTYLINE HYDROCHLORIDE 20 MG: 10 TABLET, FILM COATED ORAL at 09:21

## 2024-07-05 RX ADMIN — CEFAZOLIN SODIUM 2000 MG: 2 SOLUTION INTRAVENOUS at 11:53

## 2024-07-05 RX ADMIN — FUROSEMIDE 20 MG: 20 TABLET ORAL at 09:21

## 2024-07-05 RX ADMIN — PERPHENAZINE 4 MG: 4 TABLET, FILM COATED ORAL at 09:23

## 2024-07-05 RX ADMIN — ATORVASTATIN CALCIUM 40 MG: 40 TABLET, FILM COATED ORAL at 17:17

## 2024-07-05 RX ADMIN — CEFAZOLIN SODIUM 2000 MG: 2 SOLUTION INTRAVENOUS at 19:44

## 2024-07-05 RX ADMIN — POTASSIUM CHLORIDE 10 MEQ: 750 TABLET, EXTENDED RELEASE ORAL at 09:21

## 2024-07-05 RX ADMIN — LEVOTHYROXINE SODIUM 50 MCG: 50 TABLET ORAL at 05:04

## 2024-07-05 RX ADMIN — METOPROLOL TARTRATE 25 MG: 25 TABLET, FILM COATED ORAL at 21:31

## 2024-07-05 RX ADMIN — LISINOPRIL 20 MG: 20 TABLET ORAL at 09:21

## 2024-07-05 RX ADMIN — TRIAMCINOLONE ACETONIDE: 5 CREAM TOPICAL at 09:23

## 2024-07-05 RX ADMIN — TRIAMCINOLONE ACETONIDE: 5 CREAM TOPICAL at 17:17

## 2024-07-05 RX ADMIN — CEFAZOLIN SODIUM 2000 MG: 2 SOLUTION INTRAVENOUS at 05:04

## 2024-07-05 NOTE — CASE MANAGEMENT
Case Management Assessment & Discharge Planning Note    Patient name Nola Welch  Location W /W -01 MRN [id number]  : 1940 Date 2024       Current Admission Date: 7/3/2024  Current Admission Diagnosis:Acute exacerbation of CHF (congestive heart failure) (HCC)   Patient Active Problem List    Diagnosis Date Noted Date Diagnosed    Erysipelas of both lower extremities 2024     Facial rash 2024     Acute exacerbation of CHF (congestive heart failure) (HCC) 2024     Hypercalcemia 2024     Total bilirubin, elevated 2024     Fall 2023     Scalp hematoma, initial encounter 2023     Diarrhea 2023     Atrial fibrillation with RVR ([facility]) 2016     Acute respiratory failure with hypoxia ([facility]) 2016     Depression 2016     HTN (hypertension), benign 2016     Hypothyroidism 2016     HLD (hyperlipidemia) 2016       LOS (days): 2  Geometric Mean LOS (GMLOS) (days):   Days to GMLOS:     OBJECTIVE:    Risk of Unplanned Readmission Score: 13.23         Current admission status: Inpatient       Preferred Pharmacy:   [facility]/pharmacy #[id number] - [place], [place] - [address]  [address] [address]  Phone: [phone] Fax: [phone]    Primary Care Provider: Kelsey King MD    Primary Insurance: MEDICARE  Secondary Insurance: AETNA    ASSESSMENT:  Active Health Care Proxies    There are no active Health Care Proxies on file.                      Patient Information  Admitted from:: Home  Mental Status: Alert  During Assessment patient was accompanied by: Other-Comment (Step daughter)  Assessment information provided by:: Patient  Primary Caregiver: Self  Support Systems: Self, Friends/neighbors, Family members  Home entry access options. Select all that apply.: Stairs  Number of steps to enter home.: 4  Do the steps have railings?: Yes  Type of Current Residence: [facility]  Living Arrangements: Lives Alone  Is  patient a ?: No    Activities of Daily Living Prior to Admission  Functional Status: Independent  Completes ADLs independently?: Yes  Ambulates independently?: Yes  Does patient use assisted devices?: Yes  Assisted Devices (DME) used: Walker, Straight Cane  Does patient currently own DME?: Yes  What DME does the patient currently own?: Walker, Straight Cane  Does patient have a history of Outpatient Therapy (PT/OT)?: Yes  Does the patient have a history of Short-Term Rehab?: Yes ([facility])  Does patient have a history of HHC?: Yes  Does patient currently have HHC?: No         Patient Information Continued  Income Source: Pension/intermediate  Does patient have prescription coverage?: Yes  Does patient receive dialysis treatments?: No  Does patient have a history of substance abuse?: No         Means of Transportation  Means of Transport to [facility]:: Drives Self      Social Determinants of Health (SDOH)      Flowsheet Row Most Recent Value   Housing Stability    In the last 12 months, was there a time when you were not able to pay the mortgage or rent on time? N   In the past 12 months, how many times have you moved where you were living? 0   At any time in the past 12 months, were you homeless or living in a shelter (including now)? N   Transportation Needs    In the past 12 months, has lack of transportation kept you from medical appointments or from getting medications? no   In the past 12 months, has lack of transportation kept you from meetings, work, or from getting things needed for daily living? No   Food Insecurity    Within the past 12 months, you worried that your food would run out before you got the money to buy more. Never true   Within the past 12 months, the food you bought just didn't last and you didn't have money to get more. Never true   Utilities    In the past 12 months has the electric, gas, oil, or water company threatened to shut off services in your home? No            DISCHARGE  DETAILS:    Discharge planning discussed with:: Patient     Comments - Mobile of Choice: CM met with patient at bedside. CM introduced self and CM role. Laina lives alone, single level home, drives. Uses a cane and walker, no O2, no HHC services at this time. Therapy evaluations pending, CM will follow. Patient would prefer to go home with [facility].  CM contacted family/caregiver?: Yes  Were Treatment Team discharge recommendations reviewed with patient/caregiver?: Yes  Did patient/caregiver verbalize understanding of patient care needs?: Yes  Were patient/caregiver advised of the risks associated with not following Treatment Team discharge recommendations?: Yes    Contacts  Patient Contacts: Shana step-daughter  Relationship to Patient:: Family  Contact Method: In Person  Reason/Outcome: Discharge Planning              Other Referral/Resources/Interventions Provided:  Referral Comments: Therapy evaluations pending

## 2024-07-05 NOTE — PLAN OF CARE
Problem: PAIN - ADULT  Goal: Verbalizes/displays adequate comfort level or baseline comfort level  Description: Interventions:  - Encourage patient to monitor pain and request assistance  - Assess pain using appropriate pain scale  - Administer analgesics based on type and severity of pain and evaluate response  - Implement non-pharmacological measures as appropriate and evaluate response  - Consider cultural and social influences on pain and pain management  - Notify physician/advanced practitioner if interventions unsuccessful or patient reports new pain  Outcome: Progressing     Problem: INFECTION - ADULT  Goal: Absence or prevention of progression during hospitalization  Description: INTERVENTIONS:  - Assess and monitor for signs and symptoms of infection  - Monitor lab/diagnostic results  - Monitor all insertion sites, i.e. indwelling lines, tubes, and drains  - Monitor endotracheal if appropriate and nasal secretions for changes in amount and color  - [place] appropriate cooling/warming therapies per order  - Administer medications as ordered  - Instruct and encourage patient and family to use good hand hygiene technique  - Identify and instruct in appropriate isolation precautions for identified infection/condition  Outcome: Progressing  Goal: Absence of fever/infection during neutropenic period  Description: INTERVENTIONS:  - Monitor WBC    Outcome: Progressing     Problem: SAFETY ADULT  Goal: Patient will remain free of falls  Description: INTERVENTIONS:  - Educate patient/family on patient safety including physical limitations  - Instruct patient to call for assistance with activity   - Consult OT/PT to assist with strengthening/mobility   - Keep Call bell within reach  - Keep bed low and locked with side rails adjusted as appropriate  - Keep care items and personal belongings within reach  - Initiate and maintain comfort rounds  - Make Fall Risk Sign visible to staff  - Offer Toileting every 2 Hours,  in advance of need  - Initiate/Maintain bed alarm  - Obtain necessary fall risk management equipment: alarms  - Apply yellow socks and bracelet for high fall risk patients  - Consider moving patient to room near nurses station  Outcome: Progressing  Goal: Maintain or return to baseline ADL function  Description: INTERVENTIONS:  -  Assess patient's ability to carry out ADLs; assess patient's baseline for ADL function and identify physical deficits which impact ability to perform ADLs (bathing, care of mouth/teeth, toileting, grooming, dressing, etc.)  - Assess/evaluate cause of self-care deficits   - Assess range of motion  - Assess patient's mobility; develop plan if impaired  - Assess patient's need for assistive devices and provide as appropriate  - Encourage maximum independence but intervene and supervise when necessary  - Involve family in performance of ADLs  - Assess for home care needs following discharge   - Consider OT consult to assist with ADL evaluation and planning for discharge  - Provide patient education as appropriate  Outcome: Progressing  Goal: Maintains/Returns to pre admission functional level  Description: INTERVENTIONS:  - Perform AM-PAC 6 Click Basic Mobility/ Daily Activity assessment daily.  - Set and communicate daily mobility goal to care team and patient/family/caregiver.   - Collaborate with rehabilitation services on mobility goals if consulted  - Perform Range of Motion 3 times a day.  - Reposition patient every 2 hours.  - Dangle patient 3 times a day  - Stand patient 3 times a day  - Ambulate patient 3 times a day  - Out of bed to chair 3 times a day   - Out of bed for meals 3 times a day  - Out of bed for toileting  - Record patient progress and toleration of activity level   Outcome: Progressing     Problem: DISCHARGE PLANNING  Goal: Discharge to home or other facility with appropriate resources  Description: INTERVENTIONS:  - Identify barriers to discharge w/patient and  caregiver  - Arrange for needed discharge resources and transportation as appropriate  - Identify discharge learning needs (meds, wound care, etc.)  - Arrange for interpretive services to assist at discharge as needed  - Refer to Case Management Department for coordinating discharge planning if the patient needs post-hospital services based on physician/advanced practitioner order or complex needs related to functional status, cognitive ability, or social support system  Outcome: Progressing     Problem: Knowledge Deficit  Goal: Patient/family/caregiver demonstrates understanding of disease process, treatment plan, medications, and discharge instructions  Description: Complete learning assessment and assess knowledge base.  Interventions:  - Provide teaching at level of understanding  - Provide teaching via preferred learning methods  Outcome: Progressing     Problem: Prexisting or High Potential for Compromised Skin Integrity  Goal: Skin integrity is maintained or improved  Description: INTERVENTIONS:  - Identify patients at risk for skin breakdown  - Assess and monitor skin integrity  - Assess and monitor nutrition and hydration status  - Monitor labs   - Assess for incontinence   - Turn and reposition patient  - Assist with mobility/ambulation  - Relieve pressure over bony prominences  - Avoid friction and shearing  - Provide appropriate hygiene as needed including keeping skin clean and dry  - Evaluate need for skin moisturizer/barrier cream  - Collaborate with interdisciplinary team   - Patient/family teaching  - Consider wound care consult   Outcome: Progressing     Problem: Nutrition/Hydration-ADULT  Goal: Nutrient/Hydration intake appropriate for improving, restoring or maintaining nutritional needs  Description: Monitor and assess patient's nutrition/hydration status for malnutrition. Collaborate with interdisciplinary team and initiate plan and interventions as ordered.  Monitor patient's weight and  dietary intake as ordered or per policy. Utilize nutrition screening tool and intervene as necessary. Determine patient's food preferences and provide high-protein, high-caloric foods as appropriate.     INTERVENTIONS:  - Monitor oral intake, urinary output, labs, and treatment plans  - Assess nutrition and hydration status and recommend course of action  - Evaluate amount of meals eaten  - Assist patient with eating if necessary   - Allow adequate time for meals  - Recommend/ encourage appropriate diets, oral nutritional supplements, and vitamin/mineral supplements  - Order, calculate, and assess calorie counts as needed  - Recommend, monitor, and adjust tube feedings and TPN/PPN based on assessed needs  - Assess need for intravenous fluids  - Provide specific nutrition/hydration education as appropriate  - Include patient/family/caregiver in decisions related to nutrition  Outcome: Progressing     Problem: Potential for Falls  Goal: Patient will remain free of falls  Description: INTERVENTIONS:  - Educate patient/family on patient safety including physical limitations  - Instruct patient to call for assistance with activity   - Consult OT/PT to assist with strengthening/mobility   - Keep Call bell within reach  - Keep bed low and locked with side rails adjusted as appropriate  - Keep care items and personal belongings within reach  - Initiate and maintain comfort rounds  - Make Fall Risk Sign visible to staff  - Offer Toileting every 2 Hours, in advance of need  - Initiate/Maintain bed alarm  - Obtain necessary fall risk management equipment: alarms  - Apply yellow socks and bracelet for high fall risk patients  - Consider moving patient to room near nurses station  Outcome: Progressing

## 2024-07-05 NOTE — PROGRESS NOTES
[facility]  Progress Note  Name: Nola Welch I  MRN: [id number]  Unit/Bed#: W -01 I Date of Admission: 7/3/2024   Date of Service: 7/5/2024 I Hospital Day: 2    Assessment & Plan   Fall  Assessment & Plan  Patient reports that she let herself down at home this afternoon while sorting out garbage in the garage.  Denied head strike or injury.  Patient on Coumadin for A-fib.  The head was negative.  Admitted for placement issue.    Plans:  Fall precaution  Check orthostatic blood pressure  Case management consult  PT/OT    * Acute exacerbation of CHF (congestive heart failure) ([facility])  Assessment & Plan  Wt Readings from Last 3 Encounters:   07/04/24 83.5 kg (184 lb 1.4 oz)   04/02/23 83.2 kg (183 lb 6.8 oz)   08/27/21 85.3 kg (188 lb)     Lab Results   Component Value Date     (H) 07/03/2024     (H) 11/13/2023       Presents with dyspnea on exertion and lower extremity edema  Patient is currently volume overloaded.  Last echo in June 2023 showed LVEF 60 to 65% with normal systolic function. Indeterminate diastolic function due to atrial fibrillation.     Plan:  Continue home dose lasix 20 mg daily  Home meds GDMT: Diuretic: Oral Lasix 20 mg daily, B-Blocker: Metoprolol daily dose reduced to 25mg BID, ACE/ARB/ARNi: Lisinopril 20 mg daily  Sodium restriction 2g  CMP, magnesium tomorrow a.m; Goal Mg > 2 and K > 4; Replete prn  HOB > 30°, Daily standing weights, Measure I/O  Consult nutrition for dietary education    Erysipelas of both lower extremities  Assessment & Plan  Patient presented to [facility] ED on 7/2 for ankle rash.  Was prescribed Keflex times 3 times daily and discharged.  Patient not compliant with medication and return to [facility] for fall/dyspnea along with ongoing erythema, tenderness of bilateral lower extremities.    Plan  Start Ancef while in the hospital.  Reported penicillin allergy as a kid but will continue to monitor  Continue to monitor and  maria d the area  If continues to progress we will consider dermatology consult    Total bilirubin, elevated  Assessment & Plan  Total bilirubin 1.49 on admission in the setting of poor intake.    7/4 resolved    Hypercalcemia  Assessment & Plan  Calcium 10.4 on admission  In the setting of poor oral intake    Resolved    Diarrhea  Assessment & Plan  Patient reports watery diarrhea usually 3 times per day.  No bowel movement today yet.  Monitor symptoms  Consider to check stool test if symptomatic.    HLD (hyperlipidemia)  Assessment & Plan  Continue home med Lipitor    Hypothyroidism  Assessment & Plan  Continue home med levothyroxine    Atrial fibrillation with RVR (HCC)  Assessment & Plan  Patient reports A-fib on warfarin 4 mg Monday to Friday.      Lab Results   Component Value Date    INR 1.98 (H) 07/05/2024    INR 1.99 (H) 07/04/2024    INR 1.66 (H) 07/03/2024      Continue home med warfarin and monitor INR in the morning.               VTE Pharmacologic Prophylaxis: VTE Score: 6 High Risk (Score >/= 5) - Pharmacological DVT Prophylaxis Ordered: warfarin (Coumadin). Sequential Compression Devices Ordered.    Mobility:   Basic Mobility Inpatient Raw Score: 17  -[facility] Goal: 5: Stand one or more mins  -HL Achieved: 3: Sit at edge of bed      Patient Centered Rounds: I performed bedside rounds with nursing staff today.   Discussions with Specialists or Other Care Team Provider:     Education and Discussions with Family / Patient:  will talk to family.     Total Time Spent on Date of Encounter in care of patient: 30 mins. This time was spent on one or more of the following: performing physical exam; counseling and coordination of care; obtaining or reviewing history; documenting in the medical record; reviewing/ordering tests, medications or procedures; communicating with other healthcare professionals and discussing with patient's family/caregivers.    Current Length of Stay: 2 day(s)  Current Patient Status:  Inpatient   Certification Statement:   Discharge Plan: Anticipate discharge in 24-48 hrs to home with home services.    Code Status: Level 1 - Full Code    Subjective:   Patient was seen and examined at bedside this morning. She was comfortably laying in bed not in any acute distress. No questions or concerns at this time.     Objective:     Vitals:   Temp (24hrs), Av.8 °F (36.6 °C), Min:97.2 °F (36.2 °C), Max:98.3 °F (36.8 °C)    Temp:  [97.2 °F (36.2 °C)-98.3 °F (36.8 °C)] 97.2 °F (36.2 °C)  HR:  [55-73] 73  Resp:  [14-18] 14  BP: (123-179)/(76-94) 179/94  SpO2:  [91 %-95 %] 95 %  Body mass index is 31.33 kg/m².     Input and Output Summary (last 24 hours):     Intake/Output Summary (Last 24 hours) at 2024 1039  Last data filed at 2024 0952  Gross per 24 hour   Intake 510 ml   Output 1200 ml   Net -690 ml       Physical Exam:   Physical Exam  Vitals and nursing note reviewed.   Constitutional:       General: She is not in acute distress.     Appearance: She is well-developed.   HENT:      Head: Normocephalic and atraumatic.      Mouth/Throat:      Mouth: Mucous membranes are moist.      Pharynx: Oropharynx is clear.   Eyes:      Extraocular Movements: Extraocular movements intact.      Conjunctiva/sclera: Conjunctivae normal.      Pupils: Pupils are equal, round, and reactive to light.   Neck:      Comments: JVD  Cardiovascular:      Rate and Rhythm: Normal rate and regular rhythm.      Heart sounds: Normal heart sounds. No murmur heard.  Pulmonary:      Effort: Pulmonary effort is normal. No respiratory distress.      Breath sounds: Normal breath sounds. No wheezing or rales.   Abdominal:      General: Bowel sounds are normal. There is no distension.      Palpations: Abdomen is soft.      Tenderness: There is no abdominal tenderness.   Musculoskeletal:         General: Swelling present.      Cervical back: Neck supple.      Right lower leg: Edema present.      Left lower leg: Edema present.       Comments: 1+ pitting edema bilaterally   Skin:     General: Skin is warm and dry.      Capillary Refill: Capillary refill takes less than 2 seconds.      Findings: Erythema (Lower extremities bilaterally) and rash (Facial rash) present. No lesion.   Neurological:      Mental Status: She is alert and oriented to person, place, and time.   Psychiatric:         Mood and Affect: Mood normal.          Additional Data:     Labs:  Results from last 7 days   Lab Units 07/05/24  0613   WBC Thousand/uL 10.55*   HEMOGLOBIN g/dL 14.1   HEMATOCRIT % 44.4   PLATELETS Thousands/uL 224   SEGS PCT % 74   LYMPHO PCT % 10*   MONO PCT % 12   EOS PCT % 3     Results from last 7 days   Lab Units 07/05/24  0613   SODIUM mmol/L 137   POTASSIUM mmol/L 3.6   CHLORIDE mmol/L 103   CO2 mmol/L 25   BUN mg/dL 18   CREATININE mg/dL 0.89   ANION GAP mmol/L 9   CALCIUM mg/dL 9.5   ALBUMIN g/dL 3.5   TOTAL BILIRUBIN mg/dL 0.82   ALK PHOS U/L 65   ALT U/L 9   AST U/L 16   GLUCOSE RANDOM mg/dL 124     Results from last 7 days   Lab Units 07/05/24  0613   INR  1.98*                   Lines/Drains:  Invasive Devices       Peripheral Intravenous Line  Duration             Peripheral IV 07/03/24 Proximal;Right;Ventral (anterior) Forearm 1 day              Drain  Duration             External Urinary Catheter <1 day                          Imaging:     Recent Cultures (last 7 days):         Last 24 Hours Medication List:   Current Facility-Administered Medications   Medication Dose Route Frequency Provider Last Rate    acetaminophen  650 mg Oral Q6H PRN May Allison Donohue MD      perphenazine  4 mg Oral Daily May Allison Donohue MD      And    amitriptyline  20 mg Oral Daily May Allison Donohue MD      atorvastatin  40 mg Oral QPM May Allison Donohue MD      calcium carbonate  1,000 mg Oral Daily PRN May Allison Donohue MD      cefazolin  2,000 mg Intravenous Q8H Farrah Suarez MD 2,000 mg (07/05/24 0504)    furosemide  20 mg Oral Daily Ree Courtney MD      levothyroxine  50  mcg Oral Early Morning May Allison Donohue MD      lisinopril  20 mg Oral Daily May Allison Donohue MD      metoprolol tartrate  25 mg Oral Q12H [facility] Farrah Suarez MD      nystatin   Topical BID May Allison Donohue MD      ondansetron  4 mg Intravenous Q6H PRN May Allison Donohue MD      potassium chloride  10 mEq Oral Daily May Allison Donohue MD      triamcinolone   Topical BID Farrah Suarez MD      warfarin  4 mg Oral Once per day on Monday Tuesday Wednesday Thursday Friday May Allison Donohue MD          Today, Patient Was Seen By: Ree Courtney MD    **Please Note: This note may have been constructed using a voice recognition system.**

## 2024-07-05 NOTE — NURSING NOTE
"Patient was sitting in recliner w/ feet elevated and alarm activated. Patient had bedside table on her left side with call bell & all personal belongings. Patient reported she \"was trying to go anywhere\" and climbed over the foot of the chair and fell on her bottom. Pt was found sitting on her butt by another nurse (Nella) and nurse educator (Lani). This RN was notified by Ana that patient fell. Upon entering room, this  RN assessed patient and determined no spinal precautions were needed as patient did not report any pain, did not hit her head and did not have loss of consciousness. This RN, Nella and Lani helped patient to stand and sit at bedside w/ assistance. Pt did not endorse any pain. Neuro assessment was unchanged since this morning's assessment, see flowsheets. Provider, Dr. Courtney, was notified and patient was assisted to the chair w/ alarms activated. Rounding w/ Dr. Courtney performed at bedside. Instructed to continue monitoring patient and reach out with any changes, but no medical intervention needed at this time. Pt educated to call for assistance when she wants to get out of the bed or chair. Pt given call bell, alarms activated and all personal belongings in reach. Will continue with fall precautions and interventions. Pt's sister, Mariam, was updated about fall and patient's status.   "

## 2024-07-06 PROBLEM — E83.52 HYPERCALCEMIA: Status: RESOLVED | Noted: 2024-07-03 | Resolved: 2024-07-06

## 2024-07-06 PROBLEM — R17 TOTAL BILIRUBIN, ELEVATED: Status: RESOLVED | Noted: 2024-07-03 | Resolved: 2024-07-06

## 2024-07-06 LAB
ANION GAP SERPL CALCULATED.3IONS-SCNC: 10 MMOL/L (ref 4–13)
BASOPHILS # BLD AUTO: 0.08 THOUSANDS/ÂΜL (ref 0–0.1)
BASOPHILS NFR BLD AUTO: 1 % (ref 0–1)
BUN SERPL-MCNC: 16 MG/DL (ref 5–25)
CALCIUM SERPL-MCNC: 9.5 MG/DL (ref 8.4–10.2)
CHLORIDE SERPL-SCNC: 104 MMOL/L (ref 96–108)
CO2 SERPL-SCNC: 25 MMOL/L (ref 21–32)
CREAT SERPL-MCNC: 0.83 MG/DL (ref 0.6–1.3)
EOSINOPHIL # BLD AUTO: 0.3 THOUSAND/ÂΜL (ref 0–0.61)
EOSINOPHIL NFR BLD AUTO: 3 % (ref 0–6)
ERYTHROCYTE [DISTWIDTH] IN BLOOD BY AUTOMATED COUNT: 15.4 % (ref 11.6–15.1)
GFR SERPL CREATININE-BSD FRML MDRD: 65 ML/MIN/1.73SQ M
GLUCOSE SERPL-MCNC: 116 MG/DL (ref 65–140)
HCT VFR BLD AUTO: 42.1 % (ref 34.8–46.1)
HGB BLD-MCNC: 13.4 G/DL (ref 11.5–15.4)
IMM GRANULOCYTES # BLD AUTO: 0.03 THOUSAND/UL (ref 0–0.2)
IMM GRANULOCYTES NFR BLD AUTO: 0 % (ref 0–2)
INR PPP: 1.81 (ref 0.84–1.19)
LYMPHOCYTES # BLD AUTO: 1.16 THOUSANDS/ÂΜL (ref 0.6–4.47)
LYMPHOCYTES NFR BLD AUTO: 13 % (ref 14–44)
MCH RBC QN AUTO: 29.2 PG (ref 26.8–34.3)
MCHC RBC AUTO-ENTMCNC: 31.8 G/DL (ref 31.4–37.4)
MCV RBC AUTO: 92 FL (ref 82–98)
MONOCYTES # BLD AUTO: 1.21 THOUSAND/ÂΜL (ref 0.17–1.22)
MONOCYTES NFR BLD AUTO: 13 % (ref 4–12)
NEUTROPHILS # BLD AUTO: 6.43 THOUSANDS/ÂΜL (ref 1.85–7.62)
NEUTS SEG NFR BLD AUTO: 70 % (ref 43–75)
NRBC BLD AUTO-RTO: 0 /100 WBCS
PLATELET # BLD AUTO: 203 THOUSANDS/UL (ref 149–390)
PMV BLD AUTO: 11.5 FL (ref 8.9–12.7)
POTASSIUM SERPL-SCNC: 3.8 MMOL/L (ref 3.5–5.3)
PROTHROMBIN TIME: 21.7 SECONDS (ref 11.6–14.5)
RBC # BLD AUTO: 4.59 MILLION/UL (ref 3.81–5.12)
SODIUM SERPL-SCNC: 139 MMOL/L (ref 135–147)
WBC # BLD AUTO: 9.21 THOUSAND/UL (ref 4.31–10.16)

## 2024-07-06 PROCEDURE — 85610 PROTHROMBIN TIME: CPT

## 2024-07-06 PROCEDURE — 92523 SPEECH SOUND LANG COMPREHEN: CPT

## 2024-07-06 PROCEDURE — 80048 BASIC METABOLIC PNL TOTAL CA: CPT

## 2024-07-06 PROCEDURE — 85025 COMPLETE CBC W/AUTO DIFF WBC: CPT

## 2024-07-06 PROCEDURE — 99233 SBSQ HOSP IP/OBS HIGH 50: CPT | Performed by: INTERNAL MEDICINE

## 2024-07-06 RX ORDER — FUROSEMIDE 20 MG/1
20 TABLET ORAL ONCE
Status: COMPLETED | OUTPATIENT
Start: 2024-07-06 | End: 2024-07-06

## 2024-07-06 RX ORDER — FUROSEMIDE 10 MG/ML
40 INJECTION INTRAMUSCULAR; INTRAVENOUS
Status: DISCONTINUED | OUTPATIENT
Start: 2024-07-06 | End: 2024-07-08

## 2024-07-06 RX ORDER — FUROSEMIDE 40 MG/1
40 TABLET ORAL DAILY
Status: DISCONTINUED | OUTPATIENT
Start: 2024-07-07 | End: 2024-07-06

## 2024-07-06 RX ORDER — AMOXICILLIN 250 MG
2 CAPSULE ORAL 2 TIMES DAILY
Status: DISCONTINUED | OUTPATIENT
Start: 2024-07-06 | End: 2024-07-07

## 2024-07-06 RX ORDER — WARFARIN SODIUM 4 MG/1
4 TABLET ORAL
Status: COMPLETED | OUTPATIENT
Start: 2024-07-06 | End: 2024-07-06

## 2024-07-06 RX ADMIN — NYSTATIN: 100000 POWDER TOPICAL at 17:53

## 2024-07-06 RX ADMIN — TRIAMCINOLONE ACETONIDE: 5 CREAM TOPICAL at 09:27

## 2024-07-06 RX ADMIN — TRIAMCINOLONE ACETONIDE: 5 CREAM TOPICAL at 17:53

## 2024-07-06 RX ADMIN — FUROSEMIDE 20 MG: 20 TABLET ORAL at 12:38

## 2024-07-06 RX ADMIN — AMITRIPTYLINE HYDROCHLORIDE 20 MG: 10 TABLET, FILM COATED ORAL at 09:24

## 2024-07-06 RX ADMIN — SENNOSIDES AND DOCUSATE SODIUM 2 TABLET: 50; 8.6 TABLET ORAL at 12:38

## 2024-07-06 RX ADMIN — POTASSIUM CHLORIDE 10 MEQ: 750 TABLET, EXTENDED RELEASE ORAL at 09:24

## 2024-07-06 RX ADMIN — CEFAZOLIN SODIUM 2000 MG: 2 SOLUTION INTRAVENOUS at 04:37

## 2024-07-06 RX ADMIN — FUROSEMIDE 20 MG: 20 TABLET ORAL at 09:27

## 2024-07-06 RX ADMIN — Medication 12.5 MG: at 21:01

## 2024-07-06 RX ADMIN — CEFAZOLIN SODIUM 2000 MG: 2 SOLUTION INTRAVENOUS at 12:38

## 2024-07-06 RX ADMIN — METOPROLOL TARTRATE 25 MG: 25 TABLET, FILM COATED ORAL at 09:24

## 2024-07-06 RX ADMIN — LISINOPRIL 20 MG: 20 TABLET ORAL at 09:24

## 2024-07-06 RX ADMIN — LEVOTHYROXINE SODIUM 50 MCG: 50 TABLET ORAL at 06:42

## 2024-07-06 RX ADMIN — PERPHENAZINE 4 MG: 4 TABLET, FILM COATED ORAL at 09:27

## 2024-07-06 RX ADMIN — ATORVASTATIN CALCIUM 40 MG: 40 TABLET, FILM COATED ORAL at 17:52

## 2024-07-06 RX ADMIN — NYSTATIN: 100000 POWDER TOPICAL at 09:27

## 2024-07-06 RX ADMIN — SENNOSIDES AND DOCUSATE SODIUM 2 TABLET: 50; 8.6 TABLET ORAL at 17:53

## 2024-07-06 RX ADMIN — CEFAZOLIN SODIUM 2000 MG: 2 SOLUTION INTRAVENOUS at 21:02

## 2024-07-06 RX ADMIN — FUROSEMIDE 40 MG: 10 INJECTION, SOLUTION INTRAMUSCULAR; INTRAVENOUS at 15:09

## 2024-07-06 RX ADMIN — WARFARIN SODIUM 4 MG: 4 TABLET ORAL at 17:52

## 2024-07-06 NOTE — ASSESSMENT & PLAN NOTE
Patient presented to [facility] ED on 7/2 for ankle rash.  Was prescribed Keflex times 3 times daily and discharged.  Patient not compliant with medication and return to [facility] for fall/dyspnea along with ongoing erythema, tenderness of bilateral lower extremities.    Plan  Start Ancef while in the hospital.  Reported penicillin allergy as a kid but will continue to monitor  Continue to monitor and maria d the area  If continues to progress we will consider dermatology consult

## 2024-07-06 NOTE — SPEECH THERAPY NOTE
Speech Language Pathology    Speech Language Pathology Cognitive Linguistic Evaluation      Patient Name: Nola Welch  Today's Date: 7/6/2024    Patient presents with moderate to severe cognitive linguistic impairments.  Reduced short term recall, attention, organization, problem solving, reasoning, and judgement skills are present.  Word retrieval difficulties were also noted.  Discussed with patient and RN.  Consider Neurology consult.       Problem List  Principal Problem:    Acute exacerbation of CHF (congestive heart failure) (HCC)  Active Problems:    Atrial fibrillation with RVR (HCC)    Hypothyroidism    HLD (hyperlipidemia)    Fall    Diarrhea    Hypercalcemia    Total bilirubin, elevated    Erysipelas of both lower extremities    Facial rash    Past Medical History  Past Medical History:   Diagnosis Date    Anxiety     Asthma     Atrial fibrillation (HCC)     Cardiac disease     CHF (congestive heart failure) (HCC)     Depression 5/22/2016    HLD (hyperlipidemia) 5/22/2016    HTN (hypertension), benign 5/22/2016    Hyperlipidemia     Hypertension     Hypothyroidism 5/22/2016    Stroke (HCC)      Past Surgical History  Past Surgical History:   Procedure Laterality Date    BREAST EXCISIONAL BIOPSY Right 2001    benign    TONSILLECTOMY      TOTAL ABDOMINAL HYSTERECTOMY Bilateral 1994    age 54    TOTAL ABDOMINAL HYSTERECTOMY W/ BILATERAL SALPINGOOPHORECTOMY Bilateral 1994    age 54      07/06/24 1758   Patient Information   Current Medical 84 year-old female with generalized weakness and confusion.  Patient stated that she lives alone and does not have any help at home, stated that she has been doing worse over the past week.  Stated that she has been having significant difficulties with memory.  Also stated she has been forgetting things frequently, and feels generally weak.    Special Studies CTH was negative   Past Medical History HTN, HLD, hypothyroid, and as outlined above.   Social History Patient  lives alone at home   Cognition   Overall Cognitive Status Impaired   Arousal/Participation Alert   Attention Attends with cues to redirect to subject   Orientation Level Oriented to person, place, and time   Memory Decreased long term memory, short term memory, recall of recent events   Following Commands Follows multi-step commands with increased time or repetition   Pain Assessment   Pain Assessment Tool 0-10   Pain Score No Pain   Speech/Swallow Mechanism Exam   Labial Strength WFL   Labial ROM WFL   Lingual Strength WFL   Lingual ROM WFL   Velum WFL   Mandible WFL   Dentition Adequate   Volitional Cough Strong   Vocal Quality WFL for age   Volitional Swallow WFL   Respiratory Status Nasal Cannula   Motor Speech Evaluation   Respiration/Phonation WFL   Vocal Quality WFL   Dysarthria No   Intelligibility Intelligible   Apraxia None present   Auditory Comprehension   Word Level Comprehension WFL   Yes/No Questions WFL   Commands WFL   Comprehends Conversation Simple   Interfering Components Attention - sustained;Attention to detail;Processing speed;Working memory   EffectiveTechniques Extra processing time; Repetition   Reading Comprehension   Reading Status Did not test   Expression   Primary Mode of Expression Verbal   Verbal Expression   Repetition Words;Phrases   Automatic Speech WFL   Naming WFL   Narrative Speech WFL   Sentence Level Impairment   Error Types Tangential   Pragmatics WFL   Interfering Components Attention;Impaired thought organization   Effective Techniques Decreased rate; Provide extra time; Word retrieval strategies   Written Expression   Dominant Hand Right   Cognitive/Language and Holistic Reasoning   Problem Solving X   Simple Functional Tasks Moderate   Complex Functional Tasks Minimal   Managing Finances Moderate   Managing Medications Moderate   Performing Discharge Planning Moderate   Verbal Reasoning Skills Moderate   Numeric Reasoning X   Simple Calculations Moderate   Complex  Calculations Maximal   Money Management Maximal   Time Moderate   Abstract Reasoning X   Abstract Thinking Not Consistent Moderate   Convergent Thinking Maximal   Divergent Thinking Maximal   Safety/Judgement X   Routine Tasks Maximal   Novel Situations Maximal   Complex Functional Tasks Maximal   Unable to Self-monitor and Self-correct Consistently Moderate   Insight Moderate insight   Flexibility of Thought Reduced flexibility   Planning Reduced planning skills   Organization Severely disorganized   Processing Speed Delayed   Perseveration Not present   Summary   Speech/Language Summary Patient presents with moderate to severe cognitive linguistic impairments   Communication   Comprehension (FIM) 5 - Understands basic directions and conversation   Expression (FIM) 4 - Expresses basic info/needs 75-90% of time   Cognition   Problem solving (FIM) 2 - Solves basic problems 25-49% of time   Memory (FIM) 2 - Recognizes, recalls/performs 25-49%

## 2024-07-06 NOTE — PLAN OF CARE
Problem: PAIN - ADULT  Goal: Verbalizes/displays adequate comfort level or baseline comfort level  Description: Interventions:  - Encourage patient to monitor pain and request assistance  - Assess pain using appropriate pain scale  - Administer analgesics based on type and severity of pain and evaluate response  - Implement non-pharmacological measures as appropriate and evaluate response  - Consider cultural and social influences on pain and pain management  - Notify physician/advanced practitioner if interventions unsuccessful or patient reports new pain  Outcome: Progressing     Problem: INFECTION - ADULT  Goal: Absence or prevention of progression during hospitalization  Description: INTERVENTIONS:  - Assess and monitor for signs and symptoms of infection  - Monitor lab/diagnostic results  - Monitor all insertion sites, i.e. indwelling lines, tubes, and drains  - Monitor endotracheal if appropriate and nasal secretions for changes in amount and color  - [place] appropriate cooling/warming therapies per order  - Administer medications as ordered  - Instruct and encourage patient and family to use good hand hygiene technique  - Identify and instruct in appropriate isolation precautions for identified infection/condition  Outcome: Progressing  Goal: Absence of fever/infection during neutropenic period  Description: INTERVENTIONS:  - Monitor WBC    Outcome: Progressing     Problem: SAFETY ADULT  Goal: Patient will remain free of falls  Description: INTERVENTIONS:  - Educate patient/family on patient safety including physical limitations  - Instruct patient to call for assistance with activity   - Consult OT/PT to assist with strengthening/mobility   - Keep Call bell within reach  - Keep bed low and locked with side rails adjusted as appropriate  - Keep care items and personal belongings within reach  - Initiate and maintain comfort rounds  - Make Fall Risk Sign visible to staff  - Offer Toileting every  Hours,  in advance of need  - Initiate/Maintain alarm  - Obtain necessary fall risk management equipment:   - Apply yellow socks and bracelet for high fall risk patients  - Consider moving patient to room near nurses station  Outcome: Progressing  Goal: Maintain or return to baseline ADL function  Description: INTERVENTIONS:  -  Assess patient's ability to carry out ADLs; assess patient's baseline for ADL function and identify physical deficits which impact ability to perform ADLs (bathing, care of mouth/teeth, toileting, grooming, dressing, etc.)  - Assess/evaluate cause of self-care deficits   - Assess range of motion  - Assess patient's mobility; develop plan if impaired  - Assess patient's need for assistive devices and provide as appropriate  - Encourage maximum independence but intervene and supervise when necessary  - Involve family in performance of ADLs  - Assess for home care needs following discharge   - Consider OT consult to assist with ADL evaluation and planning for discharge  - Provide patient education as appropriate  Outcome: Progressing  Goal: Maintains/Returns to pre admission functional level  Description: INTERVENTIONS:  - Perform AM-PAC 6 Click Basic Mobility/ Daily Activity assessment daily.  - Set and communicate daily mobility goal to care team and patient/family/caregiver.   - Collaborate with rehabilitation services on mobility goals if consulted  - Perform Range of Motion times a day.  - Reposition patient every  hours.  - Dangle patient  times a day  - Stand patient  times a day  - Ambulate patient  times a day  - Out of bed to chair times a day   - Out of bed for meals times a day  - Out of bed for toileting  - Record patient progress and toleration of activity level   Outcome: Progressing     Problem: DISCHARGE PLANNING  Goal: Discharge to home or other facility with appropriate resources  Description: INTERVENTIONS:  - Identify barriers to discharge w/patient and caregiver  - Arrange for  needed discharge resources and transportation as appropriate  - Identify discharge learning needs (meds, wound care, etc.)  - Arrange for interpretive services to assist at discharge as needed  - Refer to Case Management Department for coordinating discharge planning if the patient needs post-hospital services based on physician/advanced practitioner order or complex needs related to functional status, cognitive ability, or social support system  Outcome: Progressing     Problem: Knowledge Deficit  Goal: Patient/family/caregiver demonstrates understanding of disease process, treatment plan, medications, and discharge instructions  Description: Complete learning assessment and assess knowledge base.  Interventions:  - Provide teaching at level of understanding  - Provide teaching via preferred learning methods  Outcome: Progressing     Problem: Prexisting or High Potential for Compromised Skin Integrity  Goal: Skin integrity is maintained or improved  Description: INTERVENTIONS:  - Identify patients at risk for skin breakdown  - Assess and monitor skin integrity  - Assess and monitor nutrition and hydration status  - Monitor labs   - Assess for incontinence   - Turn and reposition patient  - Assist with mobility/ambulation  - Relieve pressure over bony prominences  - Avoid friction and shearing  - Provide appropriate hygiene as needed including keeping skin clean and dry  - Evaluate need for skin moisturizer/barrier cream  - Collaborate with interdisciplinary team   - Patient/family teaching  - Consider wound care consult   Outcome: Progressing     Problem: Nutrition/Hydration-ADULT  Goal: Nutrient/Hydration intake appropriate for improving, restoring or maintaining nutritional needs  Description: Monitor and assess patient's nutrition/hydration status for malnutrition. Collaborate with interdisciplinary team and initiate plan and interventions as ordered.  Monitor patient's weight and dietary intake as ordered or  per policy. Utilize nutrition screening tool and intervene as necessary. Determine patient's food preferences and provide high-protein, high-caloric foods as appropriate.     INTERVENTIONS:  - Monitor oral intake, urinary output, labs, and treatment plans  - Assess nutrition and hydration status and recommend course of action  - Evaluate amount of meals eaten  - Assist patient with eating if necessary   - Allow adequate time for meals  - Recommend/ encourage appropriate diets, oral nutritional supplements, and vitamin/mineral supplements  - Order, calculate, and assess calorie counts as needed  - Recommend, monitor, and adjust tube feedings and TPN/PPN based on assessed needs  - Assess need for intravenous fluids  - Provide specific nutrition/hydration education as appropriate  - Include patient/family/caregiver in decisions related to nutrition  Outcome: Progressing     Problem: Potential for Falls  Goal: Patient will remain free of falls  Description: INTERVENTIONS:  - Educate patient/family on patient safety including physical limitations  - Instruct patient to call for assistance with activity   - Consult OT/PT to assist with strengthening/mobility   - Keep Call bell within reach  - Keep bed low and locked with side rails adjusted as appropriate  - Keep care items and personal belongings within reach  - Initiate and maintain comfort rounds  - Make Fall Risk Sign visible to staff  - Offer Toileting every  Hours, in advance of need  - Initiate/Maintain alarm  - Obtain necessary fall risk management equipment:   - Apply yellow socks and bracelet for high fall risk patients  - Consider moving patient to room near nurses station  Outcome: Progressing

## 2024-07-06 NOTE — PLAN OF CARE
Problem: SLP ADULT - THOUGHT PROCESS, DISTURBED  Goal: Demonstrates cognitive skills at highest level of function for planned discharge setting.   See evaluation for individualized goals.  Description:  Patient will    1.  Increase cognitive linguistic functioning in order to perform ADLs with minimal assistance.      Outcome: Not Progressing     Problem: SLP ADULT - THOUGHT PROCESS, DISTURBED  Goal: Initial thought process eval performed  Outcome: Completed

## 2024-07-06 NOTE — PROGRESS NOTES
[facility]  Progress Note  Name: Nola Welch I  MRN: [id number]  Unit/Bed#: W -01 I Date of Admission: 7/3/2024   Date of Service: 7/6/2024 I Hospital Day: 3    Assessment & Plan   * Acute exacerbation of CHF (congestive heart failure) (HCC)  Assessment & Plan  Wt Readings from Last 3 Encounters:   07/06/24 85.6 kg (188 lb 11.4 oz)   04/02/23 83.2 kg (183 lb 6.8 oz)   08/27/21 85.3 kg (188 lb)     Lab Results   Component Value Date     (H) 07/03/2024     (H) 11/13/2023       Presents with dyspnea on exertion and lower extremity edema  Patient is currently volume overloaded.  Last echo in June 2023 showed LVEF 60 to 65% with normal systolic function. Indeterminate diastolic function due to atrial fibrillation.   Home meds GDMT: Diuretic: Oral Lasix 20 mg daily, B-Blocker: Metoprolol daily dose reduced to 25mg BID, ACE/ARB/ARNi: Lisinopril 20 mg daily      Plan:  Increase lasix to 40 mg IV BID  Continue metoprolol and lisinopril  Sodium restriction 2g  Monitor K and mg  HOB > 30°, Daily standing weights, Measure I/O  Consult nutrition for dietary education    Fall  Assessment & Plan  Patient reports that she let herself down at home this afternoon while sorting out garbage in the garage.  Denied head strike or injury.  Patient on Coumadin for A-fib.  The head was negative.  Admitted for placement issue.    Plans:  Fall precaution  Repeat orthostatic in the am  Case management consult  PT, OT for cognitive evaluation.    Erysipelas of both lower extremities  Assessment & Plan  Patient presented to [facility] ED on 7/2 for ankle rash.  Was prescribed Keflex times 3 times daily and discharged.  Patient not compliant with medication and return to [facility] for fall/dyspnea along with ongoing erythema, tenderness of bilateral lower extremities.    Plan  Start Ancef while in the hospital.  Reported penicillin allergy as a kid but will continue to monitor  Continue to  monitor and maria d the area  If continues to progress we will consider dermatology consult    Total bilirubin, elevated-resolved as of 7/6/2024  Assessment & Plan  Total bilirubin 1.49 on admission in the setting of poor intake.    7/4 resolved    Hypercalcemia-resolved as of 7/6/2024  Assessment & Plan  Calcium 10.4 on admission  In the setting of poor oral intake    Resolved    Diarrhea  Assessment & Plan  Patient reports watery diarrhea usually 3 times per day.  No bowel movement since admission.  Monitor symptoms  Start senna-docusate.    Atrial fibrillation with RVR (HCC)  Assessment & Plan  Patient reports A-fib on warfarin 4 mg Monday to Friday.      Lab Results   Component Value Date    INR 1.81 (H) 07/06/2024    INR 1.98 (H) 07/05/2024    INR 1.99 (H) 07/04/2024      Gave additional dose of Warfarin today, as she is subtherapeutic, monitor INR in the morning.    HLD (hyperlipidemia)  Assessment & Plan  Continue home med Lipitor    Hypothyroidism  Assessment & Plan  Continue home med levothyroxine           VTE Pharmacologic Prophylaxis: VTE Score: 6 High Risk (Score >/= 5) - Pharmacological DVT Prophylaxis Ordered: warfarin (Coumadin). Sequential Compression Devices Ordered.    Mobility:   Basic Mobility Inpatient Raw Score: 17  JH-HLM Goal: 5: Stand one or more mins  JH-HLM Achieved: 3: Sit at edge of bed  JH-HLM Goal achieved. Continue to encourage appropriate mobility.    Patient Centered Rounds: I performed bedside rounds with nursing staff today.  Discussions with Specialists or Other Care Team Provider: None    Education and Discussions with Family / Patient: Updated  (sister) via phone. Attending updated sister over the phone.    Current Length of Stay: 3 day(s)  Current Patient Status: Inpatient   Discharge Plan: Anticipate discharge in 48-72 hrs to home.    Code Status: Level 1 - Full Code    Subjective:   Patient endorses feeling well, she states she slept well and has no pain. She  states she would like to return home, upon discussion about her needing home support, she was onboard with getting an aid to help her at home but not interested in going to short term rehab.    Objective:     Vitals:   Temp (24hrs), Av.9 °F (36.6 °C), Min:97.7 °F (36.5 °C), Max:98.1 °F (36.7 °C)    Temp:  [97.7 °F (36.5 °C)-98.1 °F (36.7 °C)] 97.7 °F (36.5 °C)  HR:  [51-70] 54  Resp:  [24] 24  BP: (156-160)/(82-93) 156/82  SpO2:  [91 %-96 %] 96 %  Body mass index is 31.89 kg/m².     Input and Output Summary (last 24 hours):     Intake/Output Summary (Last 24 hours) at 2024 1702  Last data filed at 2024 1556  Gross per 24 hour   Intake 1290 ml   Output 1150 ml   Net 140 ml       Physical Exam:   Physical Exam  Vitals reviewed.   Constitutional:       Appearance: Normal appearance.   HENT:      Head: Normocephalic and atraumatic.      Mouth/Throat:      Mouth: Mucous membranes are moist.      Pharynx: Oropharynx is clear.   Eyes:      General: No scleral icterus.     Extraocular Movements: Extraocular movements intact.      Conjunctiva/sclera: Conjunctivae normal.   Cardiovascular:      Rate and Rhythm: Normal rate and regular rhythm.   Pulmonary:      Effort: Pulmonary effort is normal. No respiratory distress.      Breath sounds: Normal breath sounds. No stridor. No wheezing.   Abdominal:      General: Bowel sounds are normal. There is no distension.      Palpations: Abdomen is soft. There is no mass.      Tenderness: There is no abdominal tenderness.   Musculoskeletal:      Right lower leg: No edema.      Left lower leg: No edema.   Skin:     General: Skin is warm.      Findings: Rash (face, bilateral lower extremity) present.   Neurological:      Mental Status: She is alert and oriented to person, place, and time. Mental status is at baseline.      Cranial Nerves: No cranial nerve deficit.      Sensory: No sensory deficit.   Psychiatric:         Mood and Affect: Mood normal.          Additional Data:      Labs:  Results from last 7 days   Lab Units 07/06/24  0606   WBC Thousand/uL 9.21   HEMOGLOBIN g/dL 13.4   HEMATOCRIT % 42.1   PLATELETS Thousands/uL 203   SEGS PCT % 70   LYMPHO PCT % 13*   MONO PCT % 13*   EOS PCT % 3     Results from last 7 days   Lab Units 07/06/24  0606 07/05/24  0613   SODIUM mmol/L 139 137   POTASSIUM mmol/L 3.8 3.6   CHLORIDE mmol/L 104 103   CO2 mmol/L 25 25   BUN mg/dL 16 18   CREATININE mg/dL 0.83 0.89   ANION GAP mmol/L 10 9   CALCIUM mg/dL 9.5 9.5   ALBUMIN g/dL  --  3.5   TOTAL BILIRUBIN mg/dL  --  0.82   ALK PHOS U/L  --  65   ALT U/L  --  9   AST U/L  --  16   GLUCOSE RANDOM mg/dL 116 124     Results from last 7 days   Lab Units 07/06/24  0717   INR  1.81*                   Lines/Drains:  Invasive Devices       Peripheral Intravenous Line  Duration             Peripheral IV 07/03/24 Proximal;Right;Ventral (anterior) Forearm 3 days              Drain  Duration             External Urinary Catheter 1 day                    Imaging: Reviewed radiology reports from this admission including: chest xray    Recent Cultures (last 7 days):         Last 24 Hours Medication List:   Current Facility-Administered Medications   Medication Dose Route Frequency Provider Last Rate    acetaminophen  650 mg Oral Q6H PRN May Allison Donohue MD      perphenazine  4 mg Oral Daily May Allison Donohue MD      And    amitriptyline  20 mg Oral Daily May Allison Donohue MD      atorvastatin  40 mg Oral QPM May Allison Donohue MD      calcium carbonate  1,000 mg Oral Daily PRN May Allison Donohue MD      cefazolin  2,000 mg Intravenous Q8H Farrah Suarez MD 2,000 mg (07/06/24 1238)    furosemide  40 mg Intravenous BID (diuretic) Farrah Suarez MD      levothyroxine  50 mcg Oral Early Morning May Allison Donohue MD      lisinopril  20 mg Oral Daily May Allison Donohue MD      metoprolol tartrate  12.5 mg Oral Q12H [facility] Farrah Suarez MD      nystatin   Topical BID May Allison Donohue MD      ondansetron  4 mg Intravenous Q6H  PRN May Allison Donohue MD      potassium chloride  10 mEq Oral Daily May Allison Donohue MD      senna-docusate sodium  2 tablet Oral BID Toñito Moise MD      triamcinolone   Topical BID Farrah Suarez MD      warfarin  4 mg Oral Once per day on Monday Tuesday Wednesday Thursday Friday May Allison Donohue MD      warfarin  4 mg Oral Once (warfarin) Toñito Moise MD          Today, Patient Was Seen By: Toñito Moise MD    **Please Note: This note may have been constructed using a voice recognition system.**

## 2024-07-06 NOTE — ASSESSMENT & PLAN NOTE
Patient reports that she let herself down at home this afternoon while sorting out garbage in the garage.  Denied head strike or injury.  Patient on Coumadin for A-fib.  The head was negative.  Admitted for placement issue.    Plans:  Fall precaution  Repeat orthostatic in the am  Case management consult  PT, OT for cognitive evaluation.

## 2024-07-06 NOTE — ASSESSMENT & PLAN NOTE
Wt Readings from Last 3 Encounters:   07/06/24 85.6 kg (188 lb 11.4 oz)   04/02/23 83.2 kg (183 lb 6.8 oz)   08/27/21 85.3 kg (188 lb)     Lab Results   Component Value Date     (H) 07/03/2024     (H) 11/13/2023       Presents with dyspnea on exertion and lower extremity edema  Patient is currently volume overloaded.  Last echo in June 2023 showed LVEF 60 to 65% with normal systolic function. Indeterminate diastolic function due to atrial fibrillation.   Home meds GDMT: Diuretic: Oral Lasix 20 mg daily, B-Blocker: Metoprolol daily dose reduced to 25mg BID, ACE/ARB/ARNi: Lisinopril 20 mg daily      Plan:  Increase lasix to 40 mg IV BID  Continue metoprolol and lisinopril  Sodium restriction 2g  Monitor K and mg  HOB > 30°, Daily standing weights, Measure I/O  Consult nutrition for dietary education

## 2024-07-06 NOTE — ASSESSMENT & PLAN NOTE
Patient reports watery diarrhea usually 3 times per day.  No bowel movement since admission.  Monitor symptoms  Start senna-docusate.

## 2024-07-06 NOTE — ASSESSMENT & PLAN NOTE
Patient reports A-fib on warfarin 4 mg Monday to Friday.      Lab Results   Component Value Date    INR 1.81 (H) 07/06/2024    INR 1.98 (H) 07/05/2024    INR 1.99 (H) 07/04/2024      Gave additional dose of Warfarin today, as she is subtherapeutic, monitor INR in the morning.

## 2024-07-07 LAB
ANION GAP SERPL CALCULATED.3IONS-SCNC: 10 MMOL/L (ref 4–13)
BUN SERPL-MCNC: 15 MG/DL (ref 5–25)
CALCIUM SERPL-MCNC: 9.7 MG/DL (ref 8.4–10.2)
CHLORIDE SERPL-SCNC: 100 MMOL/L (ref 96–108)
CO2 SERPL-SCNC: 30 MMOL/L (ref 21–32)
CREAT SERPL-MCNC: 0.95 MG/DL (ref 0.6–1.3)
GFR SERPL CREATININE-BSD FRML MDRD: 55 ML/MIN/1.73SQ M
GLUCOSE SERPL-MCNC: 116 MG/DL (ref 65–140)
INR PPP: 1.52 (ref 0.84–1.19)
MAGNESIUM SERPL-MCNC: 1.8 MG/DL (ref 1.9–2.7)
POTASSIUM SERPL-SCNC: 3.4 MMOL/L (ref 3.5–5.3)
PROTHROMBIN TIME: 19 SECONDS (ref 11.6–14.5)
SODIUM SERPL-SCNC: 140 MMOL/L (ref 135–147)

## 2024-07-07 PROCEDURE — 80048 BASIC METABOLIC PNL TOTAL CA: CPT

## 2024-07-07 PROCEDURE — 83735 ASSAY OF MAGNESIUM: CPT

## 2024-07-07 PROCEDURE — 99233 SBSQ HOSP IP/OBS HIGH 50: CPT | Performed by: INTERNAL MEDICINE

## 2024-07-07 PROCEDURE — 85610 PROTHROMBIN TIME: CPT

## 2024-07-07 PROCEDURE — 97163 PT EVAL HIGH COMPLEX 45 MIN: CPT

## 2024-07-07 RX ORDER — CEPHALEXIN 500 MG/1
500 CAPSULE ORAL EVERY 6 HOURS SCHEDULED
Status: CANCELLED | OUTPATIENT
Start: 2024-07-08 | End: 2024-07-13

## 2024-07-07 RX ORDER — MAGNESIUM SULFATE HEPTAHYDRATE 40 MG/ML
2 INJECTION, SOLUTION INTRAVENOUS ONCE
Status: COMPLETED | OUTPATIENT
Start: 2024-07-07 | End: 2024-07-07

## 2024-07-07 RX ORDER — POTASSIUM CHLORIDE 750 MG/1
10 TABLET, EXTENDED RELEASE ORAL ONCE
Status: COMPLETED | OUTPATIENT
Start: 2024-07-07 | End: 2024-07-07

## 2024-07-07 RX ORDER — WARFARIN SODIUM 4 MG/1
4 TABLET ORAL
Status: COMPLETED | OUTPATIENT
Start: 2024-07-07 | End: 2024-07-07

## 2024-07-07 RX ADMIN — LEVOTHYROXINE SODIUM 50 MCG: 50 TABLET ORAL at 05:16

## 2024-07-07 RX ADMIN — ATORVASTATIN CALCIUM 40 MG: 40 TABLET, FILM COATED ORAL at 17:34

## 2024-07-07 RX ADMIN — CEFAZOLIN SODIUM 2000 MG: 2 SOLUTION INTRAVENOUS at 20:44

## 2024-07-07 RX ADMIN — LISINOPRIL 20 MG: 20 TABLET ORAL at 08:08

## 2024-07-07 RX ADMIN — FUROSEMIDE 40 MG: 10 INJECTION, SOLUTION INTRAMUSCULAR; INTRAVENOUS at 08:12

## 2024-07-07 RX ADMIN — Medication 12.5 MG: at 20:44

## 2024-07-07 RX ADMIN — TRIAMCINOLONE ACETONIDE: 5 CREAM TOPICAL at 08:09

## 2024-07-07 RX ADMIN — POTASSIUM CHLORIDE 10 MEQ: 750 TABLET, EXTENDED RELEASE ORAL at 08:13

## 2024-07-07 RX ADMIN — WARFARIN SODIUM 4 MG: 4 TABLET ORAL at 17:37

## 2024-07-07 RX ADMIN — POTASSIUM CHLORIDE 10 MEQ: 750 TABLET, EXTENDED RELEASE ORAL at 08:12

## 2024-07-07 RX ADMIN — TRIAMCINOLONE ACETONIDE: 5 CREAM TOPICAL at 17:36

## 2024-07-07 RX ADMIN — Medication 12.5 MG: at 08:08

## 2024-07-07 RX ADMIN — NYSTATIN: 100000 POWDER TOPICAL at 17:37

## 2024-07-07 RX ADMIN — AMITRIPTYLINE HYDROCHLORIDE 20 MG: 10 TABLET, FILM COATED ORAL at 08:08

## 2024-07-07 RX ADMIN — CEFAZOLIN SODIUM 2000 MG: 2 SOLUTION INTRAVENOUS at 04:52

## 2024-07-07 RX ADMIN — FUROSEMIDE 40 MG: 10 INJECTION, SOLUTION INTRAMUSCULAR; INTRAVENOUS at 16:31

## 2024-07-07 RX ADMIN — MAGNESIUM SULFATE HEPTAHYDRATE 2 G: 40 INJECTION, SOLUTION INTRAVENOUS at 08:09

## 2024-07-07 RX ADMIN — NYSTATIN: 100000 POWDER TOPICAL at 08:09

## 2024-07-07 RX ADMIN — CEFAZOLIN SODIUM 2000 MG: 2 SOLUTION INTRAVENOUS at 13:00

## 2024-07-07 RX ADMIN — PERPHENAZINE 4 MG: 4 TABLET, FILM COATED ORAL at 08:09

## 2024-07-07 NOTE — ASSESSMENT & PLAN NOTE
Patient presented to [facility] ED on 7/2 for ankle rash.  Was prescribed Keflex times 3 times daily and discharged.  Patient not compliant with medication and return to [facility] for fall/dyspnea along with ongoing erythema, tenderness of bilateral lower extremities.  Significant improvement in erythema around face and bilateral legs.    Plan  Start Ancef while in the hospital.  Reported penicillin allergy as a kid but will continue to monitor  Today is IV Ancef day 5/5   I have personally evaluated and examined the patient. The Attending was available to me as a supervising provider if needed.

## 2024-07-07 NOTE — PLAN OF CARE
Problem: PAIN - ADULT  Goal: Verbalizes/displays adequate comfort level or baseline comfort level  Description: Interventions:  - Encourage patient to monitor pain and request assistance  - Assess pain using appropriate pain scale  - Administer analgesics based on type and severity of pain and evaluate response  - Implement non-pharmacological measures as appropriate and evaluate response  - Consider cultural and social influences on pain and pain management  - Notify physician/advanced practitioner if interventions unsuccessful or patient reports new pain  Outcome: Progressing     Problem: INFECTION - ADULT  Goal: Absence or prevention of progression during hospitalization  Description: INTERVENTIONS:  - Assess and monitor for signs and symptoms of infection  - Monitor lab/diagnostic results  - Monitor all insertion sites, i.e. indwelling lines, tubes, and drains  - Monitor endotracheal if appropriate and nasal secretions for changes in amount and color  - [place] appropriate cooling/warming therapies per order  - Administer medications as ordered  - Instruct and encourage patient and family to use good hand hygiene technique  - Identify and instruct in appropriate isolation precautions for identified infection/condition  Outcome: Progressing  Goal: Absence of fever/infection during neutropenic period  Description: INTERVENTIONS:  - Monitor WBC    Outcome: Progressing     Problem: SAFETY ADULT  Goal: Patient will remain free of falls  Description: INTERVENTIONS:  - Educate patient/family on patient safety including physical limitations  - Instruct patient to call for assistance with activity   - Consult OT/PT to assist with strengthening/mobility   - Keep Call bell within reach  - Keep bed low and locked with side rails adjusted as appropriate  - Keep care items and personal belongings within reach  - Initiate and maintain comfort rounds  - Make Fall Risk Sign visible to staff  - Offer Toileting every  Hours,  in advance of need  - Initiate/Maintain alarm  - Obtain necessary fall risk management equipment:   - Apply yellow socks and bracelet for high fall risk patients  - Consider moving patient to room near nurses station  Outcome: Progressing  Goal: Maintain or return to baseline ADL function  Description: INTERVENTIONS:  -  Assess patient's ability to carry out ADLs; assess patient's baseline for ADL function and identify physical deficits which impact ability to perform ADLs (bathing, care of mouth/teeth, toileting, grooming, dressing, etc.)  - Assess/evaluate cause of self-care deficits   - Assess range of motion  - Assess patient's mobility; develop plan if impaired  - Assess patient's need for assistive devices and provide as appropriate  - Encourage maximum independence but intervene and supervise when necessary  - Involve family in performance of ADLs  - Assess for home care needs following discharge   - Consider OT consult to assist with ADL evaluation and planning for discharge  - Provide patient education as appropriate  Outcome: Progressing  Goal: Maintains/Returns to pre admission functional level  Description: INTERVENTIONS:  - Perform AM-PAC 6 Click Basic Mobility/ Daily Activity assessment daily.  - Set and communicate daily mobility goal to care team and patient/family/caregiver.   - Collaborate with rehabilitation services on mobility goals if consulted  - Perform Range of Motion  times a day.  - Reposition patient every  hours.  - Dangle patient  times a day  - Stand patient  times a day  - Ambulate patient  times a day  - Out of bed to chair  times a day   - Out of bed for meals  times a day  - Out of bed for toileting  - Record patient progress and toleration of activity level   Outcome: Progressing

## 2024-07-07 NOTE — ASSESSMENT & PLAN NOTE
Wt Readings from Last 3 Encounters:   07/06/24 85.6 kg (188 lb 11.4 oz)   04/02/23 83.2 kg (183 lb 6.8 oz)   08/27/21 85.3 kg (188 lb)     Lab Results   Component Value Date     (H) 07/03/2024     (H) 11/13/2023       Presents with dyspnea on exertion and lower extremity edema  Patient is currently volume overloaded.  Last echo in June 2023 showed LVEF 60 to 65% with normal systolic function. Indeterminate diastolic function due to atrial fibrillation.   Home meds GDMT: Diuretic: Oral Lasix 20 mg daily, B-Blocker: Metoprolol daily dose reduced to 12.5 mg BID, ACE/ARB/ARNi: Lisinopril 20 mg daily  Currently requiring 2L of oxygen via nasal cannula. Not on any home oxygen.   Likely in the setting of volume overload.    Plan:  Switch to PO Lasix 40 mg starting tomorrow  Echocardiogram to assess for worsening diastolic dysfunction from prior echocardiogram given persistently elevated blood pressures  Increase to Lopressor 25 mg BID for hypertension.  Now has improved heart rates  If blood pressures remain elevated, consider increasing lisinopril dose or adding spironolactone

## 2024-07-07 NOTE — PROGRESS NOTES
formerly [facility]  Progress Note  Name: Nola Welch I  MRN: [id number]  Unit/Bed#: W -01 I Date of Admission: 7/3/2024   Date of Service: 7/7/2024 I Hospital Day: 4    Assessment & Plan   * Acute exacerbation of CHF (congestive heart failure) (HCC)  Assessment & Plan  Wt Readings from Last 3 Encounters:   07/06/24 85.6 kg (188 lb 11.4 oz)   04/02/23 83.2 kg (183 lb 6.8 oz)   08/27/21 85.3 kg (188 lb)     Lab Results   Component Value Date     (H) 07/03/2024     (H) 11/13/2023       Presents with dyspnea on exertion and lower extremity edema  Patient is currently volume overloaded.  Last echo in June 2023 showed LVEF 60 to 65% with normal systolic function. Indeterminate diastolic function due to atrial fibrillation.   Home meds GDMT: Diuretic: Oral Lasix 20 mg daily, B-Blocker: Metoprolol daily dose reduced to 25mg BID, ACE/ARB/ARNi: Lisinopril 20 mg daily  Currently requiring 2L of oxygen via nasal cannula. Not on any home oxygen.   Likely in the setting of volume overload.    Plan:  Continue lasix to 40 mg IV BID.  On discharge increase home dose lasix to 40 mg daily  Continue metoprolol and lisinopril  Sodium restriction 2g  Monitor K and mg  HOB > 30°, Daily standing weights, Measure I/O  Consult nutrition for dietary education  Home oxygen evaluation tomorrow if still requiring oxygen    Erysipelas of both lower extremities  Assessment & Plan  Patient presented to [facility] ED on 7/2 for ankle rash.  Was prescribed Keflex times 3 times daily and discharged.  Patient not compliant with medication and return to [facility] for fall/dyspnea along with ongoing erythema, tenderness of bilateral lower extremities.  Significant improvement in erythema around face and bilateral legs.    Plan  Start Ancef while in the hospital.  Reported penicillin allergy as a kid but will continue to monitor  Completing 5 days of ancef on Monday 7/8.  Continue to monitor and maria d the  area  If continues to progress we will consider dermatology consult    Diarrhea  Assessment & Plan  Patient reported chronic watery diarrhea usually 3 times per day.  She had 1 loose stool overnight.   Monitor symptoms  DC'd senna-docusate.    Fall  Assessment & Plan  Patient reports that she let herself down at home this afternoon while sorting out garbage in the garage.  Denied head strike or injury.  Patient on Coumadin for A-fib.  The head was negative.  Admitted for placement issue.    Plans:  Fall precaution  Repeat orthostatic in the am  Case management consult  PT, OT for cognitive evaluation.    HLD (hyperlipidemia)  Assessment & Plan  Continue home med Lipitor    Hypothyroidism  Assessment & Plan  Continue home med levothyroxine    Atrial fibrillation with RVR (HCC)  Assessment & Plan  Patient reports A-fib on warfarin 4 mg Monday to Friday.    Lab Results   Component Value Date    INR 1.52 (H) 07/07/2024    INR 1.81 (H) 07/06/2024    INR 1.98 (H) 07/05/2024      Gave additional dose of Warfarin on Saturday and sunday, as she is subtherapeutic.  Monitor INR in the morning.    Total bilirubin, elevated-resolved as of 7/6/2024  Assessment & Plan  Total bilirubin 1.49 on admission in the setting of poor intake.    7/4 resolved    Hypercalcemia-resolved as of 7/6/2024  Assessment & Plan  Calcium 10.4 on admission  In the setting of poor oral intake    Resolved           VTE Pharmacologic Prophylaxis: VTE Score: 6 High Risk (Score >/= 5) - Pharmacological DVT Prophylaxis Ordered: warfarin (Coumadin). Sequential Compression Devices Ordered.    Mobility:   Basic Mobility Inpatient Raw Score: 18  JH-HLM Goal: 6: Walk 10 steps or more  JH-HLM Achieved: 7: Walk 25 feet or more  JH-HLM Goal achieved. Continue to encourage appropriate mobility.    Patient Centered Rounds: I performed bedside rounds with nursing staff today.  Discussions with Specialists or Other Care Team Provider: None    Education and Discussions  with Family / Patient: Updated  (sister) via phone. Attending updated sister over the phone.    Current Length of Stay: 4 day(s)  Current Patient Status: Inpatient   Discharge Plan: Anticipate discharge in 48-72 hrs to home.    Code Status: Level 1 - Full Code    Subjective:   No new complaints at this time.  She is agreeable to going to rehab for 2 weeks if she needs to.  Otherwise denies any chest pain, difficulty breathing, dysuria, nausea, vomiting.    Objective:     Vitals:   Temp (24hrs), Av.4 °F (36.3 °C), Min:97 °F (36.1 °C), Max:97.7 °F (36.5 °C)    Temp:  [97 °F (36.1 °C)-97.7 °F (36.5 °C)] 97 °F (36.1 °C)  HR:  [54-74] 74  Resp:  [16] 16  BP: (152-179)/() 179/108  SpO2:  [92 %-96 %] 92 %  Body mass index is 31.89 kg/m².     Input and Output Summary (last 24 hours):     Intake/Output Summary (Last 24 hours) at 2024 1504  Last data filed at 2024 1500  Gross per 24 hour   Intake 1130 ml   Output 1775 ml   Net -645 ml       Physical Exam:   Physical Exam  Vitals reviewed.   Constitutional:       Appearance: Normal appearance.   HENT:      Head: Normocephalic and atraumatic.      Mouth/Throat:      Mouth: Mucous membranes are moist.      Pharynx: Oropharynx is clear.   Eyes:      General: No scleral icterus.     Extraocular Movements: Extraocular movements intact.      Conjunctiva/sclera: Conjunctivae normal.   Cardiovascular:      Rate and Rhythm: Normal rate and regular rhythm.   Pulmonary:      Effort: Pulmonary effort is normal. No respiratory distress.      Breath sounds: Normal breath sounds. No stridor. No wheezing.   Abdominal:      General: Bowel sounds are normal. There is no distension.      Palpations: Abdomen is soft. There is no mass.      Tenderness: There is no abdominal tenderness.   Musculoskeletal:      Right lower leg: No edema.      Left lower leg: No edema.   Skin:     General: Skin is warm.      Findings: Rash (face, bilateral lower extremity) present.    Neurological:      Mental Status: She is alert and oriented to person, place, and time. Mental status is at baseline.      Cranial Nerves: No cranial nerve deficit.      Sensory: No sensory deficit.   Psychiatric:         Mood and Affect: Mood normal.          Additional Data:     Labs:  Results from last 7 days   Lab Units 07/06/24  0606   WBC Thousand/uL 9.21   HEMOGLOBIN g/dL 13.4   HEMATOCRIT % 42.1   PLATELETS Thousands/uL 203   SEGS PCT % 70   LYMPHO PCT % 13*   MONO PCT % 13*   EOS PCT % 3     Results from last 7 days   Lab Units 07/07/24  0552 07/06/24  0606 07/05/24  0613   SODIUM mmol/L 140   < > 137   POTASSIUM mmol/L 3.4*   < > 3.6   CHLORIDE mmol/L 100   < > 103   CO2 mmol/L 30   < > 25   BUN mg/dL 15   < > 18   CREATININE mg/dL 0.95   < > 0.89   ANION GAP mmol/L 10   < > 9   CALCIUM mg/dL 9.7   < > 9.5   ALBUMIN g/dL  --   --  3.5   TOTAL BILIRUBIN mg/dL  --   --  0.82   ALK PHOS U/L  --   --  65   ALT U/L  --   --  9   AST U/L  --   --  16   GLUCOSE RANDOM mg/dL 116   < > 124    < > = values in this interval not displayed.     Results from last 7 days   Lab Units 07/07/24  0552   INR  1.52*                   Lines/Drains:  Invasive Devices       Peripheral Intravenous Line  Duration             Peripheral IV 07/07/24 Right Antecubital <1 day              Drain  Duration             External Urinary Catheter 2 days                    Imaging: Reviewed radiology reports from this admission including: chest xray    Recent Cultures (last 7 days):         Last 24 Hours Medication List:   Current Facility-Administered Medications   Medication Dose Route Frequency Provider Last Rate    acetaminophen  650 mg Oral Q6H PRN May Allison Donohue MD      perphenazine  4 mg Oral Daily May Allison Donohue MD      And    amitriptyline  20 mg Oral Daily May Allison Donohue MD      atorvastatin  40 mg Oral QPM May Allison Donohue MD      calcium carbonate  1,000 mg Oral Daily PRN May Allison Donohue MD      cefazolin  2,000 mg Intravenous Q8H Reem  MD Ethan 2,000 mg (07/07/24 1300)    furosemide  40 mg Intravenous BID (diuretic) Farrah Suarez MD      levothyroxine  50 mcg Oral Early Morning May Allison Donohue MD      lisinopril  20 mg Oral Daily May Allison Donohue MD      metoprolol tartrate  12.5 mg Oral Q12H HYUN Farrah Suarez MD      nystatin   Topical BID May Allison Donohue MD      ondansetron  4 mg Intravenous Q6H PRN May Allison Donohue MD      potassium chloride  10 mEq Oral Daily May Allison Donohue MD      triamcinolone   Topical BID Farrah Suarez MD      warfarin  4 mg Oral Once per day on Monday Tuesday Wednesday Thursday Friday May Allison Donohue MD      warfarin  4 mg Oral Once (warfarin) Freddy Long MD          Today, Patient Was Seen By: Freddy Long MD    **Please Note: This note may have been constructed using a voice recognition system.**

## 2024-07-07 NOTE — PLAN OF CARE
Problem: PHYSICAL THERAPY ADULT  Goal: Performs mobility at highest level of function for planned discharge setting.  See evaluation for individualized goals.  Description: Treatment/Interventions: ADL retraining, Functional transfer training, LE strengthening/ROM, Elevations, Therapeutic exercise, Endurance training, Patient/family training, Gait training, Family, Spoke to nursing, Spoke to case management, Equipment eval/education, Bed mobility  Equipment Recommended: Walker       See flowsheet documentation for full assessment, interventions and recommendations.  Note: Prognosis: Fair  Problem List: Obesity, Decreased safety awareness, Impaired judgement, Decreased mobility, Impaired balance, Decreased endurance, Decreased strength  Assessment: Pt is a 85 yo female admitted to [facility] on 7/3/2024 with dx of acute exacerbation of CHF. Pt PMH significant for Afib with RVR, HTN, depression, acute respiratory failure with hypoxia, CHF and falls. PT consulted with order for PT eval and treat. Prior to admission, pt was independent with all IADLs, ADLs and functional mobility. Pt utilizes a cane only when ambulating within the community. Upon evaluation, pt performs all bed mobility with supervision and all transfers with RW and supervision due to impulsive tendencies and decreased balance. Pt ambulates with RW and close supervision demonstrating deficits in balance and endurance. Pt performs all functional mobility and ambulation with impulsive tendencies, decreased safety awareness and judgement, placing pt at an increased risk for falls. Given impairments discussed, current medical treatment and PMH, pt status is currently unstable/unpredictable. Given this, pt is not currently functioning at baseline and would benefit from skill PT services in order to address aforementioned impairments and decrease risk for falls. PT recommendation is level II discharge disposition.        Rehab Resource Intensity Level, PT: II  (Moderate Resource Intensity)    See flowsheet documentation for full assessment.

## 2024-07-07 NOTE — ASSESSMENT & PLAN NOTE
Patient reports that she let herself down at home this afternoon while sorting out garbage in the garage.  Denied head strike or injury.  Patient on Coumadin for A-fib.  CT head was negative.  Admitted for placement issue.    Plans:  Fall precautions  PT recommended Level II  Geriatrics consulted, appreciate recommendations

## 2024-07-07 NOTE — ASSESSMENT & PLAN NOTE
Patient reported chronic watery diarrhea usually 3 times per day.  She had 1 loose stool overnight.   Monitor symptoms  DC'd senna-docusate.

## 2024-07-07 NOTE — ASSESSMENT & PLAN NOTE
Patient reports A-fib on warfarin 4 mg Monday to Friday.    Lab Results   Component Value Date    INR 1.52 (H) 07/07/2024    INR 1.81 (H) 07/06/2024    INR 1.98 (H) 07/05/2024     Gave additional dose of Warfarin on Saturday and sunday, as she is subtherapeutic.  Patient continues to be subtherapeutic  Check INR daily. Expect to see effect of extra Warfarin 8 mg (on Saturday and Sunday) by tomorrow

## 2024-07-07 NOTE — PLAN OF CARE
Problem: PAIN - ADULT  Goal: Verbalizes/displays adequate comfort level or baseline comfort level  Description: Interventions:  - Encourage patient to monitor pain and request assistance  - Assess pain using appropriate pain scale  - Administer analgesics based on type and severity of pain and evaluate response  - Implement non-pharmacological measures as appropriate and evaluate response  - Consider cultural and social influences on pain and pain management  - Notify physician/advanced practitioner if interventions unsuccessful or patient reports new pain  Outcome: Progressing     Problem: INFECTION - ADULT  Goal: Absence or prevention of progression during hospitalization  Description: INTERVENTIONS:  - Assess and monitor for signs and symptoms of infection  - Monitor lab/diagnostic results  - Monitor all insertion sites, i.e. indwelling lines, tubes, and drains  - Monitor endotracheal if appropriate and nasal secretions for changes in amount and color  - Ward appropriate cooling/warming therapies per order  - Administer medications as ordered  - Instruct and encourage patient and family to use good hand hygiene technique  - Identify and instruct in appropriate isolation precautions for identified infection/condition  Outcome: Progressing     Problem: SAFETY ADULT  Goal: Patient will remain free of falls  Description: INTERVENTIONS:  - Educate patient/family on patient safety including physical limitations  - Instruct patient to call for assistance with activity   - Consult OT/PT to assist with strengthening/mobility   - Keep Call bell within reach  - Keep bed low and locked with side rails adjusted as appropriate  - Keep care items and personal belongings within reach  - Initiate and maintain comfort rounds  - Make Fall Risk Sign visible to staff  - Offer Toileting every 2 Hours, in advance of need  - Initiate/Maintain bed/chair alarm  - Obtain necessary fall risk management equipment: bed/chair   - Apply  yellow socks and bracelet for high fall risk patients  - Consider moving patient to room near nurses station  Outcome: Progressing

## 2024-07-07 NOTE — PHYSICAL THERAPY NOTE
"                                                                                  PHYSICAL THERAPY EVALUATION NOTE          Patient Name: Nola Welch  Today's Date: 24 0904   Note Type   Note type Evaluation   Pain Assessment   Pain Assessment Tool 0-10   Pain Score No Pain   Restrictions/Precautions   Weight Bearing Precautions Per Order No   Other Precautions Cognitive;Impulsive;Chair Alarm;Bed Alarm;O2;Fall Risk  (initally pt on RA oren read of 87%, per LPN dia O2 placed back on pt at 1L sat to  throughout)   Home Living   Type of Home House  (ranch)   Home Layout One level;Performs ADLs on one level;Able to live on main level with bedroom/bathroom;Stairs to enter with rails  (3 WOLFGANG with b/l HR)   Bathroom Shower/Tub Tub/shower unit   Bathroom Toilet Standard   Bathroom Equipment Grab bars in shower;Shower chair;Grab bars around toilet   Home Equipment Cane;Walker   Additional Comments does not use AD in home however keeps a cane in her car that she uses when she goes out   Prior Function   Level of [place] Independent with ADLs;Independent with functional mobility;Independent with IADLS   Lives With (S)  Alone   Receives Help From Home health  (states a nurse comes in during the week)   IADLs Independent with driving;Independent with meal prep;Independent with medication management  (states pharmacy organizes her medications and mails them to her)   Falls in the last 6 months 1 to 4  (admits 2?)   General   Family/Caregiver Present No   Cognition   Attention Attends with cues to redirect   Orientation Level Oriented X4   Memory Decreased short term memory   Following Commands Follows one step commands with increased time or repetition   Comments Pt ID by wristband, , name. Subjective questioning required multiple attempts to gain responses with brief moments of eyes closed requiring cues to attend, reporting feeling \"sleepy\" however upon asking if pt slept well, " "responds \"yes:\"   Subjective   Subjective \"I need to use the bathroom\"   RLE Assessment   RLE Assessment X  (Functionally assessed 3+/5)   LLE Assessment   LLE Assessment X  (Functionally assessed 3+/5)   Vision-Basic Assessment   Current Vision   (was wearing glasses for entire encounter)   Five Times Sit To Stand   Time (Seconds)   (30 second STS: 6 reps completed; 5xSTS completed in 16.4 sec)   Bed Mobility   Sit to Supine 5  Supervision   Additional items Impulsive;Verbal cues;HOB elevated;Assist x 1;Bedrails   Additional Comments denies dizziness upon sitting at R EOB  (Simultaneous filing. User may not have seen previous data.)   Transfers   Sit to Stand 5  Supervision   Additional items Assist x 1;Impulsive;Verbal cues  (from EOB x1, recliner x2; vc for hand placement on bed prior to initiating stand. Pt quickly stood up and began walking without gaining static balance prior to reaching for RW handles.)   Stand to Sit 5  Supervision   Additional items Assist x 1;Impulsive;Armrests;Verbal cues  (to recliner x2)   Toilet transfer 5  Supervision   Additional items Assist x 1;Impulsive;Armrests;Standard toilet;Verbal cues   Additional Comments pt impulsive with all transfers and dec safety awarness/judgement. Requiring significant vc for hand placement, RW mgmt, safety, proximity to surface t/o all transfers.   Ambulation/Elevation   Gait pattern Step through pattern;[place] SHABNAM   Gait Assistance 5  Supervision   Additional items Assist x 1;Verbal cues  (Close supervision with safety. pt impulsive with all ambulation within room, dec safety awareness)   Assistive Device Rolling walker   Distance 12 ft x1, 6 ft x1, 14 ft x1, 14 ft x2 (directional change)  (bed to toilet, toilet to sink, sink to chair, to door and back to recliner)   Ambulation/Elevation Additional Comments denies dizziness with all transfers and ambulation. vc for hand placement on bed prior to initiating stand. Pt quickly stood up and began " walking without gaining static balance prior to reaching for RW handles. Toilet transfer: pt abandon RW prior to gaining close proximity to toilet, quickly sat down without letting go of RW. vc for RW mgmt and HR usage prior to sitting down. pt instructed to give notice prior to standing up however no carry over with instruction, pt attempting to stand. Vc for increased proximity to RW when washing hands.   Balance   Static Sitting Good   Dynamic Sitting Fair -  (unilateral UE use observed on toilet for self pericare)   Static Standing Fair  (RW)   Dynamic Standing Fair -  (RW; able to wash hands and reach for papertowel at sink w/o LOB)   Ambulatory Fair -  (RW)   Activity Tolerance   Activity Tolerance Patient limited by fatigue   Nurse Made Aware Nurse dia pre/post, during session for oxygen saturation levels   Assessment   Prognosis Fair   Problem List Obesity;Decreased safety awareness;Impaired judgement;Decreased mobility;Impaired balance;Decreased endurance;Decreased strength   Assessment Pt is a 83 yo female admitted to [facility] on 7/3/2024 with dx of acute exacerbation of CHF. Pt PMH significant for Afib with RVR, HTN, depression, acute respiratory failure with hypoxia, CHF and falls. PT consulted with order for PT eval and treat. Prior to admission, pt was independent with all IADLs, ADLs and functional mobility. Pt utilizes a cane only when ambulating within the community. Upon evaluation, pt performs all bed mobility with supervision and all transfers with RW and supervision due to impulsive tendencies and decreased balance. Pt ambulates with RW and close supervision demonstrating deficits in balance and endurance. Pt performs all functional mobility and ambulation with impulsive tendencies, decreased safety awareness and judgement, placing pt at an increased risk for falls. Given impairments discussed, current medical treatment and PMH, pt status is currently unstable/unpredictable. Given this, pt is not  currently functioning at baseline and would benefit from skill PT services in order to address aforementioned impairments and decrease risk for falls. PT recommendation is level II discharge disposition.   Goals   [facility] Expiration Date 07/21/24   Short Term Goal #1 1. Pt will ambulate for at least 150 ft independently and LRAD in order to ambulate within the community.  2. Pt will ascend/descend at least 3 stairs with unilateral HR independently in order to simulate those required to enter home.   3. Pt will perform all transfers independently in order to return to [facility].  4. Pt will improve all aspects of balance by 1 grade independently in order to decrease risk for falls with functional mobility.  5. Pt will perform bed mobility independently in order to perform ADLs.   Plan   Treatment/Interventions ADL retraining;Functional transfer training;LE strengthening/ROM;Elevations;Therapeutic exercise;Endurance training;Patient/family training;Gait training;Family;Spoke to nursing;Spoke to case management;Equipment eval/education;Bed mobility   PT Frequency 2-3x/wk   Discharge Recommendation   Rehab Resource Intensity Level, PT II (Moderate Resource Intensity)   Equipment Recommended Walker   AM-PAC Basic Mobility Inpatient   Turning in Flat Bed Without Bedrails 3   Lying on Back to Sitting on Edge of Flat Bed Without Bedrails 3   Moving Bed to Chair 3   Standing Up From Chair Using Arms 3   Walk in Room 3   Climb 3-5 Stairs With Railing 3   Basic Mobility Inpatient Raw Score 18   Basic Mobility Standardized Score 41.05   [facility] Highest Level Of Mobility   -HLM Goal 6: Walk 10 steps or more   -HLM Achieved 7: Walk 25 feet or more   End of Consult   Patient Position at End of Consult Bedside chair;Bed/Chair alarm activated;All needs within reach     The patient's AM-PAC Basic Mobility Inpatient Short Form Raw Score is 18. A Raw score of greater than or equal to 16 suggests the patient may benefit from discharge  to home. However, Please also refer to the recommendation of the Physical Therapist for safe discharge planning.     Sandra Alvarenga, SPT

## 2024-07-08 ENCOUNTER — APPOINTMENT (INPATIENT)
Dept: NON INVASIVE DIAGNOSTICS | Facility: HOSPITAL | Age: 84
DRG: 291 | End: 2024-07-08
Payer: MEDICARE

## 2024-07-08 PROBLEM — E87.6 HYPOKALEMIA: Status: ACTIVE | Noted: 2024-07-08

## 2024-07-08 PROBLEM — E83.42 HYPOMAGNESEMIA: Status: ACTIVE | Noted: 2024-07-08

## 2024-07-08 PROBLEM — I50.41 ACUTE COMBINED SYSTOLIC AND DIASTOLIC CONGESTIVE HEART FAILURE (HCC): Status: ACTIVE | Noted: 2024-07-03

## 2024-07-08 PROBLEM — E11.9 TYPE 2 DIABETES MELLITUS (HCC): Status: ACTIVE | Noted: 2024-07-08

## 2024-07-08 PROBLEM — Z91.89 AT RISK FOR DELIRIUM: Status: ACTIVE | Noted: 2024-07-08

## 2024-07-08 PROBLEM — R41.89 COGNITIVE IMPAIRMENT: Status: ACTIVE | Noted: 2024-07-08

## 2024-07-08 PROBLEM — R79.1 SUPRATHERAPEUTIC INR: Status: ACTIVE | Noted: 2024-07-08

## 2024-07-08 LAB
ANION GAP SERPL CALCULATED.3IONS-SCNC: 5 MMOL/L (ref 4–13)
ANION GAP SERPL CALCULATED.3IONS-SCNC: 7 MMOL/L (ref 4–13)
AORTIC ROOT: 2.7 CM
APICAL FOUR CHAMBER EJECTION FRACTION: 43 %
ASCENDING AORTA: 3 CM
AV LVOT MEAN GRADIENT: 1 MMHG
AV LVOT PEAK GRADIENT: 2 MMHG
BSA FOR ECHO PROCEDURE: 1.83 M2
BUN SERPL-MCNC: 14 MG/DL (ref 5–25)
BUN SERPL-MCNC: 14 MG/DL (ref 5–25)
CALCIUM SERPL-MCNC: 9.4 MG/DL (ref 8.4–10.2)
CALCIUM SERPL-MCNC: 9.6 MG/DL (ref 8.4–10.2)
CHLORIDE SERPL-SCNC: 97 MMOL/L (ref 96–108)
CHLORIDE SERPL-SCNC: 99 MMOL/L (ref 96–108)
CO2 SERPL-SCNC: 33 MMOL/L (ref 21–32)
CO2 SERPL-SCNC: 34 MMOL/L (ref 21–32)
CREAT SERPL-MCNC: 0.8 MG/DL (ref 0.6–1.3)
CREAT SERPL-MCNC: 0.83 MG/DL (ref 0.6–1.3)
DOP CALC LVOT PEAK VEL VTI: 12.02 CM
DOP CALC LVOT PEAK VEL: 0.72 M/S
ERYTHROCYTE [DISTWIDTH] IN BLOOD BY AUTOMATED COUNT: 15.3 % (ref 11.6–15.1)
FRACTIONAL SHORTENING: 25 (ref 28–44)
GFR SERPL CREATININE-BSD FRML MDRD: 65 ML/MIN/1.73SQ M
GFR SERPL CREATININE-BSD FRML MDRD: 68 ML/MIN/1.73SQ M
GLUCOSE SERPL-MCNC: 107 MG/DL (ref 65–140)
GLUCOSE SERPL-MCNC: 177 MG/DL (ref 65–140)
GLUCOSE SERPL-MCNC: 180 MG/DL (ref 65–140)
HCT VFR BLD AUTO: 44.4 % (ref 34.8–46.1)
HGB BLD-MCNC: 14 G/DL (ref 11.5–15.4)
INR PPP: 1.6 (ref 0.84–1.19)
INTERVENTRICULAR SEPTUM IN DIASTOLE (PARASTERNAL SHORT AXIS VIEW): 1.1 CM
INTERVENTRICULAR SEPTUM: 1.1 CM (ref 0.6–1.1)
LAAS-AP2: 24.9 CM2
LAAS-AP4: 30.3 CM2
LEFT ATRIUM AREA SYSTOLE SINGLE PLANE A4C: 31.9 CM2
LEFT ATRIUM SIZE: 4.7 CM
LEFT ATRIUM VOLUME (MOD BIPLANE): 99 ML
LEFT ATRIUM VOLUME INDEX (MOD BIPLANE): 54.1 ML/M2
LEFT INTERNAL DIMENSION IN SYSTOLE: 3.8 CM (ref 2.1–4)
LEFT VENTRICULAR INTERNAL DIMENSION IN DIASTOLE: 5.1 CM (ref 3.5–6)
LEFT VENTRICULAR POSTERIOR WALL IN END DIASTOLE: 1.1 CM
LEFT VENTRICULAR STROKE VOLUME: 62 ML
LVSV (TEICH): 62 ML
MAGNESIUM SERPL-MCNC: 1.9 MG/DL (ref 1.9–2.7)
MCH RBC QN AUTO: 29 PG (ref 26.8–34.3)
MCHC RBC AUTO-ENTMCNC: 31.5 G/DL (ref 31.4–37.4)
MCV RBC AUTO: 92 FL (ref 82–98)
MITRAL REGURGITATION PEAK VELOCITY: 5.62 M/S
MITRAL VALVE MEAN INFLOW VELOCITY: 4.12 M/S
MITRAL VALVE REGURGITANT PEAK GRADIENT: 126 MMHG
MV EROA: 0.2 CM2
MV REGURGITANT VOLUME: 29 ML
PISA MRMAX VEL: 0.39 M/S
PISA RADIUS: 0.6 CM
PLATELET # BLD AUTO: 239 THOUSANDS/UL (ref 149–390)
PMV BLD AUTO: 11.3 FL (ref 8.9–12.7)
POTASSIUM SERPL-SCNC: 3 MMOL/L (ref 3.5–5.3)
POTASSIUM SERPL-SCNC: 3.3 MMOL/L (ref 3.5–5.3)
PROTHROMBIN TIME: 19.9 SECONDS (ref 11.6–14.5)
RA PRESSURE ESTIMATED: 8 MMHG
RBC # BLD AUTO: 4.82 MILLION/UL (ref 3.81–5.12)
RIGHT ATRIAL 2D VOLUME: 108 ML
RIGHT ATRIUM AREA SYSTOLE A4C: 26.9 CM2
RIGHT VENTRICLE ID DIMENSION: 4.1 CM
RV PSP: 70 MMHG
SL CV DOP CALC MV VTI RETROGRADE: 175.4 CM
SL CV LEFT ATRIUM LENGTH A2C: 6.1 CM
SL CV LV EF: 40
SL CV MV MEAN GRADIENT RETROGRADE: 75 MMHG
SL CV PED ECHO LEFT VENTRICLE DIASTOLIC VOLUME (MOD BIPLANE) 2D: 123 ML
SL CV PED ECHO LEFT VENTRICLE SYSTOLIC VOLUME (MOD BIPLANE) 2D: 61 ML
SODIUM SERPL-SCNC: 137 MMOL/L (ref 135–147)
SODIUM SERPL-SCNC: 138 MMOL/L (ref 135–147)
TR MAX PG: 62 MMHG
TR PEAK VELOCITY: 3.9 M/S
TRICUSPID ANNULAR PLANE SYSTOLIC EXCURSION: 1.4 CM
TRICUSPID VALVE PEAK REGURGITATION VELOCITY: 3.93 M/S
VIT B12 SERPL-MCNC: 388 PG/ML (ref 180–914)
WBC # BLD AUTO: 10.35 THOUSAND/UL (ref 4.31–10.16)

## 2024-07-08 PROCEDURE — 80048 BASIC METABOLIC PNL TOTAL CA: CPT | Performed by: INTERNAL MEDICINE

## 2024-07-08 PROCEDURE — 99233 SBSQ HOSP IP/OBS HIGH 50: CPT | Performed by: INTERNAL MEDICINE

## 2024-07-08 PROCEDURE — 85027 COMPLETE CBC AUTOMATED: CPT | Performed by: INTERNAL MEDICINE

## 2024-07-08 PROCEDURE — 93306 TTE W/DOPPLER COMPLETE: CPT

## 2024-07-08 PROCEDURE — 93306 TTE W/DOPPLER COMPLETE: CPT | Performed by: INTERNAL MEDICINE

## 2024-07-08 PROCEDURE — 85610 PROTHROMBIN TIME: CPT | Performed by: INTERNAL MEDICINE

## 2024-07-08 PROCEDURE — 82607 VITAMIN B-12: CPT | Performed by: FAMILY MEDICINE

## 2024-07-08 PROCEDURE — 83735 ASSAY OF MAGNESIUM: CPT | Performed by: INTERNAL MEDICINE

## 2024-07-08 PROCEDURE — 82948 REAGENT STRIP/BLOOD GLUCOSE: CPT

## 2024-07-08 PROCEDURE — 80048 BASIC METABOLIC PNL TOTAL CA: CPT

## 2024-07-08 PROCEDURE — 99222 1ST HOSP IP/OBS MODERATE 55: CPT | Performed by: FAMILY MEDICINE

## 2024-07-08 RX ORDER — POTASSIUM CHLORIDE 20 MEQ/1
40 TABLET, EXTENDED RELEASE ORAL ONCE
Status: COMPLETED | OUTPATIENT
Start: 2024-07-08 | End: 2024-07-08

## 2024-07-08 RX ORDER — AMITRIPTYLINE HYDROCHLORIDE 10 MG/1
10 TABLET, FILM COATED ORAL DAILY
Status: DISCONTINUED | OUTPATIENT
Start: 2024-07-09 | End: 2024-07-09

## 2024-07-08 RX ORDER — POTASSIUM CHLORIDE 20 MEQ/1
20 TABLET, EXTENDED RELEASE ORAL ONCE
Status: COMPLETED | OUTPATIENT
Start: 2024-07-08 | End: 2024-07-08

## 2024-07-08 RX ORDER — INSULIN LISPRO 100 [IU]/ML
1-6 INJECTION, SOLUTION INTRAVENOUS; SUBCUTANEOUS
Status: DISCONTINUED | OUTPATIENT
Start: 2024-07-08 | End: 2024-07-09 | Stop reason: HOSPADM

## 2024-07-08 RX ORDER — HYDRALAZINE HYDROCHLORIDE 20 MG/ML
5 INJECTION INTRAMUSCULAR; INTRAVENOUS EVERY 6 HOURS PRN
Status: DISCONTINUED | OUTPATIENT
Start: 2024-07-08 | End: 2024-07-09 | Stop reason: HOSPADM

## 2024-07-08 RX ORDER — POLYETHYLENE GLYCOL 3350 17 G/17G
17 POWDER, FOR SOLUTION ORAL DAILY
Status: DISCONTINUED | OUTPATIENT
Start: 2024-07-08 | End: 2024-07-09 | Stop reason: HOSPADM

## 2024-07-08 RX ORDER — ESCITALOPRAM OXALATE 10 MG/1
10 TABLET ORAL DAILY
Status: DISCONTINUED | OUTPATIENT
Start: 2024-07-09 | End: 2024-07-08

## 2024-07-08 RX ORDER — FUROSEMIDE 40 MG/1
40 TABLET ORAL DAILY
Status: DISCONTINUED | OUTPATIENT
Start: 2024-07-09 | End: 2024-07-09 | Stop reason: HOSPADM

## 2024-07-08 RX ORDER — MAGNESIUM SULFATE HEPTAHYDRATE 40 MG/ML
2 INJECTION, SOLUTION INTRAVENOUS ONCE
Status: COMPLETED | OUTPATIENT
Start: 2024-07-08 | End: 2024-07-08

## 2024-07-08 RX ORDER — AMOXICILLIN 250 MG
1 CAPSULE ORAL
Status: DISCONTINUED | OUTPATIENT
Start: 2024-07-08 | End: 2024-07-09 | Stop reason: HOSPADM

## 2024-07-08 RX ORDER — PERPHENAZINE 2 MG/1
2 TABLET ORAL DAILY
Status: DISCONTINUED | OUTPATIENT
Start: 2024-07-09 | End: 2024-07-09

## 2024-07-08 RX ADMIN — TRIAMCINOLONE ACETONIDE: 5 CREAM TOPICAL at 08:59

## 2024-07-08 RX ADMIN — LEVOTHYROXINE SODIUM 50 MCG: 50 TABLET ORAL at 05:22

## 2024-07-08 RX ADMIN — NYSTATIN: 100000 POWDER TOPICAL at 09:00

## 2024-07-08 RX ADMIN — POTASSIUM CHLORIDE 40 MEQ: 1500 TABLET, EXTENDED RELEASE ORAL at 17:27

## 2024-07-08 RX ADMIN — ATORVASTATIN CALCIUM 40 MG: 40 TABLET, FILM COATED ORAL at 17:27

## 2024-07-08 RX ADMIN — WARFARIN SODIUM 4 MG: 4 TABLET ORAL at 22:43

## 2024-07-08 RX ADMIN — POTASSIUM CHLORIDE 40 MEQ: 1500 TABLET, EXTENDED RELEASE ORAL at 12:03

## 2024-07-08 RX ADMIN — POTASSIUM CHLORIDE 10 MEQ: 750 TABLET, EXTENDED RELEASE ORAL at 09:00

## 2024-07-08 RX ADMIN — POLYETHYLENE GLYCOL 3350 17 G: 17 POWDER, FOR SOLUTION ORAL at 22:42

## 2024-07-08 RX ADMIN — POTASSIUM CHLORIDE 20 MEQ: 1500 TABLET, EXTENDED RELEASE ORAL at 17:27

## 2024-07-08 RX ADMIN — FUROSEMIDE 40 MG: 10 INJECTION, SOLUTION INTRAMUSCULAR; INTRAVENOUS at 08:51

## 2024-07-08 RX ADMIN — PERPHENAZINE 4 MG: 4 TABLET, FILM COATED ORAL at 09:01

## 2024-07-08 RX ADMIN — METOPROLOL TARTRATE 25 MG: 25 TABLET, FILM COATED ORAL at 09:00

## 2024-07-08 RX ADMIN — CEFAZOLIN SODIUM 2000 MG: 2 SOLUTION INTRAVENOUS at 04:41

## 2024-07-08 RX ADMIN — POTASSIUM CHLORIDE 20 MEQ: 1500 TABLET, EXTENDED RELEASE ORAL at 12:03

## 2024-07-08 RX ADMIN — SENNOSIDES AND DOCUSATE SODIUM 1 TABLET: 50; 8.6 TABLET ORAL at 22:43

## 2024-07-08 RX ADMIN — AMITRIPTYLINE HYDROCHLORIDE 20 MG: 10 TABLET, FILM COATED ORAL at 09:00

## 2024-07-08 RX ADMIN — MAGNESIUM SULFATE HEPTAHYDRATE 2 G: 40 INJECTION, SOLUTION INTRAVENOUS at 11:57

## 2024-07-08 RX ADMIN — METOPROLOL TARTRATE 25 MG: 25 TABLET, FILM COATED ORAL at 22:46

## 2024-07-08 RX ADMIN — TRIAMCINOLONE ACETONIDE: 5 CREAM TOPICAL at 18:27

## 2024-07-08 RX ADMIN — LISINOPRIL 20 MG: 20 TABLET ORAL at 09:00

## 2024-07-08 RX ADMIN — NYSTATIN: 100000 POWDER TOPICAL at 18:26

## 2024-07-08 NOTE — ASSESSMENT & PLAN NOTE
Lab Results   Component Value Date    K 3.0 (L) 07/08/2024    K 3.4 (L) 07/07/2024    K 3.8 07/06/2024     Patient takes Kdur 10 mEq qd at home    Plan:  Continue home Kdur  Give oral K 60 mEq now  Repeat BMP at 2 PM  Keep K > 4  Check BMP daily

## 2024-07-08 NOTE — PLAN OF CARE
Problem: PAIN - ADULT  Goal: Verbalizes/displays adequate comfort level or baseline comfort level  Description: Interventions:  - Encourage patient to monitor pain and request assistance  - Assess pain using appropriate pain scale  - Administer analgesics based on type and severity of pain and evaluate response  - Implement non-pharmacological measures as appropriate and evaluate response  - Consider cultural and social influences on pain and pain management  - Notify physician/advanced practitioner if interventions unsuccessful or patient reports new pain  Outcome: Progressing     Problem: INFECTION - ADULT  Goal: Absence or prevention of progression during hospitalization  Description: INTERVENTIONS:  - Assess and monitor for signs and symptoms of infection  - Monitor lab/diagnostic results  - Monitor all insertion sites, i.e. indwelling lines, tubes, and drains  - Monitor endotracheal if appropriate and nasal secretions for changes in amount and color  - [place] appropriate cooling/warming therapies per order  - Administer medications as ordered  - Instruct and encourage patient and family to use good hand hygiene technique  - Identify and instruct in appropriate isolation precautions for identified infection/condition  Outcome: Progressing  Goal: Absence of fever/infection during neutropenic period  Description: INTERVENTIONS:  - Monitor WBC    Outcome: Progressing     Problem: SAFETY ADULT  Goal: Patient will remain free of falls  Description: INTERVENTIONS:  - Educate patient/family on patient safety including physical limitations  - Instruct patient to call for assistance with activity   - Consult OT/PT to assist with strengthening/mobility   - Keep Call bell within reach  - Keep bed low and locked with side rails adjusted as appropriate  - Keep care items and personal belongings within reach  - Initiate and maintain comfort rounds  - Make Fall Risk Sign visible to staff  - Offer Toileting every  Hours,  in advance of need  - Initiate/Maintain alarm  - Obtain necessary fall risk management equipment:   - Apply yellow socks and bracelet for high fall risk patients  - Consider moving patient to room near nurses station  Outcome: Progressing  Goal: Maintain or return to baseline ADL function  Description: INTERVENTIONS:  -  Assess patient's ability to carry out ADLs; assess patient's baseline for ADL function and identify physical deficits which impact ability to perform ADLs (bathing, care of mouth/teeth, toileting, grooming, dressing, etc.)  - Assess/evaluate cause of self-care deficits   - Assess range of motion  - Assess patient's mobility; develop plan if impaired  - Assess patient's need for assistive devices and provide as appropriate  - Encourage maximum independence but intervene and supervise when necessary  - Involve family in performance of ADLs  - Assess for home care needs following discharge   - Consider OT consult to assist with ADL evaluation and planning for discharge  - Provide patient education as appropriate  Outcome: Progressing  Goal: Maintains/Returns to pre admission functional level  Description: INTERVENTIONS:  - Perform AM-PAC 6 Click Basic Mobility/ Daily Activity assessment daily.  - Set and communicate daily mobility goal to care team and patient/family/caregiver.   - Collaborate with rehabilitation services on mobility goals if consulted  - Perform Range of Motion  times a day.  - Reposition patient every  hours.  - Dangle patient  times a day  - Stand patient  times a day  - Ambulate patient  times a day  - Out of bed to chair  times a day   - Out of bed for meals  times a day  - Out of bed for toileting  - Record patient progress and toleration of activity level   Outcome: Progressing     Problem: Prexisting or High Potential for Compromised Skin Integrity  Goal: Skin integrity is maintained or improved  Description: INTERVENTIONS:  - Identify patients at risk for skin breakdown  -  Assess and monitor skin integrity  - Assess and monitor nutrition and hydration status  - Monitor labs   - Assess for incontinence   - Turn and reposition patient  - Assist with mobility/ambulation  - Relieve pressure over bony prominences  - Avoid friction and shearing  - Provide appropriate hygiene as needed including keeping skin clean and dry  - Evaluate need for skin moisturizer/barrier cream  - Collaborate with interdisciplinary team   - Patient/family teaching  - Consider wound care consult   Outcome: Progressing     Problem: Nutrition/Hydration-ADULT  Goal: Nutrient/Hydration intake appropriate for improving, restoring or maintaining nutritional needs  Description: Monitor and assess patient's nutrition/hydration status for malnutrition. Collaborate with interdisciplinary team and initiate plan and interventions as ordered.  Monitor patient's weight and dietary intake as ordered or per policy. Utilize nutrition screening tool and intervene as necessary. Determine patient's food preferences and provide high-protein, high-caloric foods as appropriate.     INTERVENTIONS:  - Monitor oral intake, urinary output, labs, and treatment plans  - Assess nutrition and hydration status and recommend course of action  - Evaluate amount of meals eaten  - Assist patient with eating if necessary   - Allow adequate time for meals  - Recommend/ encourage appropriate diets, oral nutritional supplements, and vitamin/mineral supplements  - Order, calculate, and assess calorie counts as needed  - Recommend, monitor, and adjust tube feedings and TPN/PPN based on assessed needs  - Assess need for intravenous fluids  - Provide specific nutrition/hydration education as appropriate  - Include patient/family/caregiver in decisions related to nutrition  Outcome: Progressing     Problem: Potential for Falls  Goal: Patient will remain free of falls  Description: INTERVENTIONS:  - Educate patient/family on patient safety including physical  limitations  - Instruct patient to call for assistance with activity   - Consult OT/PT to assist with strengthening/mobility   - Keep Call bell within reach  - Keep bed low and locked with side rails adjusted as appropriate  - Keep care items and personal belongings within reach  - Initiate and maintain comfort rounds  - Make Fall Risk Sign visible to staff  - Offer Toileting every  Hours, in advance of need  - Initiate/Maintain alarm  - Obtain necessary fall risk management equipment:  - Apply yellow socks and bracelet for high fall risk patients  - Consider moving patient to room near nurses station  Outcome: Progressing

## 2024-07-08 NOTE — ASSESSMENT & PLAN NOTE
-Currently rate controlled.  -INR 1.73. Subtherapeutic  -Continue to monitor INR.  -Continue Warfarin 4 mg QD.  -Titrate Warfarin dose to maintain INR 2-3.  -Goal INR is 2-3.

## 2024-07-08 NOTE — ASSESSMENT & PLAN NOTE
-Patient is high risk of delirium due to acute exacerbation of CHF, erysipelas of LE, nontherapeutic INR, mild hypokalemia, prolonged QTC, hypertension, change in environment, age.  -Continue with delirium precautions.  -Maintain normal sleep/wake cycle.  -Minimize overnight interruptions, group overnight vitals/labs/nursing checks as possible.  -Dim lights, close blinds, and turn off TV to minimize stimulation and encourage sleep environment in evenings.  -Ensure that pain is well controlled. Continue Tylenol 650 mg Q6H PRN.  -Continue to monitor for fecal and urinary retention which may precipitate delirium.  -Encourage early mobilization and ambulation.  -Provide frequent reorientation and redirection.  -Encourage family and friends at the bedside to help calm patient if anxious.  -Avoid medications which may precipitate or worsen delirium such as tramadol, benzodiazepine, anticholinergics, and antihistaminics.  -Encourage hydration and nutrition, assist with feeding if needed.  -Redirect unwanted behaviors as first line, avoid physical restraints.

## 2024-07-08 NOTE — ASSESSMENT & PLAN NOTE
-Patient reports diarrhea at time of evaluation. Denies associated fever or chills.  -Increase fiber and water intake.  -Avoid Imodium.  -Encourage consumption of Banana Flakes.  -Monitor for fecal incontinence.

## 2024-07-08 NOTE — ASSESSMENT & PLAN NOTE
-Currently seems to be controlled.  -Recommend switching to a safer alternative for elderly.  - taper off amitriptyline and perphenazine  -Follow up with psychiatry as outpatient.

## 2024-07-08 NOTE — PROGRESS NOTES
[facility]  Progress Note  Name: Nola Welch I  MRN: [id number]  Unit/Bed#: W -01 I Date of Admission: 7/3/2024   Date of Service: 7/8/2024 I Hospital Day: 5    Assessment & Plan   * Acute exacerbation of CHF (congestive heart failure) (HCC)  Assessment & Plan  Wt Readings from Last 3 Encounters:   07/06/24 85.6 kg (188 lb 11.4 oz)   04/02/23 83.2 kg (183 lb 6.8 oz)   08/27/21 85.3 kg (188 lb)     Lab Results   Component Value Date     (H) 07/03/2024     (H) 11/13/2023       Presents with dyspnea on exertion and lower extremity edema  Patient is currently volume overloaded.  Last echo in June 2023 showed LVEF 60 to 65% with normal systolic function. Indeterminate diastolic function due to atrial fibrillation.   Home meds GDMT: Diuretic: Oral Lasix 20 mg daily, B-Blocker: Metoprolol daily dose reduced to 12.5 mg BID, ACE/ARB/ARNi: Lisinopril 20 mg daily  Currently requiring 2L of oxygen via nasal cannula. Not on any home oxygen.   Likely in the setting of volume overload.    Plan:  Switch to PO Lasix 40 mg starting tomorrow  Echocardiogram to assess for worsening diastolic dysfunction from prior echocardiogram given persistently elevated blood pressures  Increase to Lopressor 25 mg BID for hypertension.  Now has improved heart rates  If blood pressures remain elevated, consider increasing lisinopril dose or adding spironolactone    Atrial fibrillation (HCC)  Assessment & Plan  Patient reports A-fib on warfarin 4 mg Monday to Friday.    Lab Results   Component Value Date    INR 1.52 (H) 07/07/2024    INR 1.81 (H) 07/06/2024    INR 1.98 (H) 07/05/2024     Gave additional dose of Warfarin on Saturday and sunday, as she is subtherapeutic.  Patient continues to be subtherapeutic  Check INR daily. Expect to see effect of extra Warfarin 8 mg (on Saturday and Sunday) by tomorrow    Hypokalemia  Assessment & Plan  Lab Results   Component Value Date    K 3.0 (L) 07/08/2024    K  3.4 (L) 07/07/2024    K 3.8 07/06/2024     Patient takes Kdur 10 mEq qd at home    Plan:  Continue home Kdur  Give oral K 60 mEq now  Repeat BMP at 2 PM  Keep K > 4  Check BMP daily    Hypomagnesemia  Assessment & Plan  Lab Results   Component Value Date    MG 1.9 07/08/2024    MG 1.8 (L) 07/07/2024    MG 2.1 07/04/2024     Plan:  Give IV Mg 2g now  Check Mg in the morning  Keep Mg > 2    Erysipelas of both lower extremities  Assessment & Plan  Patient [facility] ED on 7/[facility] for ankle rash.  Was prescribed Keflex times 3 times daily and discharged.  Patient not compliant with medication and return to [facility] for fall/dyspnea along with ongoing erythema, tenderness of bilateral lower extremities.  Significant improvement in erythema around face and bilateral legs.    Plan  Start Ancef while in the hospital.  Reported penicillin allergy as a kid but will continue to monitor  Today is IV Ancef day 5/5    Fall  Assessment & Plan  Patient reports that she let herself down at home this afternoon while sorting out garbage in the garage.  Denied head strike or injury.  Patient on Coumadin for A-fib.  CT head was negative.  Admitted for placement issue.    Plans:  Fall precautions  PT recommended Level II  Geriatrics consulted, appreciate recommendations    HLD (hyperlipidemia)  Assessment & Plan  Continue home med Lipitor    Hypothyroidism  Assessment & Plan  Continue home med levothyroxine    Total bilirubin, elevated-resolved as of 7/6/2024  Assessment & Plan  Total bilirubin 1.49 on admission in the setting of poor intake.    7/4 resolved    Hypercalcemia-resolved as of 7/6/2024  Assessment & Plan  Calcium 10.4 on admission  In the setting of poor oral intake    Resolved         VTE Pharmacologic Prophylaxis: VTE Score: 6 High Risk (Score >/= 5) - Pharmacological DVT Prophylaxis Ordered: warfarin (Coumadin). Sequential Compression Devices Ordered.    Mobility:   Basic Mobility Inpatient Raw Score:  18  JH-HLM Goal: 6: Walk 10 steps or more  JH-HLM Achieved: 6: Walk 10 steps or more  JH-HLM Goal achieved. Continue to encourage appropriate mobility.    Patient Centered Rounds: I performed bedside rounds with nursing staff today.  Discussions with Specialists or Other Care Team Provider: Geriatrics    Education and Discussions with Family / Patient: Updated  (sister) via phone.    Current Length of Stay: 5 day(s)  Current Patient Status: Inpatient   Discharge Plan: Anticipate discharge in 24-48 hrs to rehab facility.    Code Status: Level 1 - Full Code    Subjective:   Overnight, patient's blood pressures remained high, sbp between 160-180 and diastolic between bp 90s-low 100s.  Patient was seen and evaluated at bedside.  She denies any chest pain, difficulty breathing, leg swelling at this time.  She reports urinating well overnight.  She says that the rash on her face and bilateral lower extremities is improved as well.    Objective:     Vitals:   Temp (24hrs), Av °F (36.7 °C), Min:97.5 °F (36.4 °C), Max:98.4 °F (36.9 °C)    Temp:  [97.5 °F (36.4 °C)-98.4 °F (36.9 °C)] 98 °F (36.7 °C)  HR:  [59-87] 78  Resp:  [16] 16  BP: (159-180)/() 159/81  SpO2:  [89 %-99 %] 89 %  Body mass index is 28.56 kg/m².     Input and Output Summary (last 24 hours):     Intake/Output Summary (Last 24 hours) at 2024 1508  Last data filed at 2024 1156  Gross per 24 hour   Intake 240 ml   Output 2000 ml   Net -1760 ml       Physical Exam:   Physical Exam  Vitals reviewed.   Constitutional:       General: She is not in acute distress.     Appearance: Normal appearance. She is not toxic-appearing.   HENT:      Head: Normocephalic and atraumatic.      Mouth/Throat:      Mouth: Mucous membranes are moist.      Pharynx: Oropharynx is clear.   Eyes:      Conjunctiva/sclera: Conjunctivae normal.   Cardiovascular:      Rate and Rhythm: Normal rate. Rhythm irregular.   Pulmonary:      Effort: Pulmonary effort is  normal. No respiratory distress.      Breath sounds: Normal breath sounds. No wheezing or rales.   Abdominal:      General: Bowel sounds are normal. There is no distension.      Palpations: Abdomen is soft. There is no mass.      Tenderness: There is no abdominal tenderness. There is no guarding or rebound.   Musculoskeletal:         General: Normal range of motion.      Cervical back: Normal range of motion and neck supple. No tenderness.      Right lower leg: No edema.      Left lower leg: No edema.   Skin:     General: Skin is warm.      Capillary Refill: Capillary refill takes less than 2 seconds.      Findings: Rash (to face and bilateral lower extremities greatly improved) present.   Neurological:      Mental Status: She is alert and oriented to person, place, and time. Mental status is at baseline.      Cranial Nerves: No cranial nerve deficit.      Sensory: No sensory deficit.   Psychiatric:         Mood and Affect: Mood normal.          Additional Data:     Labs:  Results from last 7 days   Lab Units 07/08/24  0953 07/06/24  0606   WBC Thousand/uL 10.35* 9.21   HEMOGLOBIN g/dL 14.0 13.4   HEMATOCRIT % 44.4 42.1   PLATELETS Thousands/uL 239 203   SEGS PCT %  --  70   LYMPHO PCT %  --  13*   MONO PCT %  --  13*   EOS PCT %  --  3     Results from last 7 days   Lab Units 07/08/24  1348 07/06/24  0606 07/05/24  0613   SODIUM mmol/L 137   < > 137   POTASSIUM mmol/L 3.3*   < > 3.6   CHLORIDE mmol/L 97   < > 103   CO2 mmol/L 33*   < > 25   BUN mg/dL 14   < > 18   CREATININE mg/dL 0.83   < > 0.89   ANION GAP mmol/L 7   < > 9   CALCIUM mg/dL 9.6   < > 9.5   ALBUMIN g/dL  --   --  3.5   TOTAL BILIRUBIN mg/dL  --   --  0.82   ALK PHOS U/L  --   --  65   ALT U/L  --   --  9   AST U/L  --   --  16   GLUCOSE RANDOM mg/dL 180*   < > 124    < > = values in this interval not displayed.     Results from last 7 days   Lab Units 07/08/24  0953   INR  1.60*                   Lines/Drains:  Invasive Devices       Peripheral  Intravenous Line  Duration             Peripheral IV 07/07/24 Right Antecubital 1 day                      Imaging: Reviewed radiology reports from this admission including: chest xray and CT head    Recent Cultures (last 7 days):         Last 24 Hours Medication List:   Current Facility-Administered Medications   Medication Dose Route Frequency Provider Last Rate    acetaminophen  650 mg Oral Q6H PRN May Allison Donohue MD      perphenazine  4 mg Oral Daily May Allison Donohue MD      And    amitriptyline  20 mg Oral Daily May Allison Donohue MD      atorvastatin  40 mg Oral QPM May Allison Donohue MD      calcium carbonate  1,000 mg Oral Daily PRN May Allison Donohue MD      [START ON 7/9/2024] furosemide  40 mg Oral Daily Aleta Chua DO      hydrALAZINE  5 mg Intravenous Q6H PRN Aleta Chua DO      levothyroxine  50 mcg Oral Early Morning May Allison Donohue MD      lisinopril  20 mg Oral Daily May Allison Donohue MD      metoprolol tartrate  25 mg Oral Q12H Count includes the [facility] Aleta Chua DO      nystatin   Topical BID May Allison Donohue MD      ondansetron  4 mg Intravenous Q6H PRN May Allison Donohue MD      potassium chloride  10 mEq Oral Daily May Allison Donohue MD      triamcinolone   Topical BID Farrah Suarez MD      warfarin  4 mg Oral Once per day on Monday Tuesday Wednesday Thursday Friday May Allison Donohue MD          Today, Patient Was Seen By: Aleta Chua DO    **Please Note: This note may have been constructed using a voice recognition system.**

## 2024-07-08 NOTE — ASSESSMENT & PLAN NOTE
Wt Readings from Last 3 Encounters:   07/09/24 76 kg (167 lb 8.8 oz)   04/02/23 83.2 kg (183 lb 6.8 oz)   08/27/21 85.3 kg (188 lb)     -Bilateral leg swelling noted on PE. Patient reports improvement. Continued to report HESS.  -Continue Lasix 40 mg IV BID.  -Continue Hydralazine 5 mg IV Q6H PRN.  -Continue Metoprolol tartate 25 mg Q12.  -Continue Lisinopril 20 mg QD.  -Continue sodium restriction of 2g/day.  -Continue to monitor CMP.  -Continue to monitor I&Os.

## 2024-07-08 NOTE — ASSESSMENT & PLAN NOTE
-BP continues to be uncontrolled at 168/103 this AM.  -Continue to monitor blood pressure.  -Continue to monitor orthostatic vital signs.  -Encourage adequate PO hydration.  -Reduce salt intake. Continue sodium restriction of 2g/day.  -Avoid hypotension.  -Continue Lasix 40 mg PO QD.  -Continue Hydralazine 5 mg IV Q6H PRN.  -Continue Lisinopril 20 mg QD.  -Continue Metoprolol tartate 25 mg Q12H.

## 2024-07-08 NOTE — CASE MANAGEMENT
Case Management Progress Note    Patient name Nola Welch  Location W /W -01 MRN [id number]  : 1940 Date 2024       LOS (days): 5  Geometric Mean LOS (GMLOS) (days):   Days to GMLOS:        OBJECTIVE:        Current admission status: Inpatient  Preferred Pharmacy:   [id number]/pharmacy #[id number] - [place], [place] - [address]  [address] [address]  Phone: [phone] Fax: [phone]    Primary Care Provider: Kelsey King MD    Primary Insurance: MEDICARE  Secondary Insurance: [employer]    PROGRESS NOTE: Patient has changed her mind about STR, CM met to discuss referrals. Patient is in agreement with blanket referrals for STR. Referrals sent in Aidin. Patient choice list provided to patient. Patient chose [facility]. Bed reserved

## 2024-07-08 NOTE — CONSULTS
Consultation - Geriatric Medicine   Nola Welch 84 y.o. female MRN: [id number]  Unit/Bed#: W -01 Encounter: [id number]      Assessment & Plan     Hypokalemia  Assessment & Plan  -Potassium mildly low at 3.4.   -Encourage adequate PO hydration.  -Continue to monitor CMP daily.  -Continue potassium supplementation as necessary.    At risk for delirium  Assessment & Plan  -Patient is high risk of delirium due to acute exacerbation of CHF, erysipelas of LE, nontherapeutic INR, mild hypokalemia, prolonged QTC, hypertension, change in environment, age.  -Initiate delirium precautions.  -Maintain normal sleep/wake cycle.  -Minimize overnight interruptions, group overnight vitals/labs/nursing checks as possible.  -Dim lights, close blinds, and turn off TV to minimize stimulation and encourage sleep environment in evenings.  -Ensure that pain is well controlled. Continue Tylenol 650 mg Q6H PRN.  -Monitor for fecal and urinary retention which may precipitate delirium.  -Encourage early mobilization and ambulation.  -Provide frequent reorientation and redirection.  -Encourage family and friends at the bedside to help calm patient if anxious.  -Avoid medications which may precipitate or worsen delirium such as tramadol, benzodiazepine, anticholinergics, and antihistaminics.  -Encourage hydration and nutrition , assist with feeding if needed.  -Redirect unwanted behaviors as first line, avoid physical restraints.    Cognitive impairment  Assessment & Plan  -AAO x 3. Minicog 2/5.  -CTH revealed mild microangiopathic changes.  -Currently stable without behaviors noted.  -Continue to provide supportive care and reorient as needed.  -Patient is at high risk for delirium, will monitor closely and place on delirium precautions.  -Maintain sleep/wake cycle.  -Optimize pain regimen. Continue Tylenol 650 mg Q6H PRN.  -Monitor for constipation and urinary retention and manage as needed.  -Check B12 level TSH WNL at 1.478.  "  -Encourage family to visit.  -Encourage to wear glasses and hearing aids while awake.  -Encourage adequate PO intake, assist with feeding if needed.  -Recommend a complete geriatric assessment as outpatient, supervision with medication management and fit to drive test upon discharge.    Facial rash  Assessment & Plan  -Rash continues on left side of face around cheek area. Appears dry.  -Continue to monitor rash and evaluate for symptoms.    Erysipelas of both lower extremities  Assessment & Plan  -Continue Ancef 2000 mg IV.  -Last dose to be completed today, 7/8.  -Significant improvement in rash of bilateral extremities. Denies tenderness to palpation.  -Continue to monitor rash and evaluate for symptoms.    Diarrhea  Assessment & Plan  -Patient reports diarrhea at time of evaluation. Denies associated fever or chills.  -Increase fiber and water intake.  -Avoid Imodium.  -Encourage consumption of Banana Flakes.  -Monitor for fecal incontinence.    Fall  Assessment & Plan  -Patient describes \"letting herself down\" in her garage and inability to get up due to weakness prior to her admission.  -She briefly admits to several insigificant stumbles over the years where she has not sustained injuries.  -Patient ambulates with a walker and cane at her baseline.  - fall precautions.  -Encourage adequate PO hydration.  -Monitor orthostatic vital signs.  -Avoid hypotension and hypoglycemia.  -Continue PT/OT. Appreciate evaluation and recommendations.    Hypothyroidism  Assessment & Plan  -TSH WNL at 1.478.  -Continue to monitor TSH.  -Continue Levothyroxine 50 mcg QAM.    Hypertension  Assessment & Plan  -BP uncontrolled at 159/81 this AM.  -Continue to monitor blood pressure.  -Monitor orthostatic vital signs.  -Encourage adequate PO hydration.  -Reduce salt intake. Continue sodium restriction of 2g/day.  -Avoid hypotension.  -Continue Lasix 40 mg po daily  -Continue Hydralazine 5 mg IV Q6H PRN.  -Continue Lisinopril 20 " "mg QD.  -Continue Metoprolol tartate 25 mg Q12H.    Depression  Assessment & Plan  Currently seems to be controlled.  I would recommend switching to a safer alternative for elderly.  Follow up with psychiatry as outaptient    Atrial fibrillation with RVR ([facility])  Assessment & Plan  -Currently rate controlled.  -INR 1.52. subtherapeutic  -Continue to monitor INR.  -Continue Warfarin, adjust dose to maintain INR 2-3    * Acute exacerbation of CHF (congestive heart failure) ([facility])  Assessment & Plan  Wt Readings from Last 3 Encounters:   07/08/24 77 kg (169 lb 12.1 oz)   04/02/23 83.2 kg (183 lb 6.8 oz)   08/27/21 85.3 kg (188 lb)     -Bilateral leg swelling noted on PE. Patient reports improvement. Denies SOB at rest but admits to HESS. Dry cough noted.  -Continue Lasix 40 mg IV BID.  -Continue Hydralazine 5 mg IV Q6H PRN.  -Continue Metoprolol tartate 25 mg Q12.  -Continue Lisinopril 20 mg QD.  -Continue sodium restriction of 2g/day.  -Continue to monitor CMP.  -Continue to monitor I&Os.      History of Present Illness   Physician Requesting Consult: Farrah Abad*  Reason for Consult / Principal Problem: Cognitive Impairment.  Hx and PE limited by: N/A.  Additional [facility]: [facility] in [place], [place].      HPI: Nola Welch is a 84 y.o. year old female who presents with an exacerbation of CHF. She is being seen for an evaluation of cognitive impairment as a geriatric consult. She reports that she is functionally independent at her baseline. She ambulates with a walker/cane. When asked about other falls, she reports \"insignificant stumbles\" without injuries. She is able to perform all ADLs and most IADLs independently. Nola admits to having someone mow her lawn and clean her house monthly. Other than that, she continues to drive, manages her finances, and dispenses her medications to herself. She is moving her bowels, urinating, sleeping, drinking, and eating well.  Nola reports " awakening with a headache in the back of her neck due to the bed. She reports her left eye leaks clear discharge and burns intermittently, but she sees a doctor for this. She is hard of hearing and wears glasses at her baseline.  She reports some dysphagia and improved appetite since admission due to the quality of food. She is noted to have a dry cough, but denies a cough when asked. She admits to dyspnea on exertion as well as diarrhea. She denies chest pain. She also admits to some leg swelling but denies pain.    Inpatient consult to Gerontology  Consult performed by: Sowmya Bowman MD  Consult ordered by: Freddy Long MD          Review of Systems   Constitutional:  Positive for fatigue. Negative for activity change, appetite change, chills, diaphoresis, fever and unexpected weight change.   HENT:  Positive for trouble swallowing. Negative for congestion, ear discharge, ear pain, hearing loss, nosebleeds, postnasal drip, rhinorrhea, sneezing and sore throat.         Ruby. Dysphagia at baseline.   Eyes:  Positive for discharge. Negative for pain, redness, itching and visual disturbance.        Left eye discharge and burning.   Respiratory:  Positive for shortness of breath. Negative for cough, choking, chest tightness, wheezing and stridor.         HESS.   Cardiovascular:  Positive for leg swelling. Negative for chest pain and palpitations.        B/L leg swelling.   Gastrointestinal:  Positive for diarrhea. Negative for abdominal distention, abdominal pain, blood in stool, constipation, nausea and vomiting.   Endocrine: Negative for cold intolerance and heat intolerance.   Genitourinary:  Negative for decreased urine volume, difficulty urinating, dysuria, frequency, hematuria and urgency.   Musculoskeletal:  Positive for gait problem. Negative for arthralgias, back pain, myalgias, neck pain and neck stiffness.   Skin:  Positive for rash. Negative for color change, pallor and wound.        Facial rash.    Neurological:  Positive for weakness and headaches. Negative for dizziness, syncope, speech difficulty and light-headedness.   Psychiatric/Behavioral:  Negative for agitation, behavioral problems, confusion, dysphoric mood, hallucinations and sleep disturbance. The patient is not nervous/anxious.    All other systems reviewed and are negative.      Historical Information   Past Medical History:   Diagnosis Date    Anxiety     Asthma     Atrial fibrillation (HCC)     Cardiac disease     CHF (congestive heart failure) (HCC)     Depression 2016    HLD (hyperlipidemia) 2016    HTN (hypertension), benign 2016    Hyperlipidemia     Hypertension     Hypothyroidism 2016    Stroke (HCC)      Past Surgical History:   Procedure Laterality Date    BREAST EXCISIONAL BIOPSY Right 2001    benign    TONSILLECTOMY      TOTAL ABDOMINAL HYSTERECTOMY Bilateral     age 54    TOTAL ABDOMINAL HYSTERECTOMY W/ BILATERAL SALPINGOOPHORECTOMY Bilateral     age 54     Social History   Social History     Substance and Sexual Activity   Alcohol Use No     Social History     Substance and Sexual Activity   Drug Use No     Social History     Tobacco Use   Smoking Status Former    Current packs/day: 0.00    Types: Cigarettes    Quit date:     Years since quittin.5   Smokeless Tobacco Never     Family History:   Family History   Problem Relation Age of Onset    No Known Problems Mother     No Known Problems Father     No Known Problems Sister     Stomach cancer Maternal Grandmother 69    No Known Problems Maternal Grandfather     No Known Problems Paternal Grandmother     No Known Problems Paternal Grandfather        Meds/Allergies   current meds:   Current Facility-Administered Medications   Medication Dose Route Frequency    acetaminophen (TYLENOL) tablet 650 mg  650 mg Oral Q6H PRN    perphenazine tablet 4 mg  4 mg Oral Daily    And    amitriptyline (ELAVIL) tablet 20 mg  20 mg Oral Daily    atorvastatin  (LIPITOR) tablet 40 mg  40 mg Oral QPM    calcium carbonate (TUMS) chewable tablet 1,000 mg  1,000 mg Oral Daily PRN    [START ON 7/9/2024] furosemide (LASIX) tablet 40 mg  40 mg Oral Daily    hydrALAZINE (APRESOLINE) injection 5 mg  5 mg Intravenous Q6H PRN    levothyroxine tablet 50 mcg  50 mcg Oral Early Morning    lisinopril (ZESTRIL) tablet 20 mg  20 mg Oral Daily    magnesium sulfate 2 g/50 mL IVPB (premix) 2 g  2 g Intravenous Once    metoprolol tartrate (LOPRESSOR) tablet 25 mg  25 mg Oral Q12H HYUN    nystatin (MYCOSTATIN) powder   Topical BID    ondansetron (ZOFRAN) injection 4 mg  4 mg Intravenous Q6H PRN    potassium chloride (Klor-Con M10) CR tablet 10 mEq  10 mEq Oral Daily    triamcinolone (KENALOG) 0.5 % cream   Topical BID    warfarin (COUMADIN) tablet 4 mg  4 mg Oral Once per day on Monday Tuesday Wednesday Thursday Friday     Home Medications (provided by [facility]):  Potassium 20 mg QD  Metformin  mg QAM  Levothyroxine 75 mcg QD  Carvedilol 6.25 mg BID  Atorvastati 40 mg QHS  Amitriptyline/Perphenazine 10 mg/2 mg BID  Fish Oil 1 capsule BID  Coumadin for INR 2-3 ( pt was taking 4 mg Mon- Fri)      Allergies   Allergen Reactions    Penicillins Dermatitis       Objective     Intake/Output Summary (Last 24 hours) at 7/8/2024 1213  Last data filed at 7/8/2024 1156  Gross per 24 hour   Intake 720 ml   Output 2600 ml   Net -1880 ml     Invasive Devices       Peripheral Intravenous Line  Duration             Peripheral IV 07/07/24 Right Antecubital 1 day                    Physical Exam  Vitals and nursing note reviewed.   Constitutional:       General: She is not in acute distress.     Appearance: Normal appearance. She is well-developed and overweight. She is not ill-appearing, toxic-appearing or diaphoretic.   HENT:      Head: Normocephalic and atraumatic.      Ears:      Comments: [place]     Mouth/Throat:      Mouth: Mucous membranes are dry.      Comments: Upper dentures  Eyes:       General: No scleral icterus.        Right eye: No discharge.         Left eye: No discharge.      Conjunctiva/sclera: Conjunctivae normal.   Cardiovascular:      Rate and Rhythm: Normal rate and regular rhythm.      Heart sounds: Normal heart sounds, S1 normal and S2 normal. No murmur heard.     No friction rub. No gallop. No S3 or S4 sounds.   Pulmonary:      Effort: Pulmonary effort is normal. No respiratory distress.      Breath sounds: Normal breath sounds. No stridor. No wheezing, rhonchi or rales.   Chest:      Chest wall: No tenderness.   Abdominal:      General: Bowel sounds are normal. There is no distension.      Palpations: Abdomen is soft. There is no mass.      Tenderness: There is no abdominal tenderness. There is no guarding or rebound.      Hernia: No hernia is present.   Musculoskeletal:         General: Swelling present. No tenderness, deformity or signs of injury.      Cervical back: Rigidity present. No tenderness.      Right lower leg: Edema present.      Left lower leg: Edema present.   Lymphadenopathy:      Cervical: No cervical adenopathy.   Skin:     General: Skin is warm and dry.      Capillary Refill: Capillary refill takes less than 2 seconds.      Coloration: Skin is not jaundiced or pale.      Findings: Rash present. No bruising, erythema or lesion.   Neurological:      Mental Status: She is alert and oriented to person, place, and time.      Sensory: No sensory deficit.      Motor: Weakness present.      Gait: Gait abnormal.      Comments: AAO x 3.   Psychiatric:         Mood and Affect: Mood normal.         Behavior: Behavior normal.       Lab Results:   I have personally reviewed pertinent lab results including the following:  -CBC, CMP, INR, TSH, Glucose.    I have personally reviewed the following imaging study reports in PACS:  -CXR, CTH.      Therapies:   PT: Recommends Level II Moderate Resource Intensity at discharge.  OT: Recommends Level II Moderate Resource Intensity at  discharge.    VTE Prophylaxis: Warfarin (Coumadin) + SCD.    Code Status: Level 1 - Full Code  Advance Directive and Living Will:      Power of :    POLST:      Family and Social Support: Stepdaughter, Shana.    Goals of Care: Return home to living independently.    Thank you for allowing me to participate in your patients' care. Please do not hesitate to call with any additional questions.  Sowmya Bowman MD

## 2024-07-08 NOTE — ASSESSMENT & PLAN NOTE
-Completed final dose of Ancef  on 7/8.  -Significant improvement in rash of bilateral extremities. Continues to deny tenderness to palpation.  -Continue to monitor rash and evaluate for symptoms.

## 2024-07-08 NOTE — ASSESSMENT & PLAN NOTE
"-Patient describes \"letting herself down\" in her garage and inability to get up due to weakness prior to her admission.  -She briefly admits to several insigificant stumbles over the years where she has not sustained injuries.  -Patient ambulates with a walker and cane at her baseline.  -Continue with fall precautions.  -Encourage adequate PO hydration.  -Continue to monitor orthostatic vital signs.  -Avoid hypotension and hypoglycemia.  -Continue PT/OT. Appreciate evaluation and recommendations.  "

## 2024-07-08 NOTE — ASSESSMENT & PLAN NOTE
Lab Results   Component Value Date    MG 1.9 07/08/2024    MG 1.8 (L) 07/07/2024    MG 2.1 07/04/2024     Plan:  Give IV Mg 2g now  Check Mg in the morning  Keep Mg > 2

## 2024-07-08 NOTE — ASSESSMENT & PLAN NOTE
-Potassium WNL at 4.5.  -Encourage adequate PO hydration.  -Continue to monitor CMP daily.  -Continue potassium supplementation as necessary.

## 2024-07-08 NOTE — ASSESSMENT & PLAN NOTE
-AAO x 3. Minicog 2/5.  -CTH revealed mild microangiopathic changes.  -Currently stable without behaviors noted.  -Continue to provide supportive care and reorient as needed.  -Patient is at high risk for delirium, will monitor closely and place on delirium precautions.  -Maintain sleep/wake cycle.  -Optimize pain regimen. Continue Tylenol 650 mg Q6H PRN.  -Continue to monitor for constipation and urinary retention and manage as needed.  -Continue to monitor B12/TSH. B12 WNL at 388. TSH WNL at 1.478.   -Encourage family to visit.  -Encourage to wear glasses and hearing aids while awake.  -Encourage adequate PO intake, assist with feeding if needed.  -Recommend a complete geriatric assessment as outpatient, supervision with medication management, and fit to drive test upon discharge.

## 2024-07-08 NOTE — ASSESSMENT & PLAN NOTE
-Rash continues on left side of face around cheek area. Appears dry.  -Continue to monitor rash and evaluate for symptoms.

## 2024-07-09 VITALS
WEIGHT: 167.55 LBS | RESPIRATION RATE: 16 BRPM | DIASTOLIC BLOOD PRESSURE: 93 MMHG | SYSTOLIC BLOOD PRESSURE: 159 MMHG | OXYGEN SATURATION: 89 % | HEART RATE: 64 BPM | HEIGHT: 65 IN | TEMPERATURE: 97.6 F | BODY MASS INDEX: 27.92 KG/M2

## 2024-07-09 PROBLEM — E87.6 HYPOKALEMIA: Status: RESOLVED | Noted: 2024-07-08 | Resolved: 2024-07-09

## 2024-07-09 PROBLEM — Z91.89 AT RISK FOR DELIRIUM: Status: RESOLVED | Noted: 2024-07-08 | Resolved: 2024-07-09

## 2024-07-09 LAB
ANION GAP SERPL CALCULATED.3IONS-SCNC: 6 MMOL/L (ref 4–13)
BASOPHILS # BLD AUTO: 0.07 THOUSANDS/ÂΜL (ref 0–0.1)
BASOPHILS NFR BLD AUTO: 1 % (ref 0–1)
BUN SERPL-MCNC: 14 MG/DL (ref 5–25)
CALCIUM SERPL-MCNC: 10 MG/DL (ref 8.4–10.2)
CHLORIDE SERPL-SCNC: 102 MMOL/L (ref 96–108)
CO2 SERPL-SCNC: 29 MMOL/L (ref 21–32)
CREAT SERPL-MCNC: 0.72 MG/DL (ref 0.6–1.3)
EOSINOPHIL # BLD AUTO: 0.18 THOUSAND/ÂΜL (ref 0–0.61)
EOSINOPHIL NFR BLD AUTO: 2 % (ref 0–6)
ERYTHROCYTE [DISTWIDTH] IN BLOOD BY AUTOMATED COUNT: 15.3 % (ref 11.6–15.1)
GFR SERPL CREATININE-BSD FRML MDRD: 78 ML/MIN/1.73SQ M
GLUCOSE SERPL-MCNC: 117 MG/DL (ref 65–140)
GLUCOSE SERPL-MCNC: 122 MG/DL (ref 65–140)
GLUCOSE SERPL-MCNC: 128 MG/DL (ref 65–140)
GLUCOSE SERPL-MCNC: 141 MG/DL (ref 65–140)
HCT VFR BLD AUTO: 42.8 % (ref 34.8–46.1)
HGB BLD-MCNC: 13.6 G/DL (ref 11.5–15.4)
IMM GRANULOCYTES # BLD AUTO: 0.03 THOUSAND/UL (ref 0–0.2)
IMM GRANULOCYTES NFR BLD AUTO: 0 % (ref 0–2)
INR PPP: 1.73 (ref 0.84–1.19)
INR PPP: 1.73 (ref 0.85–1.19)
LYMPHOCYTES # BLD AUTO: 1.2 THOUSANDS/ÂΜL (ref 0.6–4.47)
LYMPHOCYTES NFR BLD AUTO: 15 % (ref 14–44)
MAGNESIUM SERPL-MCNC: 2.1 MG/DL (ref 1.9–2.7)
MCH RBC QN AUTO: 29.2 PG (ref 26.8–34.3)
MCHC RBC AUTO-ENTMCNC: 31.8 G/DL (ref 31.4–37.4)
MCV RBC AUTO: 92 FL (ref 82–98)
MONOCYTES # BLD AUTO: 1.09 THOUSAND/ÂΜL (ref 0.17–1.22)
MONOCYTES NFR BLD AUTO: 13 % (ref 4–12)
NEUTROPHILS # BLD AUTO: 5.65 THOUSANDS/ÂΜL (ref 1.85–7.62)
NEUTS SEG NFR BLD AUTO: 69 % (ref 43–75)
NRBC BLD AUTO-RTO: 0 /100 WBCS
PLATELET # BLD AUTO: 225 THOUSANDS/UL (ref 149–390)
PMV BLD AUTO: 11.3 FL (ref 8.9–12.7)
POTASSIUM SERPL-SCNC: 4.5 MMOL/L (ref 3.5–5.3)
PROTHROMBIN TIME: 21 SECONDS (ref 11.6–14.5)
RBC # BLD AUTO: 4.66 MILLION/UL (ref 3.81–5.12)
SODIUM SERPL-SCNC: 137 MMOL/L (ref 135–147)
WBC # BLD AUTO: 8.22 THOUSAND/UL (ref 4.31–10.16)

## 2024-07-09 PROCEDURE — 99232 SBSQ HOSP IP/OBS MODERATE 35: CPT | Performed by: FAMILY MEDICINE

## 2024-07-09 PROCEDURE — 97129 THER IVNTJ 1ST 15 MIN: CPT

## 2024-07-09 PROCEDURE — 97167 OT EVAL HIGH COMPLEX 60 MIN: CPT

## 2024-07-09 PROCEDURE — 80048 BASIC METABOLIC PNL TOTAL CA: CPT

## 2024-07-09 PROCEDURE — 83735 ASSAY OF MAGNESIUM: CPT

## 2024-07-09 PROCEDURE — 85025 COMPLETE CBC W/AUTO DIFF WBC: CPT

## 2024-07-09 PROCEDURE — 85610 PROTHROMBIN TIME: CPT

## 2024-07-09 PROCEDURE — 82948 REAGENT STRIP/BLOOD GLUCOSE: CPT

## 2024-07-09 PROCEDURE — 99239 HOSP IP/OBS DSCHRG MGMT >30: CPT | Performed by: INTERNAL MEDICINE

## 2024-07-09 RX ORDER — CARVEDILOL 12.5 MG/1
12.5 TABLET ORAL 2 TIMES DAILY WITH MEALS
Qty: 60 TABLET | Refills: 0
Start: 2024-07-09 | End: 2024-08-08

## 2024-07-09 RX ORDER — METOPROLOL TARTRATE 50 MG/1
50 TABLET, FILM COATED ORAL EVERY 12 HOURS SCHEDULED
Status: DISCONTINUED | OUTPATIENT
Start: 2024-07-09 | End: 2024-07-09

## 2024-07-09 RX ORDER — AMITRIPTYLINE HYDROCHLORIDE 10 MG/1
10 TABLET, FILM COATED ORAL DAILY
Status: DISCONTINUED | OUTPATIENT
Start: 2024-07-09 | End: 2024-07-09 | Stop reason: HOSPADM

## 2024-07-09 RX ORDER — LISINOPRIL 30 MG/1
30 TABLET ORAL DAILY
Qty: 30 TABLET | Refills: 0
Start: 2024-07-10 | End: 2024-08-09

## 2024-07-09 RX ORDER — CARVEDILOL 12.5 MG/1
12.5 TABLET ORAL 2 TIMES DAILY WITH MEALS
Status: DISCONTINUED | OUTPATIENT
Start: 2024-07-09 | End: 2024-07-09 | Stop reason: HOSPADM

## 2024-07-09 RX ORDER — PERPHENAZINE 4 MG/1
4 TABLET ORAL DAILY
Status: DISCONTINUED | OUTPATIENT
Start: 2024-07-09 | End: 2024-07-09 | Stop reason: HOSPADM

## 2024-07-09 RX ORDER — FUROSEMIDE 40 MG/1
40 TABLET ORAL DAILY
Qty: 30 TABLET | Refills: 0
Start: 2024-07-10 | End: 2024-07-15

## 2024-07-09 RX ADMIN — METOPROLOL TARTRATE 50 MG: 50 TABLET, FILM COATED ORAL at 09:13

## 2024-07-09 RX ADMIN — NYSTATIN: 100000 POWDER TOPICAL at 09:14

## 2024-07-09 RX ADMIN — POLYETHYLENE GLYCOL 3350 17 G: 17 POWDER, FOR SOLUTION ORAL at 09:13

## 2024-07-09 RX ADMIN — POTASSIUM CHLORIDE 10 MEQ: 750 TABLET, EXTENDED RELEASE ORAL at 09:13

## 2024-07-09 RX ADMIN — LEVOTHYROXINE SODIUM 50 MCG: 50 TABLET ORAL at 05:01

## 2024-07-09 RX ADMIN — AMITRIPTYLINE HYDROCHLORIDE 10 MG: 10 TABLET, FILM COATED ORAL at 09:13

## 2024-07-09 RX ADMIN — PERPHENAZINE 4 MG: 2 TABLET, FILM COATED ORAL at 09:14

## 2024-07-09 RX ADMIN — LISINOPRIL 30 MG: 20 TABLET ORAL at 09:13

## 2024-07-09 RX ADMIN — TRIAMCINOLONE ACETONIDE: 5 CREAM TOPICAL at 09:14

## 2024-07-09 RX ADMIN — FUROSEMIDE 40 MG: 40 TABLET ORAL at 09:13

## 2024-07-09 NOTE — ASSESSMENT & PLAN NOTE
Lab Results   Component Value Date    MG 1.9 07/08/2024    MG 1.8 (L) 07/07/2024    MG 2.1 07/04/2024     Resolved

## 2024-07-09 NOTE — ASSESSMENT & PLAN NOTE
Lab Results   Component Value Date    INR 1.73 (H) 07/09/2024    INR 1.60 (H) 07/08/2024    INR 1.52 (H) 07/07/2024     Goal INR is 2 to 3 for Afib  Home med: Warfarin 4 mg M-F  Continues to have subtherapeutic INR during hospital stay  Given extra dose of Warfarin 4 mg on Saturday 7/6 and Sunday 7/7    Plan:  Continue Warfarin 4 mg Monday through Friday, home regimen  Repeat INR in 1 week

## 2024-07-09 NOTE — ASSESSMENT & PLAN NOTE
Home meds: Lopressor 50 mg BID, Warfarin 4 mg M-F  [id number] score 8  See plan under supratherapeutic INR

## 2024-07-09 NOTE — ASSESSMENT & PLAN NOTE
-INR 1.73. Subtherapeutic  -Continue to monitor INR.  -Continue Warfarin 4 mg QD.  -Titrate Warfarin dose to maintain INR 2-3.  -Goal INR is 2-3.

## 2024-07-09 NOTE — ASSESSMENT & PLAN NOTE
Home meds: Lisinopril 20 mg, Lopressor 50 mg BID, Lasix 20 mg  Blood pressure inadequately controlled at this time    Plan:  Discharge home on lisinopril 30 mg, Coreg 12.5 mg BID, and Lasix 40 mg

## 2024-07-09 NOTE — DISCHARGE SUMMARY
[facility]  Discharge- Nola Welch 1940, 84 y.o. female MRN: [id number]  Unit/Bed#: W -01 Encounter: [id number]  Primary Care Provider: Kelsey King MD   Date and time admitted to hospital: 7/3/2024  1:57 PM    * Acute combined systolic and diastolic congestive heart failure (HCC)  Assessment & Plan  Presents with SOB, leg swelling, and Lasix noncompliance  New oxygen demand of 2 L O2 (baseline is RA)      CXR (7/3) - mild pulmonary venous congestion. Pulmonary artery enlargement, which can be seen with pulmonary HTN  Home meds: Lasix 20 mg, Lisinopril 20 mg, Lopressor 50 mg BID, KDur 10 mEq  Echo (6/13/23) - EF 60-65%. Indeterminate diastolic function due to Afib. LA moderately dilated. Estimated PASP 42.0 mmHg  Echo (7/8) - EF 40%. Mild global hypokinesis. LA and RA are moderately dilated. Aortic valve sclerosis. Moderate to severe MR and TR. Estimated RVSP is severely elevated at 77.00 mmHg. Trivial pericardial effusion  Diuresed well with IV Lasix 40 mg BID during hospital stay. Transitioned to PO Lasix 40 mg on 7/9    Plan:  Discharge home on lisinopril 30 mg, Coreg 12.5 mg BID, and Lasix 40 mg  Follow-up with cardiology as outpatient.  Spoke with sister over the phone, she would like referral for [facility] cardiology    Subtherapeutic INR  Assessment & Plan  Lab Results   Component Value Date    INR 1.73 (H) 07/09/2024    INR 1.60 (H) 07/08/2024    INR 1.52 (H) 07/07/2024     Goal INR is 2 to 3 for Afib  Home med: Warfarin 4 mg M-F  Continues to have subtherapeutic INR during hospital stay  Given extra dose of Warfarin 4 mg on Saturday 7/6 and Sunday 7/7    Plan:  Continue Warfarin 4 mg Monday through Friday, home regimen  Repeat INR in 1 week    Fall  Assessment & Plan  Patient reports that she felt weak, fell, and was found by neighbors a couple of hours later  Denies head strike or LOC  Lives at home alone, unable to perform ADLs now  [facility] (7/3) - no acute  intracranial abnormality    Plan:  PT recommended Level II. Rehab placement once medically cleared.  Discharge to [facility]    Erysipelas of both lower extremities  Assessment & Plan  To face and bilateral lower extremities  Previously seen at [facility] ED on 7/2 for similar complaint. Discharged home on Keflex, however, she was not compliant  Completed 5 day course of IV Ancef on 7/8. Significant improvement in erythema    Atrial fibrillation (HCC)  Assessment & Plan  Home meds: Lopressor 50 mg BID, Warfarin 4 mg M-F  [id number] score 8  See plan under supratherapeutic INR    Cognitive impairment  Assessment & Plan  A&Ox3  Geriatrics consulted, appreciate recommendations    Plan:  Will give referral for outpatient geriatrics    Hypomagnesemia  Assessment & Plan  Lab Results   Component Value Date    MG 2.1 07/09/2024    MG 1.9 07/08/2024    MG 1.8 (L) 07/07/2024     Resolved    Hypertension  Assessment & Plan  Home meds: Lisinopril 20 mg, Lopressor 50 mg BID, Lasix 20 mg  Blood pressure inadequately controlled at this time    Plan:  Discharge home on lisinopril 30 mg, Coreg 12.5 mg BID, and Lasix 40 mg    HLD (hyperlipidemia)  Assessment & Plan  Continue home atorvastatin 40 mg    Hypothyroidism  Assessment & Plan  TSH 1.478  Continue home levothyroxine 50 mcg    Depression  Assessment & Plan  Continue home Perphenazine-amitriptyline 2-10 mg 2 tablets upon discharge      Medical Problems       Resolved Problems  Date Reviewed: 7/9/2024            Resolved    Hypercalcemia 7/6/2024     Resolved by  Toñito Moise MD    Total bilirubin, elevated 7/6/2024     Resolved by  Toñito Moise MD    At risk for delirium 7/9/2024     Resolved by  Aleta Chua DO        Discharging Resident: Aleta Chua DO  Discharging Attending: Kiersten Wilkerson MD  PCP: Kelsey King MD  Admission Date:   Admission Orders (From admission, onward)       Ordered        07/03/24 1856  INPATIENT ADMISSION  Once                           Discharge Date: 07/09/24    Consultations During Hospital Stay:  Geriatrics    Procedures Performed:   None    Significant Findings / Test Results:   CTH (7/3) - no acute intracranial abnormality  CXR (7/4) - mild pulmonary venous congestion. Pulmonary artery enlargement which can be seen with pulmonary HTN  Echo (7/8) - EF 40%. Mild global hypokinesis. Estimated RVSP is severely elevated at 77.00 mmHg    Incidental Findings:   None    Test Results Pending at Discharge (will require follow up):  None     Outpatient Tests Requested:  BMP in 1 week  INR in 1 week    Complications:  None    Reason for Admission: Generalized weakness, confusion, and fall    Hospital Course:   Nola Welch is a 84 y.o. female patient who originally presented to the hospital on 7/3/2024 due to generalized weakness, confusion, and fall at home.  CTH did not show any acute intracranial abnormality.  She did present with subtherapeutic INR, 1.66 on arrival.  She received 2 days of extra doses of warfarin 4 mg on 7/6 and 7/7. She was also found to be in acute CHF exacerbation secondary to diuretic noncompliance.  She had fluid overload on examination, , and chest x-ray with mild pulmonary venous congestion.  She was diuresed with IV Lasix 40 mg twice daily with good urine output.  She was transitioned to Lasix p.o. 40 mg on the day of discharge.  Her home Lopressor 50 mg twice daily was also switched to Coreg 12.5 mg twice daily.  She had an echocardiogram on 7/8, which showed reduced ejection fraction of 40%, mild global hypokinesis, estimated right ventricular systolic pressure severely elevated at 77 mmHg.  Given persistently elevated blood pressures during hospital stay, lisinopril 20 mg dose was increased to lisinopril 30 mg daily.  She also had erysipelas of face and bilateral lower extremities, for which she finished 5-day course of IV Ancef with resolution.  During stay, geriatrics was consulted.  They  "recommended to complete geriatric assessment as outpatient; advised against driving restriction, they faxed [employer] form.  Plan for outpatient follow-up with primary care physician within 1 week.  Repeat BMP and INR check in 1 week.  Spoke with sister over the phone, she stated that patient follows with [facility] cardiology; however, she would prefer to follow-up with [facility] cardiology.  Will give referral for [facility] cardiology on discharge.  Will give referral for [facility] geriatrics on discharge.  Stable for discharge.      Please see above list of diagnoses and related plan for additional information.     Condition at Discharge: stable    Discharge Day Visit / Exam:   Subjective: Overnight, patient was persistently hypertensive.  Patient was seen and examined at bedside.  She denies chest pain, difficulty breathing, palpitations, leg swelling.  She states that she agrees with plan to decrease amitriptyline dose.  However, she is not amenable to starting Lexapro at this time.  She agrees with plan for discharge later this afternoon to [facility].    Vitals: Blood Pressure: 159/93 (07/09/24 1336)  Pulse: 64 (07/09/24 1336)  Temperature: 97.6 °F (36.4 °C) (07/09/24 0726)  Temp Source: Oral (07/06/24 2259)  Respirations: 16 (07/08/24 0736)  Height: 5' 4.5\" (163.8 cm) (07/08/24 1300)  Weight - Scale: 76 kg (167 lb 8.8 oz) (07/09/24 0600)  SpO2: (!) 89 % (07/09/24 1336)  Exam:   Physical Exam  Vitals reviewed.   Constitutional:       General: She is not in acute distress.     Appearance: Normal appearance. She is not toxic-appearing.   HENT:      Head: Normocephalic and atraumatic.      Mouth/Throat:      Mouth: Mucous membranes are moist.      Pharynx: Oropharynx is clear.   Eyes:      Conjunctiva/sclera: Conjunctivae normal.   Cardiovascular:      Rate and Rhythm: Normal rate. Rhythm irregular.   Pulmonary:      Effort: Pulmonary effort is normal. No respiratory distress.      Breath sounds: " Normal breath sounds. No wheezing or rales.   Abdominal:      General: Bowel sounds are normal. There is no distension.      Palpations: Abdomen is soft. There is no mass.      Tenderness: There is no abdominal tenderness. There is no guarding or rebound.   Musculoskeletal:         General: Normal range of motion.      Cervical back: Normal range of motion and neck supple. No tenderness.      Right lower leg: No edema.      Left lower leg: No edema.   Skin:     General: Skin is warm.      Capillary Refill: Capillary refill takes less than 2 seconds.      Findings: No rash.   Neurological:      Mental Status: She is alert and oriented to person, place, and time. Mental status is at baseline.      Cranial Nerves: No cranial nerve deficit.      Sensory: No sensory deficit.   Psychiatric:         Mood and Affect: Mood normal.        Discussion with Family: Updated  (sister) via phone.    Discharge instructions/Information to patient and family:   See after visit summary for information provided to patient and family.      Provisions for Follow-Up Care:  See after visit summary for information related to follow-up care and any pertinent home health orders.      Mobility at time of Discharge:   Basic Mobility Inpatient Raw Score: 18  JH-HLM Goal: 6: Walk 10 steps or more  JH-HLM Achieved: 6: Walk 10 steps or more  HLM Goal achieved. Continue to encourage appropriate mobility.     Disposition:   Acute Rehab at [facility]    Planned Readmission: No    Discharge Medications:  See after visit summary for reconciled discharge medications provided to patient and/or family.      **Please Note: This note may have been constructed using a voice recognition system**

## 2024-07-09 NOTE — ASSESSMENT & PLAN NOTE
Patient reports that she felt weak, fell, and was found by neighbors a couple of hours later  Denies head strike or LOC  Lives at home alone, unable to perform ADLs now  CTH (7/3) - no acute intracranial abnormality    Plan:  PT recommended Level II. Rehab placement once medically cleared.  Discharge to [facility]

## 2024-07-09 NOTE — ASSESSMENT & PLAN NOTE
Home meds: Lopressor 50 mg BID, Warfarin 4 mg M-F  [id number] score 8  See plan under supratherapeutic INR

## 2024-07-09 NOTE — ASSESSMENT & PLAN NOTE
Lab Results   Component Value Date    HGBA1C 6.4 (H) 12/07/2023    HGBA1C 6.7 (H) 05/11/2023    HGBA1C 7.0 (H) 08/23/2022     Not on any antihyperglycemic agents at home

## 2024-07-09 NOTE — ASSESSMENT & PLAN NOTE
Lab Results   Component Value Date    K 3.3 (L) 07/08/2024    K 3.0 (L) 07/08/2024    K 3.4 (L) 07/07/2024     Continue home med KDur 10 mEq  Repeat BMP in 1 week

## 2024-07-09 NOTE — ASSESSMENT & PLAN NOTE
Lab Results   Component Value Date    MG 2.1 07/09/2024    MG 1.9 07/08/2024    MG 1.8 (L) 07/07/2024     Resolved

## 2024-07-09 NOTE — ASSESSMENT & PLAN NOTE
To face and bilateral lower extremities  Previously seen at [facility] ED on 7/2 for similar complaint. Discharged home on Keflex, however, she was not compliant  Completed 5 day course of IV Ancef on 7/8. Significant improvement in erythema

## 2024-07-09 NOTE — PLAN OF CARE
Problem: OCCUPATIONAL THERAPY ADULT  Goal: Performs self-care activities at highest level of function for planned discharge setting.  See evaluation for individualized goals.  Description: Treatment Interventions: ADL retraining, Functional transfer training, Endurance training, Cognitive reorientation, Patient/family training, Equipment evaluation/education, Compensatory technique education          See flowsheet documentation for full assessment, interventions and recommendations.   Note: Limitation: Decreased ADL status, Decreased UE strength, Decreased Safe judgement during ADL, Decreased cognition, Decreased endurance, Decreased self-care trans, Decreased high-level ADLs (balance, fxnl mobility, act melissa, fxnl reach, standing melissa, strength, fxnl sitting balance, fxnl sitting melissa, attention to task, direction following, safety awareness, insight, pacing, sequencing, orientation, problem solving)  Prognosis: Good  Assessment: Pt is a 84 y.o. female seen for OT evaluation s/p admission to [facility] on 7/3/2024 due to fall. Diagnosed with Acute combined systolic and diastolic congestive heart failure (HCC). Personal and env factors supporting pt at time of IE include (I) PLOF, accessible home environment, and FFSU. Personal and env factors inhibiting engagement in occupations include advanced age, limited social support, and lives alone, no local social support . Performance deficits that affect the pt’s occupational performance can be seen above. Due to pt's current functional limitations and medical complications pt is functioning below baseline. Pt would benefit from continued skilled OT treatment in order to maximize safety, independence and overall performance with ADLs, functional mobility, functional transfers, and cognition in order to achieve highest level of function.     Rehab Resource Intensity Level, OT: II (Moderate Resource Intensity) (followed by d/c to environment with 24/7 care)

## 2024-07-09 NOTE — ASSESSMENT & PLAN NOTE
Patient reports that she felt weak, fell, and was found by neighbors a couple of hours later  Denies head strike or LOC  Lives at home alone, unable to perform ADLs now  CTH (7/3) - no acute intracranial abnormality    Plan:  PT recommended Level II. Rehab placement once medically cleared.  Discharge to [facility]

## 2024-07-09 NOTE — DISCHARGE INSTR - AVS FIRST PAGE
Dear Nola Welch,     It was our pleasure to care for you here at [facility].  It is our hope that we were always able to exceed the expected standards for your care during your stay.  You were hospitalized due to fall, low INR level, acute exacerbation of congestive heart failure.  You were cared for on the 4th floor by Aleta Chua DO under the service of Kiersten Wilkerson MD with the [facility] who covers for your primary care physician (PCP), Kelsey King MD, while you were hospitalized.  If you have any questions or concerns related to this hospitalization, you may contact us at .  For follow up as well as any medication refills, we recommend that you follow up with your primary care physician.  A registered nurse will reach out to you by phone within a few days after your discharge to answer any additional questions that you may have after going home.  However, at this time we provide for you here, the most important instructions / recommendations at discharge:     Notable Medication Adjustments -   Start taking Lasix 40 mg daily.  Start taking Lisinopril 30 mg daily.  Start taking Coreg (carvedilol) 12.5 mg two times per day.  Stop taking Lopressor (metoprolol)  Testing Required after Discharge -   Check BMP in 1 week  Check INR in 1 week.  ** Please contact your PCP to request testing orders for any of the testing recommended here **  Important follow up information -   Follow-up with your primary care physician within 1 week.  Follow-up with your cardiologist soon as possible.   We have also given you a referral for cardiology with [facility] should you decide to follow-up with them instead.  Please call [phone] and schedule an appointment.  We have given you a referral for geriatrics.  Somebody from the office will call to schedule an appointment.  If you do not hear back within the next couple days, please call  [phone] and follow-up.  Other Instructions -   The ED if you have chest pain, difficulty breathing, generalized weakness, confusion, rash, fall, fevers, chills, abdominal pain, intractable nausea and vomiting, or any other symptoms concerning to you.  Please review this entire after visit summary as additional general instructions including medication list, appointments, activity, diet, any pertinent wound care, and other additional recommendations from your care team that may be provided for you.      Sincerely,     Aleta Chua, DO

## 2024-07-09 NOTE — CASE MANAGEMENT
Case Management Discharge Planning Note    Patient name Nola Welch  Location W /W -01 MRN [id number]  : 1940 Date 2024       Current Admission Date: 7/3/2024  Current Admission Diagnosis:Acute combined systolic and diastolic congestive heart failure (HCC)   Patient Active Problem List    Diagnosis Date Noted Date Diagnosed    Cognitive impairment 2024     At risk for delirium 2024     Hypokalemia 2024     Hypomagnesemia 2024     Supratherapeutic INR 2024     Type 2 diabetes mellitus (HCC) 2024     Erysipelas of both lower extremities 2024     Acute combined systolic and diastolic congestive heart failure (HCC) 2024     Fall 2023     Scalp hematoma, initial encounter 2023     Atrial fibrillation (HCC) 2016     Acute respiratory failure with hypoxia (HCC) 2016     Depression 2016     Hypertension 2016     Hypothyroidism 2016     HLD (hyperlipidemia) 2016       LOS (days): 6  Geometric Mean LOS (GMLOS) (days):   Days to GMLOS:     OBJECTIVE:  Risk of Unplanned Readmission Score: 17.43         Current admission status: Inpatient   Preferred Pharmacy:   [facility]/pharmacy #[id number] - [place], [place] - [address]  [address] [address]  Phone: [phone] Fax: [phone]    Primary Care Provider: Kelsey King MD    Primary Insurance: MEDICARE  Secondary Insurance: [employer]    DISCHARGE DETAILS:    Discharge planning discussed with:: Patient     Comments - Freedom of Choice: Patient ready for DC today. Bed confirmed at [facility]. Attending and RN aware. WCV ordered for 3pm. IMM reviewed with patient and signed  CM contacted family/caregiver?: Yes  Were Treatment Team discharge recommendations reviewed with patient/caregiver?: Yes  Did patient/caregiver verbalize understanding of patient care needs?: Yes  Were patient/caregiver advised of the risks associated with not  following Treatment Team discharge recommendations?: Yes    Contacts  Patient Contacts: Mariam Salazar  Relationship to Patient:: Family  Contact Method: Phone  Phone Number: [phone]  Reason/Outcome: Discharge Planning              Other Referral/Resources/Interventions Provided:  Interventions: Short Term Rehab  Referral Comments: Patient going to [facility]. Bed confirmed for today         Treatment Team Recommendation: Short Term Rehab  Discharge Destination Plan:: Short Term Rehab  Transport at Discharge : Wheelchair van                             IMM Given (Date):: 07/09/24  IMM Given to:: Patient  IMM reviewed with patient, patient agrees with discharge determination.         Accepting Facility Name, City & State : [facility]  Receiving Facility/Agency Phone Number: [phone] ext [phone]  Facility/Agency Fax Number: [phone]

## 2024-07-09 NOTE — SPEECH THERAPY NOTE
Speech Language/Pathology    Speech/Language Pathology Note    Patient Name: Nola Welch  Today's Date: 7/9/2024    Defer cognition deficits to OT, pending OT eval. Consider OP ST for f/u. No further f/u from IP ST recommended.    Merline Bustamante MS, CCC-SLP  Speech Pathologist  Available via [employer]

## 2024-07-09 NOTE — ASSESSMENT & PLAN NOTE
Presents with SOB, leg swelling, and Lasix noncompliance  New oxygen demand of 2 L O2 (baseline is RA)      CXR (7/3) - mild pulmonary venous congestion. Pulmonary artery enlargement, which can be seen with pulmonary HTN  Home meds: Lasix 20 mg, Lisinopril 20 mg, Lopressor 50 mg BID, KDur 10 mEq  Echo (6/13/23) - EF 60-65%. Indeterminate diastolic function due to Afib. LA moderately dilated. Estimated PASP 42.0 mmHg  Echo (7/8) - EF 40%. Mild global hypokinesis. LA and RA are moderately dilated. Aortic valve sclerosis. Moderate to severe MR and TR. Estimated RVSP is severely elevated at 77.00 mmHg. Trivial pericardial effusion  Diuresed well with IV Lasix 40 mg BID during hospital stay. Transitioned to PO Lasix 40 mg on 7/9    Plan:  Discharge home on lisinopril 30 mg, Coreg 12.5 mg BID, and Lasix 40 mg  Follow-up with cardiology as outpatient.  Spoke with sister over the phone, she would like referral for [facility] cardiology

## 2024-07-09 NOTE — PLAN OF CARE
Problem: PAIN - ADULT  Goal: Verbalizes/displays adequate comfort level or baseline comfort level  Description: Interventions:  - Encourage patient to monitor pain and request assistance  - Assess pain using appropriate pain scale  - Administer analgesics based on type and severity of pain and evaluate response  - Implement non-pharmacological measures as appropriate and evaluate response  - Consider cultural and social influences on pain and pain management  - Notify physician/advanced practitioner if interventions unsuccessful or patient reports new pain  Outcome: Progressing     Problem: INFECTION - ADULT  Goal: Absence or prevention of progression during hospitalization  Description: INTERVENTIONS:  - Assess and monitor for signs and symptoms of infection  - Monitor lab/diagnostic results  - Monitor all insertion sites, i.e. indwelling lines, tubes, and drains  - Monitor endotracheal if appropriate and nasal secretions for changes in amount and color  - [place] appropriate cooling/warming therapies per order  - Administer medications as ordered  - Instruct and encourage patient and family to use good hand hygiene technique  - Identify and instruct in appropriate isolation precautions for identified infection/condition  Outcome: Progressing  Goal: Absence of fever/infection during neutropenic period  Description: INTERVENTIONS:  - Monitor WBC    Outcome: Progressing     Problem: SAFETY ADULT  Goal: Patient will remain free of falls  Description: INTERVENTIONS:  - Educate patient/family on patient safety including physical limitations  - Instruct patient to call for assistance with activity   - Consult OT/PT to assist with strengthening/mobility   - Keep Call bell within reach  - Keep bed low and locked with side rails adjusted as appropriate  - Keep care items and personal belongings within reach  - Initiate and maintain comfort rounds  - Make Fall Risk Sign visible to staff  - Offer Toileting every  Hours,  in advance of need  - Initiate/Maintain alarm  - Obtain necessary fall risk management equipment:   - Apply yellow socks and bracelet for high fall risk patients  - Consider moving patient to room near nurses station  Outcome: Progressing  Goal: Maintain or return to baseline ADL function  Description: INTERVENTIONS:  -  Assess patient's ability to carry out ADLs; assess patient's baseline for ADL function and identify physical deficits which impact ability to perform ADLs (bathing, care of mouth/teeth, toileting, grooming, dressing, etc.)  - Assess/evaluate cause of self-care deficits   - Assess range of motion  - Assess patient's mobility; develop plan if impaired  - Assess patient's need for assistive devices and provide as appropriate  - Encourage maximum independence but intervene and supervise when necessary  - Involve family in performance of ADLs  - Assess for home care needs following discharge   - Consider OT consult to assist with ADL evaluation and planning for discharge  - Provide patient education as appropriate  Outcome: Progressing  Goal: Maintains/Returns to pre admission functional level  Description: INTERVENTIONS:  - Perform AM-PAC 6 Click Basic Mobility/ Daily Activity assessment daily.  - Set and communicate daily mobility goal to care team and patient/family/caregiver.   - Collaborate with rehabilitation services on mobility goals if consulted  - Perform Range of Motion  times a day.  - Reposition patient every  hours.  - Dangle patient  times a day  - Stand patient  times a day  - Ambulate patient  times a day  - Out of bed to chair  times a day   - Out of bed for meals  times a day  - Out of bed for toileting  - Record patient progress and toleration of activity level   Outcome: Progressing     Problem: DISCHARGE PLANNING  Goal: Discharge to home or other facility with appropriate resources  Description: INTERVENTIONS:  - Identify barriers to discharge w/patient and caregiver  - Arrange for  needed discharge resources and transportation as appropriate  - Identify discharge learning needs (meds, wound care, etc.)  - Arrange for interpretive services to assist at discharge as needed  - Refer to Case Management Department for coordinating discharge planning if the patient needs post-hospital services based on physician/advanced practitioner order or complex needs related to functional status, cognitive ability, or social support system  Outcome: Progressing     Problem: Knowledge Deficit  Goal: Patient/family/caregiver demonstrates understanding of disease process, treatment plan, medications, and discharge instructions  Description: Complete learning assessment and assess knowledge base.  Interventions:  - Provide teaching at level of understanding  - Provide teaching via preferred learning methods  Outcome: Progressing     Problem: Prexisting or High Potential for Compromised Skin Integrity  Goal: Skin integrity is maintained or improved  Description: INTERVENTIONS:  - Identify patients at risk for skin breakdown  - Assess and monitor skin integrity  - Assess and monitor nutrition and hydration status  - Monitor labs   - Assess for incontinence   - Turn and reposition patient  - Assist with mobility/ambulation  - Relieve pressure over bony prominences  - Avoid friction and shearing  - Provide appropriate hygiene as needed including keeping skin clean and dry  - Evaluate need for skin moisturizer/barrier cream  - Collaborate with interdisciplinary team   - Patient/family teaching  - Consider wound care consult   Outcome: Progressing     Problem: Nutrition/Hydration-ADULT  Goal: Nutrient/Hydration intake appropriate for improving, restoring or maintaining nutritional needs  Description: Monitor and assess patient's nutrition/hydration status for malnutrition. Collaborate with interdisciplinary team and initiate plan and interventions as ordered.  Monitor patient's weight and dietary intake as ordered or  per policy. Utilize nutrition screening tool and intervene as necessary. Determine patient's food preferences and provide high-protein, high-caloric foods as appropriate.     INTERVENTIONS:  - Monitor oral intake, urinary output, labs, and treatment plans  - Assess nutrition and hydration status and recommend course of action  - Evaluate amount of meals eaten  - Assist patient with eating if necessary   - Allow adequate time for meals  - Recommend/ encourage appropriate diets, oral nutritional supplements, and vitamin/mineral supplements  - Order, calculate, and assess calorie counts as needed  - Recommend, monitor, and adjust tube feedings and TPN/PPN based on assessed needs  - Assess need for intravenous fluids  - Provide specific nutrition/hydration education as appropriate  - Include patient/family/caregiver in decisions related to nutrition  Outcome: Progressing     Problem: Potential for Falls  Goal: Patient will remain free of falls  Description: INTERVENTIONS:  - Educate patient/family on patient safety including physical limitations  - Instruct patient to call for assistance with activity   - Consult OT/PT to assist with strengthening/mobility   - Keep Call bell within reach  - Keep bed low and locked with side rails adjusted as appropriate  - Keep care items and personal belongings within reach  - Initiate and maintain comfort rounds  - Make Fall Risk Sign visible to staff  - Offer Toileting every  Hours, in advance of need  - Initiate/Maintain alarm  - Obtain necessary fall risk management equipment:   - Apply yellow socks and bracelet for high fall risk patients  - Consider moving patient to room near nurses station  Outcome: Progressing

## 2024-07-09 NOTE — ASSESSMENT & PLAN NOTE
A&Ox3  Geriatrics consulted, appreciate recommendations    Plan:  Will give referral for outpatient geriatrics

## 2024-07-09 NOTE — ASSESSMENT & PLAN NOTE
To face and bilateral lower extremities  Previously seen at [facility] ED on 7/2 for similar complaint. Discharged home on Keflex, however, she was not compliant  Completed 5 day course of IV Ancef on 7/8. Significant improvement in erythema

## 2024-07-09 NOTE — PROGRESS NOTES
Progress Note - Geriatric Medicine   Nola ELVIS Welch 84 y.o. female MRN: [id number]  Unit/Bed#: W -01 Encounter: [id number]      Assessment/Plan:  Supratherapeutic INR  Assessment & Plan  -INR 1.73. Subtherapeutic  -Continue to monitor INR.  -Continue Warfarin 4 mg QD.  -Titrate Warfarin dose to maintain INR 2-3.  -Goal INR is 2-3.    At risk for delirium  Assessment & Plan  -Patient is high risk of delirium due to acute exacerbation of CHF, erysipelas of LE, nontherapeutic INR, mild hypokalemia, prolonged QTC, hypertension, change in environment, age.  -Continue with delirium precautions.  -Maintain normal sleep/wake cycle.  -Minimize overnight interruptions, group overnight vitals/labs/nursing checks as possible.  -Dim lights, close blinds, and turn off TV to minimize stimulation and encourage sleep environment in evenings.  -Ensure that pain is well controlled. Continue Tylenol 650 mg Q6H PRN.  -Continue to monitor for fecal and urinary retention which may precipitate delirium.  -Encourage early mobilization and ambulation.  -Provide frequent reorientation and redirection.  -Encourage family and friends at the bedside to help calm patient if anxious.  -Avoid medications which may precipitate or worsen delirium such as tramadol, benzodiazepine, anticholinergics, and antihistaminics.  -Encourage hydration and nutrition, assist with feeding if needed.  -Redirect unwanted behaviors as first line, avoid physical restraints.    Cognitive impairment  Assessment & Plan  -AAO x 3. Minicog 2/5.  -CTH revealed mild microangiopathic changes.  -Currently stable without behaviors noted.  -Continue to provide supportive care and reorient as needed.  -Patient is at high risk for delirium, will monitor closely and place on delirium precautions.  -Maintain sleep/wake cycle.  -Optimize pain regimen. Continue Tylenol 650 mg Q6H PRN.  -Continue to monitor for constipation and urinary retention and manage as needed.  -Continue to  "monitor B12/TSH. B12 WNL at 388. TSH WNL at 1.478.   -Encourage family to visit.  -Encourage to wear glasses and hearing aids while awake.  -Encourage adequate PO intake, assist with feeding if needed.  -Recommend a complete geriatric assessment as outpatient, supervision with medication management, and fit to drive test upon discharge.    Erysipelas of both lower extremities  Assessment & Plan  -Completed final dose of Ancef  on 7/8.  -Significant improvement in rash of bilateral extremities. Continues to deny tenderness to palpation.  -Continue to monitor rash and evaluate for symptoms.    Fall  Assessment & Plan  -Patient describes \"letting herself down\" in her garage and inability to get up due to weakness prior to her admission.  -She briefly admits to several insigificant stumbles over the years where she has not sustained injuries.  -Patient ambulates with a walker and cane at her baseline.  -Continue with fall precautions.  -Encourage adequate PO hydration.  -Continue to monitor orthostatic vital signs.  -Avoid hypotension and hypoglycemia.  -Continue PT/OT. Appreciate evaluation and recommendations.    Hypothyroidism  Assessment & Plan  -TSH WNL at 1.478.  -Continue to monitor TSH.  -Continue Levothyroxine 50 mcg QAM.    Hypertension  Assessment & Plan  -BP continues to be uncontrolled at 168/103 this AM.  -Continue to monitor blood pressure.  -Continue to monitor orthostatic vital signs.  -Encourage adequate PO hydration.  -Reduce salt intake. Continue sodium restriction of 2g/day.  -Avoid hypotension.  -Continue Lasix 40 mg PO QD.  -Continue Hydralazine 5 mg IV Q6H PRN.  -Continue Lisinopril 20 mg QD.  -Continue Metoprolol tartate 25 mg Q12H.    Depression  Assessment & Plan  -Currently seems to be controlled.  -Recommend switching to a safer alternative for elderly.  - taper off amitriptyline and perphenazine  -Follow up with psychiatry as outpatient.    Atrial fibrillation (HCC)  Assessment & " Plan  -Currently rate controlled.  -INR 1.73. Subtherapeutic  -Continue to monitor INR.  -Continue Warfarin 4 mg QD.  -Titrate Warfarin dose to maintain INR 2-3.  -Goal INR is 2-3.    * Acute combined systolic and diastolic congestive heart failure (HCC)  Assessment & Plan  Wt Readings from Last 3 Encounters:   07/09/24 76 kg (167 lb 8.8 oz)   04/02/23 83.2 kg (183 lb 6.8 oz)   08/27/21 85.3 kg (188 lb)     -Bilateral leg swelling noted on PE. Patient reports improvement. Continued to report HESS.  -Continue Lasix 40 mg IV BID.  -Continue Hydralazine 5 mg IV Q6H PRN.  -Continue Metoprolol tartate 25 mg Q12.  -Continue Lisinopril 20 mg QD.  -Continue sodium restriction of 2g/day.  -Continue to monitor CMP.  -Continue to monitor I&Os.    Hypokalemia-resolved as of 7/9/2024  Assessment & Plan  -Potassium WNL at 4.5.  -Encourage adequate PO hydration.  -Continue to monitor CMP daily.  -Continue potassium supplementation as necessary.      Subjective:   Nola is an 84-year-old female that presents with an exacerbation of CHF. She is being seen for a geriatric follow-up. She reports she is eating, drinking, sleeping, urinating, and moving her bowels adequately. She does report loose stools She reports shortness of breath but denies cough. She denies chest pain. She denies being in any present pain. Nola reports being ready for discharge.    Review of Systems   Constitutional:  Negative for activity change, appetite change, chills, diaphoresis, fatigue and fever.   HENT:  Positive for hearing loss. Negative for congestion, ear discharge, ear pain, postnasal drip, rhinorrhea, sneezing, sore throat and trouble swallowing.    Eyes:  Negative for photophobia, pain, discharge, redness, itching and visual disturbance.   Respiratory:  Positive for shortness of breath. Negative for cough, choking, chest tightness, wheezing and stridor.    Cardiovascular:  Positive for leg swelling. Negative for chest pain and palpitations.  "       Bilateral LE swelling.   Gastrointestinal:  Positive for diarrhea. Negative for abdominal distention, abdominal pain, blood in stool, constipation, nausea and vomiting.   Endocrine: Negative for cold intolerance and heat intolerance.   Genitourinary:  Negative for decreased urine volume, difficulty urinating, dysuria, frequency, hematuria and urgency.   Musculoskeletal:  Positive for gait problem and neck stiffness. Negative for arthralgias, back pain, joint swelling and neck pain.   Skin:  Positive for rash. Negative for color change and wound.        On face.   Neurological:  Negative for dizziness, syncope, speech difficulty, weakness, light-headedness, numbness and headaches.   Psychiatric/Behavioral:  Negative for agitation, behavioral problems, confusion, decreased concentration, dysphoric mood and sleep disturbance. The patient is not nervous/anxious.    All other systems reviewed and are negative.        Objective:     Vitals: Blood pressure (!) 168/103, pulse 78, temperature 97.6 °F (36.4 °C), resp. rate 16, height 5' 4.5\" (1.638 m), weight 76 kg (167 lb 8.8 oz), SpO2 94%, not currently breastfeeding.,Body mass index is 28.32 kg/m².      Intake/Output Summary (Last 24 hours) at 7/9/2024 1323  Last data filed at 7/9/2024 1300  Gross per 24 hour   Intake 1080 ml   Output 400 ml   Net 680 ml       Current Medications: Reviewed    Physical Exam:   Physical Exam  Vitals and nursing note reviewed.   Constitutional:       General: She is awake. She is not in acute distress.     Appearance: Normal appearance. She is well-developed and overweight. She is not ill-appearing, toxic-appearing or diaphoretic.   HENT:      Head: Normocephalic and atraumatic.      Ears:      Comments: [place]     Mouth/Throat:      Mouth: Mucous membranes are dry.   Eyes:      General: No scleral icterus.        Right eye: No discharge.         Left eye: No discharge.      Conjunctiva/sclera: Conjunctivae normal.   Cardiovascular:      " Rate and Rhythm: Normal rate and regular rhythm.      Heart sounds: Normal heart sounds. No murmur heard.     No friction rub. No gallop.   Pulmonary:      Effort: Pulmonary effort is normal. No respiratory distress.      Breath sounds: Normal breath sounds. No stridor. No wheezing, rhonchi or rales.   Chest:      Chest wall: No tenderness.   Abdominal:      General: Bowel sounds are normal. There is no distension.      Palpations: Abdomen is soft. There is no mass.      Tenderness: There is no abdominal tenderness. There is no guarding or rebound.      Hernia: No hernia is present.   Musculoskeletal:         General: Swelling present. No tenderness, deformity or signs of injury.      Cervical back: No tenderness.      Right lower leg: Edema present.      Left lower leg: Edema present.   Lymphadenopathy:      Cervical: No cervical adenopathy.   Skin:     General: Skin is warm and dry.      Capillary Refill: Capillary refill takes less than 2 seconds.      Coloration: Skin is not jaundiced or pale.      Findings: Rash present. No bruising, erythema or lesion.   Neurological:      General: No focal deficit present.      Mental Status: She is alert and oriented to person, place, and time. Mental status is at baseline.      Sensory: No sensory deficit.      Motor: No weakness.      Gait: Gait abnormal.      Comments: AAO x 3.   Psychiatric:         Mood and Affect: Mood normal.         Behavior: Behavior normal. Behavior is cooperative.          Invasive Devices       Peripheral Intravenous Line  Duration             Peripheral IV 07/07/24 Right Antecubital 2 days                    Lab, Imaging and other studies: I have personally reviewed pertinent reports.

## 2024-07-09 NOTE — OCCUPATIONAL THERAPY NOTE
Occupational Therapy Evaluation + Cognitive Evaluation (ACLS)      Patient Name: Nola Welch  Today's Date: 7/9/2024  Problem List  Principal Problem:    Acute combined systolic and diastolic congestive heart failure (HCC)  Active Problems:    Atrial fibrillation (HCC)    Depression    Hypertension    Hypothyroidism    HLD (hyperlipidemia)    Fall    Erysipelas of both lower extremities    Cognitive impairment    At risk for delirium    Hypomagnesemia    Supratherapeutic INR    Type 2 diabetes mellitus (HCC)    Past Medical History  Past Medical History:   Diagnosis Date    Anxiety     Asthma     Atrial fibrillation (HCC)     Cardiac disease     CHF (congestive heart failure) (HCC)     Depression 5/22/2016    HLD (hyperlipidemia) 5/22/2016    HTN (hypertension), benign 5/22/2016    Hyperlipidemia     Hypertension     Hypothyroidism 5/22/2016    Stroke (HCC)     Type 2 diabetes mellitus (HCC) 7/8/2024     Past Surgical History  Past Surgical History:   Procedure Laterality Date    BREAST EXCISIONAL BIOPSY Right 2001    benign    TONSILLECTOMY      TOTAL ABDOMINAL HYSTERECTOMY Bilateral 1994    age 54    TOTAL ABDOMINAL HYSTERECTOMY W/ BILATERAL SALPINGOOPHORECTOMY Bilateral 1994    age 54             07/09/24 0825   OT Last Visit   OT Visit Date 07/09/24   Note Type   Note type Evaluation   Pain Assessment   Pain Assessment Tool 0-10   Pain Score No Pain   Restrictions/Precautions   Weight Bearing Precautions Per Order No   Other Precautions Cognitive;Impulsive;Chair Alarm;Bed Alarm;Fall Risk   Home Living   Type of Home House   Home Layout One level;Stairs to enter with rails  (3 WOLFGANG)   Bathroom Shower/Tub Tub/shower unit   Bathroom Toilet Standard   Bathroom Equipment Grab bars in shower;Shower chair;Grab bars around toilet   Bathroom Accessibility Accessible   Home Equipment Walker;Cane   Additional Comments Pt is a POOR historian, initially reporting use of RW at all times then later reports that she uses  "2 canes   Prior Function   Level of [place] Independent with ADLs   Lives With (S)  Alone   Receives Help From   (\"No one\" \"I'm thinking about it... I don't think so\")   IADLs Independent with driving;Independent with meal prep;Independent with medication management  (reports that she does have a service to mow the lawn)   Falls in the last 6 months 1 to 4  (\"not too many\")   Vocational Retired   Comments Pt reports that she was supposed to be at a wedding over the weekend and got lost driving there - was driving around until she finally figured out how to get home   Lifestyle   Autonomy PTA pt living alone in 1SH, pt (I) with ADLs and IADLs, (+)drives, use of RW vs SPC at baseline   Reciprocal Relationships (+)falks   Service to Others retired   Intrinsic Gratification \"sleep\"   General   Additional Pertinent History Admit due to fall. Recent episodes of forgetfulness. PMH of asthma, atrial fibrillation on warfarin, HFpEF, hypertension, hyperlipidemia, diabetes, hypothyroidism   Family/Caregiver Present No   Subjective   Subjective \"You know I fell out of the chair because I was trying to do a somersault\"   ADL   Eating Assistance 7  Independent   Grooming Assistance 5  Supervision/Setup   Grooming Deficit Increased time to complete;Supervision/safety;Verbal cueing;Brushing hair  (standing at sink)   UB Bathing Assistance 5  Supervision/Setup   LB Bathing Assistance 5  Supervision/Setup   UB Dressing Assistance 5  Supervision/Setup   LB Dressing Assistance 4  Minimal Assistance   LB Dressing Deficit Increased time to complete;Supervision/safety;Verbal cueing;Thread RLE into underwear;Thread LLE into underwear;Pull up over hips   Toileting Assistance  4  Minimal Assistance   Bed Mobility   Sit to Supine 5  Supervision   Additional items Increased time required;Impulsive;Verbal cues   Transfers   Sit to Stand 5  Supervision   Additional items Increased time required;Impulsive;Verbal cues   Stand to Sit 5  " "Supervision   Additional items Increased time required;Impulsive;Verbal cues   Additional Comments use of RW, max VC throughout due to impulsiveness   Functional Mobility   Functional Mobility 4  Minimal assistance   Additional Comments Ax1, functional household distance, x1 LOB requiring A for correction   Additional items Rolling walker   Balance   Static Sitting Good   Dynamic Sitting Fair +   Static Standing Fair -   Dynamic Standing Fair -   Ambulatory Poor +   Activity Tolerance   Activity Tolerance Patient limited by fatigue;Patient limited by pain   Medical Staff Made Aware JANINA Rizvi   RUE Assessment   RUE Assessment WFL   LUE Assessment   LUE Assessment WFL   Hand Function   Gross Motor Coordination Functional   Fine Motor Coordination Functional   Cognition   Overall Cognitive Status (S)  Impaired   Arousal/Participation Alert;Cooperative   Attention Attends with cues to redirect   Orientation Level Oriented to person;Oriented to time;Disoriented to place;Disoriented to situation  (States [employer], states that she locked herself in at home and now she has to change the locks)   Memory Decreased short term memory;Decreased recall of recent events;Decreased recall of precautions   Following Commands Follows one step commands with increased time or repetition   Comments Pt with poor insight into deficits, poor problem solving and limited awareness into situation. Pt believing that she is \"moving\" to STR - requiring edu on current situation   Assessment   Limitation Decreased ADL status;Decreased UE strength;Decreased Safe judgement during ADL;Decreased cognition;Decreased endurance;Decreased self-care trans;Decreased high-level ADLs  (balance, fxnl mobility, act melissa, fxnl reach, standing melissa, strength, fxnl sitting balance, fxnl sitting melissa, attention to task, direction following, safety awareness, insight, pacing, sequencing, orientation, problem solving)   Prognosis Good   Assessment Pt is a 84 " "y.o. female seen for OT evaluation s/p admission to [facility] on 7/3/[facility]24 due to fall. Diagnosed with Acute combined systolic and diastolic congestive heart failure (HCC). Personal and env factors supporting pt at time of IE include (I) PLOF, accessible home environment, and FFSU. Personal and env factors inhibiting engagement in occupations include advanced age, limited social support, and lives alone, no local social support . Performance deficits that affect the pt’s occupational performance can be seen above. Due to pt's current functional limitations and medical complications pt is functioning below baseline. Pt would benefit from continued skilled OT treatment in order to maximize safety, independence and overall performance with ADLs, functional mobility, functional transfers, and cognition in order to achieve highest level of function.   Goals   Patient Goals to \"sleep\"   LTG Time Frame 10-14   Long Term Goal see goals listed below   Plan   Treatment Interventions ADL retraining;Functional transfer training;Endurance training;Cognitive reorientation;Patient/family training;Equipment evaluation/education;Compensatory technique education   Goal Expiration Date 07/19/24   OT Treatment Day 1   OT Frequency 3-5x/wk   Discharge Recommendation   Rehab Resource Intensity Level, OT II (Moderate Resource Intensity)  (followed by d/c to environment with 24/7 care)   AM-PAC Daily Activity Inpatient   Lower Body Dressing 3   Bathing 2   Toileting 3   Upper Body Dressing 3   Grooming 3   Eating 3   Daily Activity Raw Score 17   Daily Activity Standardized Score (Calc for Raw Score >=11) 37.26   AM-PAC Applied Cognition Inpatient   Following a Speech/Presentation 3   Understanding Ordinary Conversation 3   Taking Medications 2   Remembering Where Things Are Placed or Put Away 1   Remembering List of 4-5 Errands 1   Taking Care of Complicated Tasks 1   Applied Cognition Raw Score 11   Applied Cognition Standardized Score 27.03 " "  [place] Cognitive Level   [place] Cognitive Level Score (S)  3.4   [place] Cognitive Score Recommendation (S)  24 hour assistance   [place] Cognitive Level Comments (S)  Pt seen for participation in ACLS. Use of LACLS at this time. Pt able to complete running stitch with cuing for continuation of task.  Pt able to identify light/dark sides of leather. Pt unable to complete any whip stitches despite cuing -completing x3 running stitches, and stating \"oh no I messed up\" Pt able to identify errors but unable to problem solving how to remove. Pt declining 2nd demonstration/trial     3.4    Administered [place] Cognitive Level Screen (ACLS).  Pt scored 3.4/6.0 indicating 24 hour care is recommended to sequence through routine steps of toileting, bathing, grooming and dressing.        Behavior:  Speaks without considering comprehension of listener.  May be distractible.  Grooming:  Spontaneously sustains actions of combing, brushing, shaving with electric razor or applying makeup.  Uses too much or too little toothpaste/makeup.  Looks at objects but fails to note effects.  Restrict access to harmful objects.  Dressing:  Selects items laid out and begins to don garments.  May pick several items and be unable to decide.  May quit before finished or don several layers.  May not pay attention to condition of garments (cleanliness or need for repair).  Bathing:  Picks up washcloth, soap, towel and wipes easy to reach body parts.  May wash in one spot, forget to use soap, may not remove all dirt or quit before task is complete.  Patient should not be left alone during bathing tasks.  Walking and exercising:  Ambulates within 2 or 3 familiar rooms to access desirable activities.  Can alter ambulation pace but is easily distracted.  May be impulsive when changing positions.  Has difficulty walking and talking at the same time.  Is at risk for falls.  Eating:  May anticipate meal times based on familiar signs (activity in kitchen).  Uses " all utensils except knife.  Rate may be rushed and may eat strongly preferred items only.  Failure to observe manners may alienate others.  Check food and beverage temperature.  Cannot follow dietary restrictions.  Toileting:  Recognizes need to void and goes to familiar bathrooms.  May not close door while in bathroom.  Dons and doffs clothing slowly, may need help with unusual fasteners.  Wipes but does not check results or wipes repeatedly using excess toilet paper.  May forget to wash hands.  May leave zipper open.  May need reminders to toilet in order to avoid accidents.  Medications:  May recognize medications by color or shape when it is given daily but does not note amounts or time of day.  Does not understand purpose of medications or side effects, may mistake for candy.  Prescription bottles need to be child proof.  Store medications in secure location away from patient. Pre-measured medications should be handed to the patient.   Use of adaptive devices: May be able to propel a wheelchair but cannot get around furniture and may get lost if allowed outside.  May not be able to utilize adaptive devices in safe manner.  May not be able to learn use of new adaptive device.  Housekeeping:  No awareness of need for housekeeping.  May  cloth and begin action of cleaning.  Does not note results and may quit when distracted.  Meal Preparation:  Does not plan for food.  May eat from refrigerator.  May be able to replicate repetitive actions for simple meal prep with supervision.  May be impulsive and require frequent redirection.  Restrict access to sharp tools and hot objects/surfaces.  Spending money:  May recognize local currency.  May hand money to another person in a familiar exchange situation; however, may not attend to amount given/received.  May not understand money is owed for services.  May loose money.  Should have assistance for all finances.  Shopping:  Follows a guide to shopping areas.  Looks  in windows or at displays with no intent to purchase.  May take items without paying.  May fail to notice price or if they have enough money to pay.  Do not leave alone in shopping areas.  Laundry:  May not recognize clothing is dirty.  Has no awareness of methods of doing laundry.  May do repetitive actions but may not be able to sequence through steps of task.  Traveling:  May be able to sit for 30 minutes in a car.  May not be aware of destination.  May recognize features on a familiar route.  May attempt to enter or leave car before it is fully stopped.  Use child safety locks.  Telephone:  Able to  phone when it rings and say “hello”.  May be able to call for another person or hang up when phone is not for them.  Not able to take messages.  May dial 1 or 2 known numbers, but may call for no reason.  May forget to hang up .  Driving:  Should not operate a motor vehicle      End of Consult   Patient Position at End of Consult Bedside chair;Bed/Chair alarm activated;All needs within reach         GOALS:      -Patient will perform grooming tasks standing at sink with overall Mod I in order to increase overall independence    -Patient will be Mod I with UB dressing using AE and AD as needed in order to increase (I) with ADLs    -Patient will be Mod I with UB bathing using AE and AD as needed in order to increase (I) with ADLs    -Patient will be Mod I with LB dressing with use of AE and AD as needed in order to increase (I) with ADLs    -Patient will be Mod I with LB bathing with use of AE and AD as needed in order to increase (I) with ADLs    -Patient will complete toileting w/ Mod I w/ G hygiene/thoroughness in order to reduce caregiver burden    -Patient will demonstrate Mod I with bed mobility for ability to manage own comfort and initiate OOB tasks.     -Patient will perform functional transfers with Supervision to/from all surfaces using DME as needed in order to increase (I) with functional  tasks    -Patient will be Mod I with functional mobility to/from bathroom for increased independence with toileting tasks    -Patient will tolerate therapeutic activities for greater than 30 min, in order to increase tolerance for functional activities.     -Patient will engage in ongoing cognitive assessment in order to assist with safe discharge planning/recommendations.      The patient's raw score on the -PAC Daily Activity Inpatient Short Form is 17. A raw score of less than 19 suggests the patient may benefit from discharge to post-acute rehabilitation services. Please refer to the recommendation of the Occupational Therapist for safe discharge planning.  Ruth Bob MS, OTR/L

## 2024-07-11 ENCOUNTER — NURSING HOME VISIT (OUTPATIENT)
Dept: GERIATRICS | Facility: OTHER | Age: 84
End: 2024-07-11
Payer: MEDICARE

## 2024-07-11 DIAGNOSIS — I48.11 LONGSTANDING PERSISTENT ATRIAL FIBRILLATION (HCC): Primary | ICD-10-CM

## 2024-07-11 DIAGNOSIS — I50.41 ACUTE COMBINED SYSTOLIC AND DIASTOLIC CONGESTIVE HEART FAILURE (HCC): ICD-10-CM

## 2024-07-11 DIAGNOSIS — E11.69 TYPE 2 DIABETES MELLITUS WITH OTHER SPECIFIED COMPLICATION, WITHOUT LONG-TERM CURRENT USE OF INSULIN (HCC): ICD-10-CM

## 2024-07-11 DIAGNOSIS — I10 PRIMARY HYPERTENSION: ICD-10-CM

## 2024-07-11 DIAGNOSIS — F32.A DEPRESSION, UNSPECIFIED DEPRESSION TYPE: ICD-10-CM
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
## 2024-07-11 DIAGNOSIS — E78.5 HYPERLIPIDEMIA, UNSPECIFIED HYPERLIPIDEMIA TYPE: ICD-10-CM

## 2024-07-11 DIAGNOSIS — A46 ERYSIPELAS OF BOTH LOWER EXTREMITIES: ICD-10-CM

## 2024-07-11 PROCEDURE — 99306 1ST NF CARE HIGH MDM 50: CPT | Performed by: INTERNAL MEDICINE

## 2024-07-12 NOTE — PROGRESS NOTES
Heart Failure Outpatient Progress Note - Nola Welch 84 y.o. female MRN: [id number]    @ Encounter: [id number]      Assessment/Plan:    Patient Active Problem List    Diagnosis Date Noted    Cognitive impairment 07/08/2024    Hypokalemia 07/08/2024    Hypomagnesemia 07/08/2024    Subtherapeutic INR 07/08/2024    Type 2 diabetes mellitus (HCC) 07/08/2024    Erysipelas of both lower extremities 07/04/2024    Acute combined systolic and diastolic congestive heart failure (HCC) 07/03/2024    Fall 04/02/2023    Scalp hematoma, initial encounter 04/02/2023    Atrial fibrillation (HCC) 05/22/2016    Acute respiratory failure with hypoxia (HCC) 05/22/2016    Depression 05/22/2016    Hypertension 05/22/2016    Hypothyroidism 05/22/2016    HLD (hyperlipidemia) 05/22/2016       Chronic HFmrEF  -Etiology-possibly tachycardia mediated due to A-fib RVR, versus acute viral illness, hypertensive urgency.  Has never had ischemic workup  -Examines mildly hypervolemic with weight gain  -Increase Lasix to 60 mg daily and increase Kdur to 20 meq daily  -Check BMP in 5-7 days  -Please continue daily standing weights  -Please continue 2 g Na diet with 2 L fluid restriction  -Will check a nuclear stress test to screen for CAD    Weights 167 lbs, today, 171 lbs    Neurohormonal Blockade:  --Beta-Blocker: carvedilol 12.5 mg BID  --ACEi, ARB or ARNi:   Lisinopril 30 mg daiy    (or SVR reduction)  --Aldosterone Is heReceptor Blocker:    --SGLT2 inhibitor:   --Diuretic: Lasix 40 mg daily, increase to 60 mg daily with 20 meq of Kdur     Sudden Cardiac Death Risk Reduction:  --ICD: EF 40%     Electrical Resynchronization:  --QRSd     CCM:    Inotropic support :     Advanced Therapies (If appropriate):  --We will continue to monitor      TTE 7/8/24:EF 40%. Mild global hypokinesis. LA and RA are moderately dilated. Aortic valve sclerosis. Moderate to severe MR and TR. Estimated RVSP is severely elevated at 77.00 mmHg. Trivial pericardial  effusion     Echo (6/13/23) - EF 60-65%. Indeterminate diastolic function due to Afib. LA moderately dilated. Estimated PASP 42.0 mmHg     PAF with RVR  Rate Carvedilol 12.5 mg BID  OAC Warfarin    HTN  HLD  Remote TIA  Fall  Cognitive impairment.   Hypothyroid  Depression    HPI:   presented to the hospital on 7/3/2024 due to generalized weakness, confusion, and fall at home.  CTH did not show any acute intracranial abnormality. also found to be in acute CHF exacerbation secondary to diuretic noncompliance, upper respiratory infection, hypertensive urgency, and atrial for with RVR.  She had fluid overload on examination, , and chest x-ray with mild pulmonary venous congestion.  She was diuresed with IV Lasix 40 mg twice daily with good urine output.  She was transitioned to Lasix p.o. 40 mg on the day of discharge.  Her home Lopressor 50 mg twice daily was also switched to Coreg 12.5 mg twice daily.  She had an echocardiogram on 7/8, which showed reduced ejection fraction of 40%, mild global hypokinesis, estimated right ventricular systolic pressure severely elevated at 77 mmHg.  Given persistently elevated blood pressures during hospital stay, lisinopril 20 mg dose was increased to lisinopril 30 mg daily. She is feeling ok today just fatigued. Says she is eating a lot at the rehab facility. Possibly some foods that are high in sodium. Things the top of her legs may be a little swollen. Tolerating PT sessions without difficulty.     Past Medical History:   Diagnosis Date    Anxiety     Asthma     Atrial fibrillation (HCC)     Cardiac disease     CHF (congestive heart failure) (HCC)     Depression 5/22/2016    HLD (hyperlipidemia) 5/22/2016    HTN (hypertension), benign 5/22/2016    Hyperlipidemia     Hypertension     Hypothyroidism 5/22/2016    Stroke (HCC)     Type 2 diabetes mellitus (HCC) 7/8/2024       12 point ROS negative other than that stated in HPI    Allergies   Allergen Reactions    Penicillins  Dermatitis     .    Current Outpatient Medications:     atorvastatin (LIPITOR) 40 mg tablet, Take 40 mg by mouth daily, Disp: , Rfl:     carvedilol (COREG) 12.5 mg tablet, Take 1 tablet (12.5 mg total) by mouth 2 (two) times a day with meals, Disp: 60 tablet, Rfl: 0    furosemide (LASIX) 40 mg tablet, Take 1 tablet (40 mg total) by mouth daily, Disp: 30 tablet, Rfl: 0    levothyroxine 50 mcg tablet, Take 50 mcg by mouth daily., Disp: , Rfl:     lisinopril (ZESTRIL) 30 mg tablet, Take 1 tablet (30 mg total) by mouth daily for 30 doses, Disp: 30 tablet, Rfl: 0    perphenazine-amitriptyline 2-10 MG TABS, Take 2 tablets by mouth daily., Disp: , Rfl:     potassium chloride (K-DUR) 10 mEq tablet, Take 10 mEq by mouth daily., Disp: , Rfl:     warfarin (COUMADIN) 4 mg tablet, Take 4 mg by mouth daily. Monday-friday, Disp: , Rfl:     Social History     Socioeconomic History    Marital status:      Spouse name: Not on file    Number of children: Not on file    Years of education: Not on file    Highest education level: Not on file   Occupational History    Not on file   Tobacco Use    Smoking status: Former     Current packs/day: 0.00     Types: Cigarettes     Quit date:      Years since quittin.5    Smokeless tobacco: Never   Substance and Sexual Activity    Alcohol use: No    Drug use: No    Sexual activity: Not on file   Other Topics Concern    Not on file   Social History Narrative    Not on file     Social Determinants of Health     Financial Resource Strain: Not on file   Food Insecurity: No Food Insecurity (2024)    Hunger Vital Sign     Worried About Running Out of Food in the Last Year: Never true     Ran Out of Food in the Last Year: Never true   Transportation Needs: No Transportation Needs (2024)    PRAPARE - Transportation     Lack of Transportation (Medical): No     Lack of Transportation (Non-Medical): No   Physical Activity: Not on file   Stress: Not on file   Social Connections: Not on  file   Intimate Partner Violence: Not on file   Housing Stability: Low Risk  (7/5/2024)    Housing Stability Vital Sign     Unable to Pay for Housing in the Last Year: No     Number of Times Moved in the Last Year: 0     Homeless in the Last Year: No       Family History   Problem Relation Age of Onset    No Known Problems Mother     No Known Problems Father     No Known Problems Sister     Stomach cancer Maternal Grandmother 69    No Known Problems Maternal Grandfather     No Known Problems Paternal Grandmother     No Known Problems Paternal Grandfather        Physical Exam:    Vitals: /70 (BP Location: Left arm, Patient Position: Sitting, Cuff Size: Standard)   Pulse 60   Wt 77.8 kg (171 lb 9.6 oz) Comment: pt stated  SpO2 94%   BMI 29.00 kg/m²     Wt Readings from Last 3 Encounters:   07/09/24 76 kg (167 lb 8.8 oz)   04/02/23 83.2 kg (183 lb 6.8 oz)   08/27/21 85.3 kg (188 lb)       GEN: Nola Welch appears well, alert and oriented x 3, pleasant and cooperative   HEENT: pupils equal, round, and reactive to light; extraocular muscles intact  NECK: supple, no carotid bruits   HEART: irregular rhythm, normal S1 and S2, no murmurs, clicks, gallops or rubs, JVP is  up  LUNGS: clear to auscultation bilaterally; no wheezes, rales, or rhonchi   ABDOMEN: normal bowel sounds, soft, no tenderness, no distention  EXTREMITIES: peripheral pulses normal; no clubbing, cyanosis, +1 BLLE edema  NEURO: no focal findings   SKIN: normal without suspicious lesions on exposed skin    Labs & Results:  Lab Results   Component Value Date    SODIUM 137 07/09/2024    K 4.5 07/09/2024     07/09/2024    CO2 29 07/09/2024    ANIONGAP 21 (H) 05/19/2015    AGAP 6 07/09/2024    BUN 14 07/09/2024    CREATININE 0.72 07/09/2024    GLUC 122 07/09/2024    CALCIUM 10.0 07/09/2024    AST 16 07/05/2024    ALT 9 07/05/2024    ALKPHOS 65 07/05/2024    TP 6.0 (L) 07/05/2024    TBILI 0.82 07/05/2024    EGFR 78 07/09/2024     Lab Results  "  Component Value Date    WBC 8.22 07/09/2024    HGB 13.6 07/09/2024    HCT 42.8 07/09/2024    MCV 92 07/09/2024     07/09/2024     Lab Results   Component Value Date     (H) 07/03/2024      No results found for: \"LDLCALC\"  Lab Results   Component Value Date    YLD2GNEMXDZA 1.478 07/04/2024    TSH 2.11 05/11/2023     Lab Results   Component Value Date    HGBA1C 6.4 (H) 12/07/2023         EKG personally reviewed by CIERA Greenberg.   No results found for this visit on 07/15/24.     Counseling / Coordination of Care  Total face to face time spent with patient 20 minutes.  An additional 10 minutes was spent for chart/data review and visit preparation.       Thank you for the opportunity to participate in the care of this patient.    CIERA Greenberg   "

## 2024-07-13 ENCOUNTER — TELEPHONE (OUTPATIENT)
Dept: OTHER | Facility: OTHER | Age: 84
End: 2024-07-13

## 2024-07-13 NOTE — ASSESSMENT & PLAN NOTE
Blood pressure stable on lisinopril 30 mg 1.5 mg twice daily and Lasix 40 mg daily.  Lisinopril dose was recently increased to 30 mg daily for elevated blood pressure readings hospital

## 2024-07-13 NOTE — PROGRESS NOTES
[facility]  [address]  [place] [address]  [id number]    Nursing Home Admission    NAME: Nola Welch  AGE: 84 y.o. SEX: female [id number]      Patient Location     Currently [facility]      Patient’s vitals, labs and updated medications [facility]. Past Medical, surgical, social, medication and allergy history and patient’s previous records reviewed.       Assessment/Plan:    Atrial fibrillation (HCC)  Heart rate stable on carvedilol.  Continue warfarin.  Follow-up INR and adjust Coumadin dose accordingly    Hypertension  Blood pressure stable on lisinopril 30 mg 1.5 mg twice daily and Lasix 40 mg daily.  Lisinopril dose was recently increased to 30 mg daily for elevated blood pressure readings hospital    HLD (hyperlipidemia)  Continue atorvastatin    Depression  Continue home Perphenazine-amitriptyline 2-10 mg 2 tabs daily    Acute combined systolic and diastolic congestive heart failure (HCC)  Wt Readings from Last 3 Encounters:   07/09/24 76 kg (167 lb 8.8 oz)   04/02/23 83.2 kg (183 lb 6.8 oz)   08/27/21 85.3 kg (188 lb)   Patient was diagnosed with acute on chronic CHF hospital noncompliance.  Status changed to p.o. Lasix 40 mg daily.    Echocardiogram revealed an EF of 40%, mild global hypokinesis and severely elevated right ventricular systolic pressure  Clinically improved  except continues with mild edema of lower extremities.  Monitor weights.  Continue maintenance dose of Lasix            Erysipelas of both lower extremities  To face and bilateral lower extremities  Previously seen at [facility] ED on 7/2 for similar complaint. Discharged home on Keflex, however, she was not compliant  Completed 5 day course of IV Ancef on 7/8.   Overall significantly improved    Type 2 diabetes mellitus (HCC)  Lab Results   Component Value Date    HGBA1C 6.4 (H) 12/07/2023    HGBA1C 6.7 (H) 05/11/2023    HGBA1C 7.0 (H)  08/23/2022     Not on any antihyperglycemic agents at home    Fall  History of recent fall.  CT head was unremarkable.  Continue PT OT          Chief Complaint     Recent hospitalization for CHF, erysipelas, recent fall    HPI       Patient is a 84 y.o. female with past medical history significant for A-fib, cognitive impairment, hypertension, hyperlipidemia, hypothyroidism and depression.  Patient was hospitalized on 7/3/2024 with generalized weakness confusion and fall at home.  CT head did not show any acute intracranial abnormality.  INR was subtherapeutic upon arrival with reading of 1.6.  Patient received 2 days of extra doses of warfarin.  She was also found to be in acute CHF due to diuretic noncompliance.  BNP was 779.  Chest x-ray showed mild venous congestion.  She was diuresed with IV Lasix-transition to p.o.  Lopressor was changed to Coreg.  Echocardiogram revealed an EF of 40%, mild global hypokinesis and severely elevated right ventricular systolic pressure.  Patient was also noted to have elevated blood pressure readings for which lisinopril dose was increased to 30 mg daily.  Patient additionally completed 5-day course of IV Ancef for erysipelas of the face.  During the hospital stay patient was seen by geriatric service outpatient follow-up was recommended.  Patient was subsequently discharged to [facility] patient is being seen for posthospital.  At the time of my evaluation patient is doing okay.  Denies any dyspnea or chest pain       Past Medical History:   Diagnosis Date    Anxiety     Asthma     Atrial fibrillation (HCC)     Cardiac disease     CHF (congestive heart failure) (HCC)     Depression 5/22/2016    HLD (hyperlipidemia) 5/22/2016    HTN (hypertension), benign 5/22/2016    Hyperlipidemia     Hypertension     Hypothyroidism 5/22/2016    Stroke (HCC)     Type 2 diabetes mellitus (HCC) 7/8/2024       Past Surgical History:   Procedure Laterality Date    BREAST EXCISIONAL BIOPSY  Right 2001    benign    TONSILLECTOMY      TOTAL ABDOMINAL HYSTERECTOMY Bilateral     age 54    TOTAL ABDOMINAL HYSTERECTOMY W/ BILATERAL SALPINGOOPHORECTOMY Bilateral     age 54       Social History     Tobacco Use   Smoking Status Former    Current packs/day: 0.00    Types: Cigarettes    Quit date:     Years since quittin.5   Smokeless Tobacco Never          Family History   Problem Relation Age of Onset    No Known Problems Mother     No Known Problems Father     No Known Problems Sister     Stomach cancer Maternal Grandmother 69    No Known Problems Maternal Grandfather     No Known Problems Paternal Grandmother     No Known Problems Paternal Grandfather         Allergies   Allergen Reactions    Penicillins Dermatitis          Current Outpatient Medications:     atorvastatin (LIPITOR) 40 mg tablet, Take 40 mg by mouth daily, Disp: , Rfl:     carvedilol (COREG) 12.5 mg tablet, Take 1 tablet (12.5 mg total) by mouth 2 (two) times a day with meals, Disp: 60 tablet, Rfl: 0    furosemide (LASIX) 40 mg tablet, Take 1 tablet (40 mg total) by mouth daily, Disp: 30 tablet, Rfl: 0    levothyroxine 50 mcg tablet, Take 50 mcg by mouth daily., Disp: , Rfl:     lisinopril (ZESTRIL) 30 mg tablet, Take 1 tablet (30 mg total) by mouth daily for 30 doses, Disp: 30 tablet, Rfl: 0    perphenazine-amitriptyline 2-10 MG TABS, Take 2 tablets by mouth daily., Disp: , Rfl:     potassium chloride (K-DUR) 10 mEq tablet, Take 10 mEq by mouth daily., Disp: , Rfl:     warfarin (COUMADIN) 4 mg tablet, Take 4 mg by mouth daily. Monday-friday, Disp: , Rfl:     [facility]     Vitals:    24 2200   BP: 143/63   Pulse: 64   Resp: 18   Temp: 97.8 °F (36.6 °C)       Vital signs were reviewed in [facility]    Review of Systems    Physical Exam  Constitutional:       General: She is not in acute distress.  HENT:      Head: Normocephalic and atraumatic.      Nose: No  "rhinorrhea.   Eyes:      General: No scleral icterus.        Right eye: No discharge.         Left eye: No discharge.   Cardiovascular:      Rate and Rhythm: Normal rate and regular rhythm.   Pulmonary:      Breath sounds: No wheezing, rhonchi or rales.   Abdominal:      General: There is no distension.      Palpations: Abdomen is soft.      Tenderness: There is no abdominal tenderness. There is no guarding.   Musculoskeletal:      Cervical back: Neck supple.      Right lower leg: Edema present.      Left lower leg: Edema present.   Skin:     Coloration: Skin is not jaundiced.      Findings: Erythema (small erythematous patches noted over b/l LE) present.      Comments: Peeling of facial skin noted   Neurological:      General: No focal deficit present.      Mental Status: Mental status is at baseline.      Cranial Nerves: No cranial nerve deficit.   Psychiatric:         Mood and Affect: Mood normal.         Behavior: Behavior normal.           Diagnostic Data       Recent labs and imaging studies were reviewed.  Lab Results   Component Value Date    SODIUM 137 07/09/2024    K 4.5 07/09/2024     07/09/2024    CO2 29 07/09/2024    BUN 14 07/09/2024    CREATININE 0.72 07/09/2024    GLUC 122 07/09/2024    CALCIUM 10.0 07/09/2024      Lab Results   Component Value Date    WBC 8.22 07/09/2024    HGB 13.6 07/09/2024    HCT 42.8 07/09/2024    MCV 92 07/09/2024     07/09/2024         Code Status:      Full code            Portions of the record may have been created with voice recognition software.  Occasional wrong word or \"sound a like\" substitutions may have occurred due to the inherent limitations of voice recognition software.  Read the chart carefully and recognize, using context, where substitutions have occurred.    This note was electronically signed by Dr. Therese Morales   "

## 2024-07-13 NOTE — ASSESSMENT & PLAN NOTE
To face and bilateral lower extremities  Previously seen at [facility] ED on 7/2 for similar complaint. Discharged home on Keflex, however, she was not compliant  Completed 5 day course of IV Ancef on 7/8.   Overall significantly improved

## 2024-07-13 NOTE — TELEPHONE ENCOUNTER
" Roseann ROA from [facility] stated, \" I am calling to discuss INR results with the on call provider.\"        Paged on call VIA TT  "

## 2024-07-13 NOTE — ASSESSMENT & PLAN NOTE
Heart rate stable on carvedilol.  Continue warfarin.  Follow-up INR and adjust Coumadin dose accordingly

## 2024-07-13 NOTE — ASSESSMENT & PLAN NOTE
Wt Readings from Last 3 Encounters:   07/09/24 76 kg (167 lb 8.8 oz)   04/02/23 83.2 kg (183 lb 6.8 oz)   08/27/21 85.3 kg (188 lb)   Patient was diagnosed with acute on chronic CHF hospital noncompliance.  Status changed to p.o. Lasix 40 mg daily.    Echocardiogram revealed an EF of 40%, mild global hypokinesis and severely elevated right ventricular systolic pressure  Clinically improved  except continues with mild edema of lower extremities.  Monitor weights.  Continue maintenance dose of Lasix

## 2024-07-14 VITALS
TEMPERATURE: 97.8 F | RESPIRATION RATE: 18 BRPM | WEIGHT: 167.4 LBS | SYSTOLIC BLOOD PRESSURE: 143 MMHG | HEART RATE: 64 BPM | DIASTOLIC BLOOD PRESSURE: 63 MMHG | BODY MASS INDEX: 28.29 KG/M2

## 2024-07-15 ENCOUNTER — OFFICE VISIT (OUTPATIENT)
Dept: CARDIOLOGY CLINIC | Facility: CLINIC | Age: 84
End: 2024-07-15
Payer: MEDICARE

## 2024-07-15 VITALS
SYSTOLIC BLOOD PRESSURE: 116 MMHG | BODY MASS INDEX: 29 KG/M2 | WEIGHT: 171.6 LBS | HEART RATE: 60 BPM | DIASTOLIC BLOOD PRESSURE: 70 MMHG | OXYGEN SATURATION: 94 %

## 2024-07-15 DIAGNOSIS — I50.9 ACUTE EXACERBATION OF CHF (CONGESTIVE HEART FAILURE) (HCC): ICD-10-CM

## 2024-07-15 DIAGNOSIS — I25.5 CARDIOMYOPATHY, ISCHEMIC: ICD-10-CM

## 2024-07-15 PROCEDURE — 99214 OFFICE O/P EST MOD 30 MIN: CPT | Performed by: NURSE PRACTITIONER

## 2024-07-15 RX ORDER — FUROSEMIDE 40 MG/1
60 TABLET ORAL DAILY
Qty: 45 TABLET | Refills: 3 | Status: SHIPPED | OUTPATIENT
Start: 2024-07-15

## 2024-07-15 NOTE — PATIENT INSTRUCTIONS
Weigh yourself daily  If you gain 3 lbs in one day or 5 lbs in one week, please call the office at [phone] and ask for a nurse or the heart failure nurse  Keep your sodium intake to <2 grams, (2000 mg) per day, and fluids <2 Liters (2000 ml) per day. This is around 6-7, 8 oz glasses of fluid per day    Increase lasix to 60 mg daily  Increase Kdur to 20 meq daily  Lab work in one week  Schedule stress test.

## 2024-07-19 ENCOUNTER — NURSING HOME VISIT (OUTPATIENT)
Dept: GERIATRICS | Facility: OTHER | Age: 84
End: 2024-07-19
Payer: MEDICARE

## 2024-07-19 VITALS
TEMPERATURE: 97.6 F | SYSTOLIC BLOOD PRESSURE: 153 MMHG | WEIGHT: 173.2 LBS | OXYGEN SATURATION: 92 % | DIASTOLIC BLOOD PRESSURE: 67 MMHG | RESPIRATION RATE: 18 BRPM | HEART RATE: 50 BPM | BODY MASS INDEX: 29.27 KG/M2

## 2024-07-19 DIAGNOSIS — E78.5 HYPERLIPIDEMIA, UNSPECIFIED HYPERLIPIDEMIA TYPE: ICD-10-CM

## 2024-07-19 DIAGNOSIS — A46 ERYSIPELAS OF BOTH LOWER EXTREMITIES: ICD-10-CM

## 2024-07-19 DIAGNOSIS — I50.41 ACUTE COMBINED SYSTOLIC AND DIASTOLIC CONGESTIVE HEART FAILURE (HCC): ICD-10-CM

## 2024-07-19 DIAGNOSIS — I10 PRIMARY HYPERTENSION: ICD-10-CM

## 2024-07-19 DIAGNOSIS — E06.3 HYPOTHYROIDISM DUE TO HASHIMOTO'S THYROIDITIS: ICD-10-CM

## 2024-07-19 DIAGNOSIS — F32.A DEPRESSION, UNSPECIFIED DEPRESSION TYPE: ICD-10-CM

## 2024-07-19 DIAGNOSIS — I48.11 LONGSTANDING PERSISTENT ATRIAL FIBRILLATION (HCC): Primary | ICD-10-CM

## 2024-07-19 DIAGNOSIS — E11.69 TYPE 2 DIABETES MELLITUS WITH OTHER SPECIFIED COMPLICATION, WITHOUT LONG-TERM CURRENT USE OF INSULIN (HCC): ICD-10-CM

## 2024-07-19 DIAGNOSIS — E03.8 HYPOTHYROIDISM DUE TO HASHIMOTO'S THYROIDITIS: ICD-10-CM

## 2024-07-19 PROCEDURE — 99309 SBSQ NF CARE MODERATE MDM 30: CPT | Performed by: INTERNAL MEDICINE

## 2024-07-20 NOTE — ASSESSMENT & PLAN NOTE
Heart rate stable on carvedilol.   INR was 1.9 on 7/17/2024.  Coumadin dose was adjusted.  Follow-up repeat INR

## 2024-07-20 NOTE — ASSESSMENT & PLAN NOTE
Wt Readings from Last 3 Encounters:   07/17/24 78.6 kg (173 lb 3.2 oz)   07/15/24 77.8 kg (171 lb 9.6 oz)   07/15/24 77.8 kg (171 lb 9.6 oz)     Patient was recently noted to have weight gain of around 4 pounds during the last week.  She was evaluated by cardiology service.  Lasix dose was increased to 60 mg daily.  Monitor response on above.  Patient denies any dyspnea, continues with edema involving lower extremities

## 2024-07-20 NOTE — PROGRESS NOTES
[facility]  [address]   [address]  [place], [address] [phone]    Progress Note  Code SNF 31    Patient Location     [facility]    Reason for visit     F.U HTN, hypothyroidism, AF, Depression, hyperlipidemia, erysipelas of both extremities    Patient’s care was coordinated with nursing facility staff. Recent vitals, labs and updated medications were reviewed on [facility] system of facility.     Problem List Items Addressed This Visit          Cardiovascular and Mediastinum    Atrial fibrillation (HCC) - Primary     Heart rate stable on carvedilol.   INR was 1.9 on 7/17/2024.  Coumadin dose was adjusted.  Follow-up repeat INR         Hypertension     Blood pressure stable on lisinopril 30 mg 1.5 mg twice daily and Lasix 40 mg daily.          Acute combined systolic and diastolic congestive heart failure (HCC)     Wt Readings from Last 3 Encounters:   07/17/24 78.6 kg (173 lb 3.2 oz)   07/15/24 77.8 kg (171 lb 9.6 oz)   07/15/24 77.8 kg (171 lb 9.6 oz)     Patient was recently noted to have weight gain of around 4 pounds during the last week.  She was evaluated by cardiology service.  Lasix dose was increased to 60 mg daily.  Monitor response on above.  Patient denies any dyspnea, continues with edema involving lower extremities              Endocrine    Hypothyroidism     TSH 1.478  Continue home levothyroxine 50 mcg         Type 2 diabetes mellitus (HCC)     Lab Results   Component Value Date    HGBA1C 6.4 (H) 12/07/2023    HGBA1C 6.7 (H) 05/11/2023    HGBA1C 7.0 (H) 08/23/2022     Patient is currently not on any insulin or oral hypoglycemic agents.  Check hemoglobin A1c with next blood draw            Musculoskeletal and Integument    Erysipelas of both lower extremities     Resolved.  Patient recently completed antibiotic treatment            Behavioral Health    Depression     Continue home Perphenazine-amitriptyline 2-10 mg 2 tabs daily             Other    HLD (hyperlipidemia)     Continue atorvastatin                HPI       Patient is being seen for a follow-up visit today.  She is doing okay at present.  Much more awake and alert today.  Denies any dyspnea or chest pain.  Facial and lower extremity erythema has resolved.  Patient continues with edema involving bilateral lower extremities.  She was noted to have weight gain of around 4 pounds during the last week.  She was evaluated by cardiology service.  Lasix dose was increased to 60 mg daily.    Review of Systems   Constitutional:  Negative for chills and fever.   Respiratory:  Negative for shortness of breath, wheezing and stridor.    Cardiovascular:  Negative for chest pain.   Gastrointestinal:  Negative for abdominal distention, abdominal pain and vomiting.   Genitourinary:  Negative for flank pain and hematuria.   Musculoskeletal:  Positive for gait problem.   Neurological:  Positive for weakness. Negative for seizures and syncope.   Psychiatric/Behavioral:  Negative for agitation and behavioral problems.        Past Medical History:   Diagnosis Date    Anxiety     Asthma     Atrial fibrillation (HCC)     Cardiac disease     CHF (congestive heart failure) (HCC)     Depression 2016    HLD (hyperlipidemia) 2016    HTN (hypertension), benign 2016    Hyperlipidemia     Hypertension     Hypothyroidism 2016    Stroke (HCC)     Type 2 diabetes mellitus (HCC) 2024       Past Surgical History:   Procedure Laterality Date    BREAST EXCISIONAL BIOPSY Right     benign    TONSILLECTOMY      TOTAL ABDOMINAL HYSTERECTOMY Bilateral     age 54    TOTAL ABDOMINAL HYSTERECTOMY W/ BILATERAL SALPINGOOPHORECTOMY Bilateral     age 54       Social History     Tobacco Use   Smoking Status Former    Current packs/day: 0.00    Types: Cigarettes    Quit date:     Years since quittin.5   Smokeless Tobacco Never       Family History   Problem Relation Age of Onset    No Known  Problems Mother     No Known Problems Father     No Known Problems Sister     Stomach cancer Maternal Grandmother 69    No Known Problems Maternal Grandfather     No Known Problems Paternal Grandmother     No Known Problems Paternal Grandfather         Allergies   Allergen Reactions    Furosemide Diarrhea     DIARRHEA    Penicillins Dermatitis         Current Outpatient Medications:     atorvastatin (LIPITOR) 40 mg tablet, Take 40 mg by mouth daily, Disp: , Rfl:     carvedilol (COREG) 12.5 mg tablet, Take 1 tablet (12.5 mg total) by mouth 2 (two) times a day with meals, Disp: 60 tablet, Rfl: 0    furosemide (LASIX) 40 mg tablet, Take 1.5 tablets (60 mg total) by mouth daily, Disp: 45 tablet, Rfl: 3    levothyroxine 50 mcg tablet, Take 50 mcg by mouth daily., Disp: , Rfl:     lisinopril (ZESTRIL) 30 mg tablet, Take 1 tablet (30 mg total) by mouth daily for 30 doses, Disp: 30 tablet, Rfl: 0    perphenazine-amitriptyline 2-10 MG TABS, Take 2 tablets by mouth daily., Disp: , Rfl:     potassium chloride (K-DUR) 10 mEq tablet, Take 20 mEq by mouth daily, Disp: , Rfl:     warfarin (COUMADIN) 4 mg tablet, Take 5 mg by mouth daily Monday-friday, Disp: , Rfl:     Updated list was reviewed in [facility] system of facility.     Vitals:    07/17/24 1847   BP: 153/67   Pulse: (!) 50   Resp: 18   Temp: 97.6 °F (36.4 °C)   SpO2: 92%       Physical Exam  Constitutional:       General: She is not in acute distress.  HENT:      Head: Normocephalic and atraumatic.   Eyes:      General: No scleral icterus.  Cardiovascular:      Rate and Rhythm: Normal rate and regular rhythm.   Pulmonary:      Breath sounds: No wheezing, rhonchi or rales.   Abdominal:      General: There is no distension.      Palpations: Abdomen is soft.      Tenderness: There is no abdominal tenderness. There is no guarding.   Musculoskeletal:      Cervical back: Neck supple.      Right lower leg: Edema present.      Left lower leg: Edema present.   Skin:      "Coloration: Skin is not jaundiced.   Neurological:      General: No focal deficit present.      Cranial Nerves: No cranial nerve deficit.      Motor: No weakness.   Psychiatric:         Mood and Affect: Mood normal.         Behavior: Behavior normal.         Diagnostic Data:    BMP from 7/16/2024 revealed BUN of 17, creatinine 0.85, sodium 142, potassium 3.9.  PT/INR was 1.9 on 7/17/2024      Portions of the record may have been created with voice recognition software.  Occasional wrong word or \"sound a like\" substitutions may have occurred due to the inherent limitations of voice recognition software.  Read the chart carefully and recognize, using context, where substitutions have occurred.    This note was electronically signed by Dr. Therese Morales   "

## 2024-07-20 NOTE — ASSESSMENT & PLAN NOTE
Lab Results   Component Value Date    HGBA1C 6.4 (H) 12/07/2023    HGBA1C 6.7 (H) 05/11/2023    HGBA1C 7.0 (H) 08/23/2022     Patient is currently not on any insulin or oral hypoglycemic agents.  Check hemoglobin A1c with next blood draw

## 2024-07-23 ENCOUNTER — NURSING HOME VISIT (OUTPATIENT)
Dept: GERIATRICS | Facility: OTHER | Age: 84
End: 2024-07-23
Payer: MEDICARE

## 2024-07-23 VITALS
TEMPERATURE: 98.3 F | BODY MASS INDEX: 29.14 KG/M2 | DIASTOLIC BLOOD PRESSURE: 72 MMHG | WEIGHT: 172.4 LBS | HEART RATE: 73 BPM | SYSTOLIC BLOOD PRESSURE: 176 MMHG | OXYGEN SATURATION: 93 %

## 2024-07-23 DIAGNOSIS — I10 PRIMARY HYPERTENSION: ICD-10-CM

## 2024-07-23 DIAGNOSIS — I50.41 ACUTE COMBINED SYSTOLIC AND DIASTOLIC CONGESTIVE HEART FAILURE (HCC): Primary | ICD-10-CM

## 2024-07-23 DIAGNOSIS — I48.11 LONGSTANDING PERSISTENT ATRIAL FIBRILLATION (HCC): ICD-10-CM

## 2024-07-23 DIAGNOSIS — R41.89 COGNITIVE IMPAIRMENT: ICD-10-CM

## 2024-07-23 PROCEDURE — 99309 SBSQ NF CARE MODERATE MDM 30: CPT

## 2024-07-23 NOTE — ASSESSMENT & PLAN NOTE
BP elevated   Currently on lasix 60 mg daily- see plan under CHF  Continue lisinopril 30 mg daily   Continue coreg   Monitor BP

## 2024-07-23 NOTE — ASSESSMENT & PLAN NOTE
Wt Readings from Last 3 Encounters:   07/17/24 78.6 kg (173 lb 3.2 oz)   07/15/24 77.8 kg (171 lb 9.6 oz)   07/15/24 77.8 kg (171 lb 9.6 oz)     Continues with weight gain, now hypertensive   Cardiology recently increase lasix to 60 mg daily   Will give additional 20 mg lasix in afternoon x 3 days for total 80 mg daily x 3 days   Continue carvedilol   Continue daily weights   TEDs ordered  Encourage leg elevation   Encourage cardiac diet   BMP ordered for Thursday

## 2024-07-23 NOTE — ASSESSMENT & PLAN NOTE
Continue carvedilol for rate control   Continue warfarin for AC  Goal INR 2-3  Repeat PT/INR on Thursday

## 2024-07-23 NOTE — ASSESSMENT & PLAN NOTE
Alert and oriented   Fall/safety precautions  Supportive care, assistance with ADLs   Avoid deliriogenic medications   Encourage adequate hydration and nutrition   Maintain sleep/wake cycle

## 2024-07-23 NOTE — PROGRESS NOTES
[facility]  [address], [place], [place]  [phone]  Facility: [facility]  POS 31      NAME: Nola Welch  AGE: 84 y.o. SEX: female CODE STATUS: CPR  : 1940     DATE: 2024     Assessment and Plan:     Problem List Items Addressed This Visit       Atrial fibrillation (HCC)     Continue carvedilol for rate control   Continue warfarin for AC  Goal INR 2-3  Repeat PT/INR on Thursday            Hypertension     BP elevated   Currently on lasix 60 mg daily- see plan under CHF  Continue lisinopril 30 mg daily   Continue coreg   Monitor BP         Acute combined systolic and diastolic congestive heart failure (HCC) - Primary     Wt Readings from Last 3 Encounters:   24 78.6 kg (173 lb 3.2 oz)   07/15/24 77.8 kg (171 lb 9.6 oz)   07/15/24 77.8 kg (171 lb 9.6 oz)     Continues with weight gain, now hypertensive   Cardiology recently increase lasix to 60 mg daily   Will give additional 20 mg lasix in afternoon x 3 days for total 80 mg daily x 3 days   Continue carvedilol   Continue daily weights   TEDs ordered  Encourage leg elevation   Encourage cardiac diet   BMP ordered for Thursday           Cognitive impairment     Alert and oriented   Fall/safety precautions  Supportive care, assistance with ADLs   Avoid deliriogenic medications   Encourage adequate hydration and nutrition   Maintain sleep/wake cycle                  Chief Complaint:     Follow up visit     History of Present Illness:     Patient is a 84 y.o. old female being seen at [facility] for follow up of acute and chronic medical conditions. Patient seen and examined sitting in chair without acute distress. Nursing reported continued increased weights as well as high blood pressure despite recent increase in lasix dosage by cardiology. Patient reports she does not like to wear stockings because her legs are too big and does not elevate legs at rehab as she almost feel out of recliner earlier during stay.  She denies CP/SOB. Reports headache. Patient also reported double vision but appears to be inconsistent with timeline as she reports she had this at home and was hallucinating seeing a dog and little girl in her room at home prior to hospitalization.         The following portions of the patient's history were reviewed and updated as appropriate: allergies, current medications, past family history, past medical history, past social history, past surgical history and problem list.     Review of Systems:     Review of Systems   Cardiovascular:  Positive for leg swelling.   Musculoskeletal:  Positive for gait problem.   Neurological:  Positive for weakness.   All other systems reviewed and are negative.       Problem List:     Patient Active Problem List   Diagnosis    Atrial fibrillation (HCC)    Acute respiratory failure with hypoxia (HCC)    Depression    Hypertension    Hypothyroidism    HLD (hyperlipidemia)    Fall    Scalp hematoma, initial encounter    Acute combined systolic and diastolic congestive heart failure (HCC)    Erysipelas of both lower extremities    Cognitive impairment    Hypokalemia    Hypomagnesemia    Subtherapeutic INR    Type 2 diabetes mellitus (HCC)        Objective:     BP (!) 176/72   Pulse 73   Temp 98.3 °F (36.8 °C)   Wt 78.2 kg (172 lb 6.4 oz)   SpO2 93%   BMI 29.14 kg/m²     Physical Exam  Vitals and nursing note reviewed.   Constitutional:       General: She is not in acute distress.     Appearance: She is well-developed.   HENT:      Head: Normocephalic and atraumatic.   Eyes:      Conjunctiva/sclera: Conjunctivae normal.   Cardiovascular:      Rate and Rhythm: Normal rate and regular rhythm.      Heart sounds: No murmur heard.  Pulmonary:      Effort: Pulmonary effort is normal. No respiratory distress.      Breath sounds: Normal breath sounds.   Abdominal:      Palpations: Abdomen is soft.      Tenderness: There is no abdominal tenderness.   Musculoskeletal:      Cervical  back: Neck supple.      Right lower leg: Edema present.      Left lower leg: Edema present.   Skin:     General: Skin is warm and dry.      Capillary Refill: Capillary refill takes less than 2 seconds.   Neurological:      Mental Status: She is alert. Mental status is at baseline.   Psychiatric:         Mood and Affect: Mood normal.         Speech: Speech normal.         Behavior: Behavior normal.         Cognition and Memory: Cognition is impaired.         Pertinent Laboratory/Diagnostic Studies:    Laboratory Results: I have personally reviewed the pertinent laboratory results/reports     Radiology/Other Diagnostic Testing Results: I have personally reviewed pertinent reports.      [facility]ER[facility] Huff  Geriatric Medicine

## 2024-07-25 ENCOUNTER — NURSING HOME VISIT (OUTPATIENT)
Dept: GERIATRICS | Facility: OTHER | Age: 84
End: 2024-07-25
Payer: MEDICARE
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
## 2024-07-25 DIAGNOSIS — I50.41 ACUTE COMBINED SYSTOLIC AND DIASTOLIC CONGESTIVE HEART FAILURE (HCC): ICD-10-CM

## 2024-07-25 DIAGNOSIS — I10 PRIMARY HYPERTENSION: ICD-10-CM

## 2024-07-25 DIAGNOSIS — I48.11 LONGSTANDING PERSISTENT ATRIAL FIBRILLATION (HCC): ICD-10-CM

## 2024-07-25 DIAGNOSIS — U07.1 COVID-19: Primary | ICD-10-CM

## 2024-07-25 PROCEDURE — 99309 SBSQ NF CARE MODERATE MDM 30: CPT

## 2024-07-25 NOTE — ASSESSMENT & PLAN NOTE
Continue carvedilol for rate control   Continue warfarin for AC  Goal INR 2-3  Repeat PT/INR pending

## 2024-07-25 NOTE — ASSESSMENT & PLAN NOTE
History of recent fall  Continue PT/OT  Maintain fall and safety precautions  Encourage appropriate DME use    following for discharge planning

## 2024-07-25 NOTE — ASSESSMENT & PLAN NOTE
Wt Readings from Last 3 Encounters:   07/23/24 78.2 kg (172 lb 6.4 oz)   07/17/24 78.6 kg (173 lb 3.2 oz)   07/15/24 77.8 kg (171 lb 9.6 oz)     Continue lasix 80 mg daily through tomorrow then resume 60 mg daily   Continue carvedilol   Continue daily weights   Encourage use of compression stockings and leg elevation   Continue cardiac diet   BMP pending

## 2024-07-25 NOTE — ASSESSMENT & PLAN NOTE
BP remains elevated but is improving   Continue higher dose lasix through tomorrow   Continue lisinopril   Continue coreg   Monitor BP

## 2024-07-25 NOTE — PROGRESS NOTES
[facility]  [address] [place] [address], [place], [place]  [phone]  Facility: [facility]  POS 31      NAME: Nola Welch  AGE: 84 y.o. SEX: female CODE STATUS: CPR  : 1940     DATE: 2024     Assessment and Plan:     Problem List Items Addressed This Visit       Atrial fibrillation (HCC)     Continue carvedilol for rate control   Continue warfarin for AC  Goal INR 2-3  Repeat PT/INR pending            Hypertension     BP remains elevated but is improving   Continue higher dose lasix through tomorrow   Continue lisinopril   Continue coreg   Monitor BP          Fall     History of recent fall  Continue PT/OT  Maintain fall and safety precautions  Encourage appropriate DME use    following for discharge planning           Acute combined systolic and diastolic congestive heart failure (HCC)     Wt Readings from Last 3 Encounters:   24 78.2 kg (172 lb 6.4 oz)   24 78.6 kg (173 lb 3.2 oz)   07/15/24 77.8 kg (171 lb 9.6 oz)     Continue lasix 80 mg daily through tomorrow then resume 60 mg daily   Continue carvedilol   Continue daily weights   Encourage use of compression stockings and leg elevation   Continue cardiac diet   BMP pending          COVID-19 - Primary     Tested positive for COVID-19 today   Reports symptoms of fatigue started a few days ago- out of the window for antivirals given symptom onset  Reports productive cough with yellow sputum   Remains afebrile   Supportive care  Requesting cough drops- ordered prn                 Chief Complaint:     Follow up visit     History of Present Illness:     Patient is a 84 y.o. old female being seen at [facility] for follow up of acute and chronic medical conditions. She was tested for COVID-19 this morning and was found to be positive. Patient reports her only symptoms are fatigue and cough, she does report having a sore throat the other day but has since resolved. She denies fever/chills/CP/SOB. She reports  occassional productive cough with yellow sputum. She would like cough drops.         The following portions of the patient's history were reviewed and updated as appropriate: allergies, current medications, past family history, past medical history, past social history, past surgical history and problem list.     Review of Systems:     Review of Systems   Constitutional:  Positive for fatigue.   Respiratory:  Positive for cough.    All other systems reviewed and are negative.       Problem List:     Patient Active Problem List   Diagnosis    Atrial fibrillation (HCC)    Acute respiratory failure with hypoxia (HCC)    Depression    Hypertension    Hypothyroidism    HLD (hyperlipidemia)    Fall    Scalp hematoma, initial encounter    Acute combined systolic and diastolic congestive heart failure (HCC)    Erysipelas of both lower extremities    Cognitive impairment    Hypokalemia    Hypomagnesemia    Subtherapeutic INR    Type 2 diabetes mellitus (HCC)    COVID-19        Objective:     VSS    Physical Exam  Vitals and nursing note reviewed.   Constitutional:       General: She is not in acute distress.     Appearance: She is well-developed.   HENT:      Head: Normocephalic and atraumatic.   Eyes:      Conjunctiva/sclera: Conjunctivae normal.   Cardiovascular:      Rate and Rhythm: Normal rate and regular rhythm.      Heart sounds: No murmur heard.  Pulmonary:      Effort: Pulmonary effort is normal. No respiratory distress.      Breath sounds: Normal breath sounds.   Abdominal:      Palpations: Abdomen is soft.      Tenderness: There is no abdominal tenderness.   Musculoskeletal:      Cervical back: Neck supple.      Right lower leg: Edema present.      Left lower leg: Edema present.   Skin:     General: Skin is warm and dry.      Capillary Refill: Capillary refill takes less than 2 seconds.   Neurological:      Mental Status: She is alert. Mental status is at baseline.   Psychiatric:         Mood and Affect: Mood  normal.         Speech: Speech normal.         Behavior: Behavior normal.         Cognition and Memory: Cognition is impaired.         Pertinent Laboratory/Diagnostic Studies:    Laboratory Results: I have personally reviewed the pertinent laboratory results/reports     Radiology/Other Diagnostic Testing Results: I have personally reviewed pertinent reports.      CIERA Huff  Geriatric Medicine

## 2024-07-25 NOTE — ASSESSMENT & PLAN NOTE
Tested positive for COVID-19 today   Reports symptoms of fatigue started a few days ago- out of the window for antivirals given symptom onset  Reports productive cough with yellow sputum   Remains afebrile   Supportive care  Requesting cough drops- ordered prn

## 2024-07-26 ENCOUNTER — TELEPHONE (OUTPATIENT)
Dept: OTHER | Facility: OTHER | Age: 84
End: 2024-07-26

## 2024-07-26 NOTE — TELEPHONE ENCOUNTER
Nursing home or Independent living name:  If its not nursing home or Independent living, do they see PCP in Network:    Who is calling: [facility] [place]   Callback number: [phone] extension [phone]  Symptoms: cough, sore throat  Did you page oncall or place in triage hub: paged on call provider

## 2024-07-30 ENCOUNTER — NURSING HOME VISIT (OUTPATIENT)
Dept: GERIATRICS | Facility: OTHER | Age: 84
End: 2024-07-30
Payer: MEDICARE

## 2024-07-30 VITALS
HEART RATE: 76 BPM | WEIGHT: 164.4 LBS | RESPIRATION RATE: 18 BRPM | SYSTOLIC BLOOD PRESSURE: 112 MMHG | OXYGEN SATURATION: 99 % | TEMPERATURE: 96.6 F | BODY MASS INDEX: 27.78 KG/M2 | DIASTOLIC BLOOD PRESSURE: 66 MMHG

## 2024-07-30 DIAGNOSIS — E06.3 HYPOTHYROIDISM DUE TO HASHIMOTO'S THYROIDITIS: ICD-10-CM

## 2024-07-30 DIAGNOSIS — E78.5 HYPERLIPIDEMIA, UNSPECIFIED HYPERLIPIDEMIA TYPE: ICD-10-CM

## 2024-07-30 DIAGNOSIS — I48.11 LONGSTANDING PERSISTENT ATRIAL FIBRILLATION (HCC): Primary | ICD-10-CM

## 2024-07-30 DIAGNOSIS — F32.A DEPRESSION, UNSPECIFIED DEPRESSION TYPE: ICD-10-CM

## 2024-07-30 DIAGNOSIS — E03.8 HYPOTHYROIDISM DUE TO HASHIMOTO'S THYROIDITIS: ICD-10-CM
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
## 2024-07-30 DIAGNOSIS — I50.41 ACUTE COMBINED SYSTOLIC AND DIASTOLIC CONGESTIVE HEART FAILURE (HCC): ICD-10-CM

## 2024-07-30 DIAGNOSIS — E11.69 TYPE 2 DIABETES MELLITUS WITH OTHER SPECIFIED COMPLICATION, WITHOUT LONG-TERM CURRENT USE OF INSULIN (HCC): ICD-10-CM

## 2024-07-30 DIAGNOSIS — A46 ERYSIPELAS OF BOTH LOWER EXTREMITIES: ICD-10-CM

## 2024-07-30 PROCEDURE — 99316 NF DSCHRG MGMT 30 MIN+: CPT | Performed by: INTERNAL MEDICINE

## 2024-07-30 NOTE — PROGRESS NOTES
[facility]  [address]   [address]  [place], [place], [address] [phone]                                              Discharge note  Code SNF 31    Patient Location     [facility]    Reason for visit     Nursing home discharge    Patient’s care was coordinated with nursing facility staff. Recent vitals, labs and updated medications were reviewed on adQ system of facility.     Problem List Items Addressed This Visit          Cardiovascular and Mediastinum    Atrial fibrillation (HCC) - Primary     Heart rate stable on carvedilol.   INR was 3.0 on 7/29/24.  Coumadin dose was adjusted.  Coumadin was held yesterday.  Dose was reduced to 4 mg daily.  Follow-up repeat INR           Acute combined systolic and diastolic congestive heart failure (HCC)     Wt Readings from Last 3 Encounters:   07/30/24 74.6 kg (164 lb 6.4 oz)   07/23/24 78.2 kg (172 lb 6.4 oz)   07/17/24 78.6 kg (173 lb 3.2 oz)       Clinically compensated.  Patient has mild edema of bilateral lower extremities.  Weight is down from 167 to 164 pounds.  Continue Lasix 60 mg daily.              Endocrine    Hypothyroidism     TSH 1.478  Continue home levothyroxine 50 mcg         Type 2 diabetes mellitus (HCC)     Lab Results   Component Value Date    HGBA1C 6.4 (H) 12/07/2023    HGBA1C 6.7 (H) 05/11/2023    HGBA1C 7.0 (H) 08/23/2022     Patient is currently not on any insulin or oral hypoglycemic agents.  Check hemoglobin A1c per PCP            Musculoskeletal and Integument    Erysipelas of both lower extremities     Resolved.  Patient recently completed antibiotic course            Behavioral Health    Depression     Continue home Perphenazine-amitriptyline 2-10 mg 2 tabs daily            Other    HLD (hyperlipidemia)     Continue atorvastatin         Fall     History of recent fall.  CT head was unremarkable.  Continue PT OT                HPI       Hospital course:    Patient is a 84 y.o.  female with past medical history significant for A-fib, cognitive impairment, hypertension, hyperlipidemia, hypothyroidism and depression.  Patient was hospitalized on 7/3/2024 with generalized weakness confusion and fall at home.  CT head did not show any acute intracranial abnormality.  INR was subtherapeutic upon arrival with reading of 1.6.  Patient received 2 days of extra doses of warfarin.  She was also found to be in acute CHF due to diuretic noncompliance.  BNP was 779.  Chest x-ray showed mild venous congestion.  She was diuresed with IV Lasix-transition to p.o.  Lopressor was changed to Coreg.  Echocardiogram revealed an EF of 40%, mild global hypokinesis and severely elevated right ventricular systolic pressure.  Patient was also noted to have elevated blood pressure readings for which lisinopril dose was increased to 30 mg daily.  Patient additionally completed 5-day course of IV Ancef for erysipelas of the face.  During the hospital stay patient was seen by geriatric service outpatient follow-up was recommended.  Patient was subsequently discharged to [facility]    Rehab course:  Patient progressed well with PT OT at rehab.  She was noted to have weight gain of around 5 pounds in the facility.  Patient was seen by cardiology service for a follow-up visit.  Maintenance dose of Lasix was 60 mg daily.  Weight came down from 169 to 164 pounds.  Patient had mild edema of bilateral lower extremities.  Patient completed antibiotic course for erysipelas in the facility.  Labs were stable with BUN of 14, creatinine 0.73, potassium 3.8.  PT/INR was 3 on 7/29/2024.  Coumadin dose was adjusted.  Patient is being discharged to [facility] facility .  Medically appears to be stable for discharge.      Review of Systems   Constitutional:  Negative for chills and fever.   Respiratory:  Negative for shortness of breath, wheezing and stridor.    Cardiovascular:  Negative for  chest pain.   Gastrointestinal:  Negative for abdominal distention, abdominal pain and vomiting.   Genitourinary:  Negative for flank pain and hematuria.   Musculoskeletal:  Negative for back pain.   Neurological:  Positive for weakness. Negative for seizures and syncope.   Psychiatric/Behavioral:  Negative for agitation and behavioral problems.        Past Medical History:   Diagnosis Date    Anxiety     Asthma     Atrial fibrillation (HCC)     Cardiac disease     CHF (congestive heart failure) (HCC)     Depression 2016    HLD (hyperlipidemia) 2016    HTN (hypertension), benign 2016    Hyperlipidemia     Hypertension     Hypothyroidism 2016    Stroke (HCC)     Type 2 diabetes mellitus (HCC) 2024       Past Surgical History:   Procedure Laterality Date    BREAST EXCISIONAL BIOPSY Right 2001    benign    TONSILLECTOMY      TOTAL ABDOMINAL HYSTERECTOMY Bilateral     age 54    TOTAL ABDOMINAL HYSTERECTOMY W/ BILATERAL SALPINGOOPHORECTOMY Bilateral     age 54       Social History     Tobacco Use   Smoking Status Former    Current packs/day: 0.00    Types: Cigarettes    Quit date:     Years since quittin.6   Smokeless Tobacco Never       Family History   Problem Relation Age of Onset    No Known Problems Mother     No Known Problems Father     No Known Problems Sister     Stomach cancer Maternal Grandmother 69    No Known Problems Maternal Grandfather     No Known Problems Paternal Grandmother     No Known Problems Paternal Grandfather         Allergies   Allergen Reactions    Furosemide Diarrhea     DIARRHEA    Penicillins Dermatitis         Current Outpatient Medications:     atorvastatin (LIPITOR) 40 mg tablet, Take 40 mg by mouth daily, Disp: , Rfl:     carvedilol (COREG) 12.5 mg tablet, Take 1 tablet (12.5 mg total) by mouth 2 (two) times a day with meals, Disp: 60 tablet, Rfl: 0    furosemide (LASIX) 40 mg tablet, Take 1.5 tablets (60 mg total) by mouth daily, Disp: 45  tablet, Rfl: 3    levothyroxine 50 mcg tablet, Take 50 mcg by mouth daily., Disp: , Rfl:     lisinopril (ZESTRIL) 30 mg tablet, Take 1 tablet (30 mg total) by mouth daily for 30 doses, Disp: 30 tablet, Rfl: 0    perphenazine-amitriptyline 2-10 MG TABS, Take 2 tablets by mouth daily., Disp: , Rfl:     potassium chloride (K-DUR) 10 mEq tablet, Take 20 mEq by mouth daily, Disp: , Rfl:     warfarin (COUMADIN) 4 mg tablet, Take 5 mg by mouth daily Monday-friday, Disp: , Rfl:     Updated list was reviewed in [facility] of facility.     Vitals:    07/30/24 1206   BP: 112/66   Pulse: 76   Resp: 18   Temp: (!) 96.6 °F (35.9 °C)   SpO2: 99%       Physical Exam  Constitutional:       General: She is not in acute distress.  HENT:      Head: Normocephalic and atraumatic.   Cardiovascular:      Rate and Rhythm: Normal rate and regular rhythm.      Heart sounds: Murmur heard.   Pulmonary:      Breath sounds: No wheezing, rhonchi or rales.   Abdominal:      General: There is no distension.      Palpations: Abdomen is soft.      Tenderness: There is no abdominal tenderness. There is no guarding.   Musculoskeletal:      Cervical back: Neck supple.      Right lower leg: Edema present.      Left lower leg: Edema present.      Comments: Mild edema involving bilateral lower extremities, slightly more pronounced in left lower extremity   Neurological:      Cranial Nerves: No cranial nerve deficit.      Motor: Weakness present.   Psychiatric:         Mood and Affect: Mood normal.         Behavior: Behavior normal.       Diagnostic Data:    PT/INR was 3 on 729  BMP from 7/24/2024 revealed BUN of 14, creatinine 0.73, sodium 139, potassium 3.8    Discharge instructions:  Follow-up with physician at [facility] within a week.  Follow-up INR and adjust Coumadin dose accordingly.  Monitor weights.  Call MD if there is weight gain of greater than 5 pounds in 1 week or 3 pounds in 2 days    Portions of the record may have  "been created with voice recognition software.  Occasional wrong word or \"sound a like\" substitutions may have occurred due to the inherent limitations of voice recognition software.  Read the chart carefully and recognize, using context, where substitutions have occurred.    This note was electronically signed by Dr. Therese Morales   "

## 2024-07-30 NOTE — ASSESSMENT & PLAN NOTE
Wt Readings from Last 3 Encounters:   07/30/24 74.6 kg (164 lb 6.4 oz)   07/23/24 78.2 kg (172 lb 6.4 oz)   07/17/24 78.6 kg (173 lb 3.2 oz)       Clinically compensated.  Patient has mild edema of bilateral lower extremities.  Weight is down from 167 to 164 pounds.  Continue Lasix 60 mg daily.

## 2024-07-30 NOTE — ASSESSMENT & PLAN NOTE
Lab Results   Component Value Date    HGBA1C 6.4 (H) 12/07/2023    HGBA1C 6.7 (H) 05/11/2023    HGBA1C 7.0 (H) 08/23/2022     Patient is currently not on any insulin or oral hypoglycemic agents.  Check hemoglobin A1c per PCP

## 2024-07-30 NOTE — ASSESSMENT & PLAN NOTE
Heart rate stable on carvedilol.   INR was 3.0 on 7/29/24.  Coumadin dose was adjusted.  Coumadin was held yesterday.  Dose was reduced to 4 mg daily.  Follow-up repeat INR

## 2024-08-06 LAB — INR PPP: 1.3 (ref 0.85–1.19)

## 2024-08-07 ENCOUNTER — ANTICOAG VISIT (OUTPATIENT)
Dept: CARDIOLOGY CLINIC | Facility: CLINIC | Age: 84
End: 2024-08-07

## 2024-08-07 ENCOUNTER — NURSE TRIAGE (OUTPATIENT)
Age: 84
End: 2024-08-07

## 2024-08-07 DIAGNOSIS — I48.11 LONGSTANDING PERSISTENT ATRIAL FIBRILLATION (HCC): Primary | ICD-10-CM

## 2024-08-07 NOTE — TELEPHONE ENCOUNTER
"Reason for Disposition   Caller has URGENT medicine question about med that PCP or specialist prescribed and triager unable to answer question    Answer Assessment - Initial Assessment Questions  1. NAME of MEDICATION: \"What medicine are you calling about?\"       Coumadin   Caprice called from [facility] and had already spoken to Khadra today and is requesting repeat PT/INR tomorrow, but lab will not be in until next Tuesday to have drawn again    They need dosaging instructions on coumadin until that time    C/b [phone]    Protocols used: Medication Question Call-ADULT-[place]    "

## 2024-08-08 ENCOUNTER — ANTICOAG VISIT (OUTPATIENT)
Dept: CARDIOLOGY CLINIC | Facility: CLINIC | Age: 84
End: 2024-08-08

## 2024-08-08 DIAGNOSIS — I48.11 LONGSTANDING PERSISTENT ATRIAL FIBRILLATION (HCC): Primary | ICD-10-CM

## 2024-08-12 ENCOUNTER — HOSPITAL ENCOUNTER (OUTPATIENT)
Dept: NON INVASIVE DIAGNOSTICS | Facility: CLINIC | Age: 84
Discharge: HOME | End: 2024-08-12
Payer: MEDICARE

## 2024-08-12 VITALS — WEIGHT: 164 LBS | HEIGHT: 64 IN | BODY MASS INDEX: 28 KG/M2

## 2024-08-12 DIAGNOSIS — I25.5 CARDIOMYOPATHY, ISCHEMIC: ICD-10-CM

## 2024-08-12 DIAGNOSIS — I50.9 ACUTE EXACERBATION OF CHF (CONGESTIVE HEART FAILURE) (HCC): ICD-10-CM

## 2024-08-12 LAB
NUC STRESS EJECTION FRACTION: 50 %
RATE PRESSURE PRODUCT: 8840
SL CV REST NUCLEAR ISOTOPE DOSE: 11 MCI
SL CV STRESS NUCLEAR ISOTOPE DOSE: 32.9 MCI
SL CV STRESS RECOVERY BP: NORMAL MMHG
SL CV STRESS RECOVERY HR: 60 BPM
SL CV STRESS RECOVERY O2 SAT: 98 %
STRESS ANGINA INDEX: 0
STRESS BASELINE BP: NORMAL MMHG
STRESS BASELINE HR: 57 BPM
STRESS O2 SAT REST: 97 %
STRESS PEAK HR: 65 BPM
STRESS POST O2 SAT PEAK: 98 %
STRESS POST PEAK BP: 136 MMHG
STRESS/REST PERFUSION RATIO: 1

## 2024-08-12 PROCEDURE — A9502 TC99M TETROFOSMIN: HCPCS

## 2024-08-12 PROCEDURE — 93017 CV STRESS TEST TRACING ONLY: CPT

## 2024-08-12 PROCEDURE — 78452 HT MUSCLE IMAGE SPECT MULT: CPT | Performed by: INTERNAL MEDICINE

## 2024-08-12 PROCEDURE — 93018 CV STRESS TEST I&R ONLY: CPT | Performed by: INTERNAL MEDICINE

## 2024-08-12 PROCEDURE — 78452 HT MUSCLE IMAGE SPECT MULT: CPT

## 2024-08-12 PROCEDURE — 93016 CV STRESS TEST SUPVJ ONLY: CPT | Performed by: INTERNAL MEDICINE

## 2024-08-12 RX ORDER — REGADENOSON 0.08 MG/ML
0.4 INJECTION, SOLUTION INTRAVENOUS ONCE
Status: COMPLETED | OUTPATIENT
Start: 2024-08-12 | End: 2024-08-12

## 2024-08-12 RX ADMIN — REGADENOSON 0.4 MG: 0.08 INJECTION, SOLUTION INTRAVENOUS at 14:09

## 2024-08-13 LAB
CHEST PAIN STATEMENT: NORMAL
INR PPP: 1.3 (ref 0.85–1.19)
MAX DIASTOLIC BP: 70 MMHG
MAX PREDICTED HEART RATE: 138 BPM
PROTOCOL NAME: NORMAL
REASON FOR TERMINATION: NORMAL
STRESS POST EXERCISE DUR MIN: 3 MIN
STRESS POST EXERCISE DUR SEC: 0 SEC
STRESS POST PEAK HR: 67 BPM
STRESS POST PEAK SYSTOLIC BP: 136 MMHG
TARGET HR FORMULA: NORMAL
TEST INDICATION: NORMAL

## 2024-08-15 ENCOUNTER — ANTICOAG VISIT (OUTPATIENT)
Dept: CARDIOLOGY CLINIC | Facility: CLINIC | Age: 84
End: 2024-08-15

## 2024-08-15 DIAGNOSIS — I48.11 LONGSTANDING PERSISTENT ATRIAL FIBRILLATION (HCC): Primary | ICD-10-CM

## 2024-08-21 ENCOUNTER — ANTICOAG VISIT (OUTPATIENT)
Dept: CARDIOLOGY CLINIC | Facility: CLINIC | Age: 84
End: 2024-08-21

## 2024-08-21 DIAGNOSIS — I48.11 LONGSTANDING PERSISTENT ATRIAL FIBRILLATION (HCC): Primary | ICD-10-CM

## 2024-08-21 LAB — INR PPP: 1.9 (ref 0.85–1.19)

## 2024-08-27 LAB — INR PPP: 1 (ref 0.85–1.19)

## 2024-08-28 ENCOUNTER — ANTICOAG VISIT (OUTPATIENT)
Dept: CARDIOLOGY CLINIC | Facility: CLINIC | Age: 84
End: 2024-08-28

## 2024-08-28 DIAGNOSIS — I48.11 LONGSTANDING PERSISTENT ATRIAL FIBRILLATION (HCC): Primary | ICD-10-CM

## 2024-08-30 ENCOUNTER — TELEPHONE (OUTPATIENT)
Dept: CARDIOLOGY CLINIC | Facility: CLINIC | Age: 84
End: 2024-08-30

## 2024-08-30 NOTE — TELEPHONE ENCOUNTER
FAXED inr AND COUMADIN ORDER FORM WITH DIAGNOSIS CODE ENTERED. BACK TO [facility] , [phone]

## 2024-09-03 LAB — INR PPP: 2 (ref 0.85–1.19)

## 2024-09-04 ENCOUNTER — ANTICOAG VISIT (OUTPATIENT)
Dept: CARDIOLOGY CLINIC | Facility: CLINIC | Age: 84
End: 2024-09-04

## 2024-09-04 DIAGNOSIS — I48.11 LONGSTANDING PERSISTENT ATRIAL FIBRILLATION (HCC): Primary | ICD-10-CM

## 2024-09-09 DIAGNOSIS — I48.11 LONGSTANDING PERSISTENT ATRIAL FIBRILLATION (HCC): Primary | ICD-10-CM

## 2024-09-09 RX ORDER — WARFARIN SODIUM 4 MG/1
TABLET ORAL
Qty: 30 TABLET | Refills: 1 | Status: SHIPPED | OUTPATIENT
Start: 2024-09-09

## 2024-09-09 NOTE — TELEPHONE ENCOUNTER
Reason for call:   [x] Refill   [] Prior Auth  [] Other:     Office:   [] PCP/Provider -   [x] Specialty/Provider - Cardio    Medication: warfarin (COUMADIN) 4 mg     Dose/Frequency: Take 5 mg by mouth daily Monday-friday     Quantity: 30    Pharmacy: [facility] [address]     Does the patient have enough for 3 days?   [] Yes   [x] No - Send as HP to POD

## 2024-09-11 ENCOUNTER — ANTICOAG VISIT (OUTPATIENT)
Dept: CARDIOLOGY CLINIC | Facility: CLINIC | Age: 84
End: 2024-09-11

## 2024-09-11 DIAGNOSIS — I48.11 LONGSTANDING PERSISTENT ATRIAL FIBRILLATION (HCC): Primary | ICD-10-CM

## 2024-09-11 LAB — INR PPP: 2.3 (ref 0.85–1.19)

## 2024-09-11 NOTE — PROGRESS NOTES
"Advanced Heart Failure/Pulmonary Hypertension Outpatient Note - Nola Welch 84 y.o. female MRN: [id number]    @ Encounter: [id number]      Assessment:  84 y.o. female PMH and acute problems listed later in this note (a partial list may also be included within 'assessment' section) presents for consultation.  I first met Nola Welch on 09/13/24.  Referred by No ref. provider found.     Saw general cardioloyg in 2016. Then saw HF AP 7/2024 who ordered NST:    A pharmacological stress test was performed using regadenoson. The patient experienced no angina during the test.    Stress ECG: The ECG was uninterpretable due to left bundle branch block.    Perfusion: There are no significant perfusion defects.    Stress Function: Left ventricular function post-stress is low-normal. Stress ejection fraction is 50%.  HFmrEF per chart  AF on AC  HTN  HLD  TIA  Fall  Cognitive impairment  Patient Active Problem List   Diagnosis    Atrial fibrillation (HCC)    Acute respiratory failure with hypoxia (HCC)    Depression    Hypertension    Hypothyroidism    HLD (hyperlipidemia)    Fall    Scalp hematoma, initial encounter    Acute combined systolic and diastolic congestive heart failure (HCC)    Erysipelas of both lower extremities    Cognitive impairment    Hypokalemia    Hypomagnesemia    Subtherapeutic INR    Type 2 diabetes mellitus (HCC)    COVID-19         Today I have reviewed all pertinent labs/imaging/data including but not limited to:        Lab Units 07/09/24  0636 07/08/24  1348 07/08/24  0953   CREATININE mg/dL 0.72 0.83 0.80         Lab Results   Component Value Date    K 4.5 07/09/2024     Lab Results   Component Value Date    HGBA1C 6.4 (H) 12/07/2023     Lab Results   Component Value Date    KBZ5XVUHVVQP 1.478 07/04/2024    TSH 2.11 05/11/2023     No results found for: \"LDLCALC\"  Lab Results   Component Value Date     (H) 07/03/2024      Lab Results   Component Value Date    NTBNP 1,149 (H) 11/21/2017 "          TODAY'S PLAN:     09/13/24  I am meeting patient for the first time today  Warm, euvolemic  No new cardiac complaints, feels generally well  She feels much better since moving to NH, regular meds, regular meals, better sleep. Was living on her own before and there was much stress.  Weight diary stable    Neg NST ordered by another provider  We reviewed - reassuring    No Rx changes today    Fu BP trend with her PCP  Reduce anti-HTN Rx as indicated if BP trend drops or dizziness emerges in future    TLC, diet, exercise    Follow up:  With the general cardiology team in 6 mo. This patient does not have advanced heart failure needs at present.  In addition to follow up with their other medical providers    Studies:  Today I have reviewed all pertinent patient data/labs/imaging where available, including but not limited to the below studies. This includes my independent interpretation. Selected results may be displayed here but comprehensive listing is omitted for note clarity and can be found in the epic chart.    ECG.    Echo.    Stress.    Cath.    HPI:   84 y.o. female PMH and acute problems listed later in this note (a partial list may also be included within 'assessment' section) presents for consultation.  No new CP/SOB/dizziness/palpitations/syncope.  No new fatigue.  No new unintentional weight changes.  No new leg swelling, PND, pillow orthopnea.  No new fevers, chills, cough, nausea, vomiting, diarrhea, dysuria.        ROS:  10 point ROS negative except as specified in HPI    Past Medical History:   Diagnosis Date    Anxiety     Asthma     Atrial fibrillation (HCC)     Cardiac disease     CHF (congestive heart failure) (HCC)     Depression 5/22/2016    HLD (hyperlipidemia) 5/22/2016    HTN (hypertension), benign 5/22/2016    Hyperlipidemia     Hypertension     Hypothyroidism 5/22/2016    Stroke (HCC)     Type 2 diabetes mellitus (HCC) 7/8/2024     Patient Active Problem List   Diagnosis    Atrial  fibrillation (HCC)    Acute respiratory failure with hypoxia (HCC)    Depression    Hypertension    Hypothyroidism    HLD (hyperlipidemia)    Fall    Scalp hematoma, initial encounter    Acute combined systolic and diastolic congestive heart failure (HCC)    Erysipelas of both lower extremities    Cognitive impairment    Hypokalemia    Hypomagnesemia    Subtherapeutic INR    Type 2 diabetes mellitus (HCC)    COVID-19     No current facility-administered medications for this visit.      Allergies   Allergen Reactions    Furosemide Diarrhea     DIARRHEA    Penicillins Dermatitis     Social History     Socioeconomic History    Marital status:      Spouse name: Not on file    Number of children: Not on file    Years of education: Not on file    Highest education level: Not on file   Occupational History    Not on file   Tobacco Use    Smoking status: Former     Current packs/day: 0.00     Types: Cigarettes     Quit date:      Years since quittin.7    Smokeless tobacco: Never   Substance and Sexual Activity    Alcohol use: No    Drug use: No    Sexual activity: Not on file   Other Topics Concern    Not on file   Social History Narrative    Not on file     Social Determinants of Health     Financial Resource Strain: Not on file   Food Insecurity: No Food Insecurity (2024)    Hunger Vital Sign     Worried About Running Out of Food in the Last Year: Never true     Ran Out of Food in the Last Year: Never true   Transportation Needs: No Transportation Needs (2024)    PRAPARE - Transportation     Lack of Transportation (Medical): No     Lack of Transportation (Non-Medical): No   Physical Activity: Not on file   Stress: Not on file   Social Connections: Not on file   Intimate Partner Violence: Not on file   Housing Stability: Low Risk  (2024)    Housing Stability Vital Sign     Unable to Pay for Housing in the Last Year: No     Number of Times Moved in the Last Year: 0     Homeless in the Last Year:  "No     Family History   Problem Relation Age of Onset    No Known Problems Mother     No Known Problems Father     No Known Problems Sister     Stomach cancer Maternal Grandmother 69    No Known Problems Maternal Grandfather     No Known Problems Paternal Grandmother     No Known Problems Paternal Grandfather        Physical Exam:  Vitals:    09/13/24 0912   BP: 114/68   BP Location: Left arm   Patient Position: Sitting   Cuff Size: Standard   Pulse: 69   SpO2: 96%   Weight: 78.6 kg (173 lb 3.2 oz)   Height: 5' 4\" (1.626 m)     Constitutional: NAD, non toxic  Ears/nose/mouth/throat: atraumatic  CV: RRR, no JVD, no HJR  Resp: CTABL  GI: Soft, NTND  MSK: no swollen joints in exposed areas  Extr: No edema, warm LE  Pysche: Normal affect  Neuro: generally appropriate in conversation  Skin: dry and intact in exposed areas    Labs & Results:  Lab Results   Component Value Date    WBC 8.22 07/09/2024    HGB 13.6 07/09/2024    HCT 42.8 07/09/2024    MCV 92 07/09/2024     07/09/2024     Lab Results   Component Value Date    SODIUM 137 07/09/2024    K 4.5 07/09/2024     07/09/2024    CO2 29 07/09/2024    BUN 14 07/09/2024    CREATININE 0.72 07/09/2024    GLUC 122 07/09/2024    CALCIUM 10.0 07/09/2024       Counseling / Coordination of Care  Greater than 50% of total time was spent with the patient and / or family counseling and / or coordination of care. Discussion included diagnoses, most recent studies and any changes in treatment.    Thank you for the opportunity to participate in the care of this patient.    Selvin Mora MD  Attending Physician  Advanced Heart Failure and Transplant Cardiology  [facility]  "

## 2024-09-13 ENCOUNTER — TELEPHONE (OUTPATIENT)
Age: 84
End: 2024-09-13

## 2024-09-13 ENCOUNTER — OFFICE VISIT (OUTPATIENT)
Dept: CARDIOLOGY CLINIC | Facility: CLINIC | Age: 84
End: 2024-09-13
Payer: MEDICARE

## 2024-09-13 VITALS
DIASTOLIC BLOOD PRESSURE: 68 MMHG | BODY MASS INDEX: 29.57 KG/M2 | WEIGHT: 173.2 LBS | HEIGHT: 64 IN | OXYGEN SATURATION: 96 % | HEART RATE: 69 BPM | SYSTOLIC BLOOD PRESSURE: 114 MMHG

## 2024-09-13 DIAGNOSIS — I48.11 LONGSTANDING PERSISTENT ATRIAL FIBRILLATION (HCC): ICD-10-CM

## 2024-09-13 DIAGNOSIS — I10 PRIMARY HYPERTENSION: ICD-10-CM

## 2024-09-13 DIAGNOSIS — I50.22 HEART FAILURE WITH MILDLY REDUCED EJECTION FRACTION (HFMREF) (HCC): Primary | ICD-10-CM

## 2024-09-13 PROCEDURE — 99204 OFFICE O/P NEW MOD 45 MIN: CPT | Performed by: STUDENT IN AN ORGANIZED HEALTH CARE EDUCATION/TRAINING PROGRAM

## 2024-09-13 NOTE — TELEPHONE ENCOUNTER
Caller: [facility]    Doctor: Abby    Reason for call: [facility] is calling with regards to INR ordered for 9/18/24. They state they have a service that comes to their facility to draw the samples and is requesting the order be faxed to them at [phone] so they can proceed with the testing.    Call back#: [phone]

## 2024-09-17 LAB — INR PPP: 2.2 (ref 0.85–1.19)

## 2024-09-18 ENCOUNTER — ANTICOAG VISIT (OUTPATIENT)
Dept: CARDIOLOGY CLINIC | Facility: CLINIC | Age: 84
End: 2024-09-18

## 2024-09-18 DIAGNOSIS — I48.11 LONGSTANDING PERSISTENT ATRIAL FIBRILLATION (HCC): Primary | ICD-10-CM

## 2024-09-27 DIAGNOSIS — I48.11 LONGSTANDING PERSISTENT ATRIAL FIBRILLATION (HCC): Primary | ICD-10-CM

## 2024-09-27 RX ORDER — WARFARIN SODIUM 4 MG/1
TABLET ORAL
Qty: 3 TABLET | Status: SHIPPED | OUTPATIENT
Start: 2024-09-27

## 2024-09-27 RX ORDER — WARFARIN SODIUM 6 MG/1
TABLET ORAL
Qty: 4 TABLET | Status: SHIPPED | OUTPATIENT
Start: 2024-09-27

## 2024-10-01 LAB — INR PPP: 2.9 (ref 0.85–1.19)

## 2024-10-02 ENCOUNTER — ANTICOAG VISIT (OUTPATIENT)
Dept: CARDIOLOGY CLINIC | Facility: CLINIC | Age: 84
End: 2024-10-02

## 2024-10-02 DIAGNOSIS — I48.11 LONGSTANDING PERSISTENT ATRIAL FIBRILLATION (HCC): Primary | ICD-10-CM

## 2024-10-15 LAB — INR PPP: 2.5 (ref 0.85–1.19)

## 2024-10-16 ENCOUNTER — ANTICOAG VISIT (OUTPATIENT)
Dept: CARDIOLOGY CLINIC | Facility: CLINIC | Age: 84
End: 2024-10-16

## 2024-10-16 DIAGNOSIS — I48.11 LONGSTANDING PERSISTENT ATRIAL FIBRILLATION (HCC): Primary | ICD-10-CM

## 2024-10-29 LAB — INR PPP: 2.8 (ref 0.85–1.19)

## 2024-10-30 ENCOUNTER — ANTICOAG VISIT (OUTPATIENT)
Dept: CARDIOLOGY CLINIC | Facility: CLINIC | Age: 84
End: 2024-10-30

## 2024-10-30 DIAGNOSIS — I48.11 LONGSTANDING PERSISTENT ATRIAL FIBRILLATION (HCC): Primary | ICD-10-CM

## 2024-11-19 LAB — INR PPP: 2.9 (ref 0.85–1.19)

## 2024-11-20 ENCOUNTER — ANTICOAG VISIT (OUTPATIENT)
Dept: CARDIOLOGY CLINIC | Facility: CLINIC | Age: 84
End: 2024-11-20

## 2024-11-20 DIAGNOSIS — I48.11 LONGSTANDING PERSISTENT ATRIAL FIBRILLATION (HCC): Primary | ICD-10-CM

## 2024-11-21 ENCOUNTER — APPOINTMENT (EMERGENCY)
Dept: CT IMAGING | Facility: HOSPITAL | Age: 84
End: 2024-11-21
Payer: MEDICARE

## 2024-11-21 ENCOUNTER — APPOINTMENT (EMERGENCY)
Dept: RADIOLOGY | Facility: HOSPITAL | Age: 84
End: 2024-11-21
Payer: MEDICARE

## 2024-11-21 ENCOUNTER — HOSPITAL ENCOUNTER (EMERGENCY)
Facility: HOSPITAL | Age: 84
Discharge: HOME | End: 2024-11-21
Attending: EMERGENCY MEDICINE
Payer: MEDICARE

## 2024-11-21 VITALS
TEMPERATURE: 98.3 F | HEIGHT: 64 IN | HEART RATE: 78 BPM | DIASTOLIC BLOOD PRESSURE: 107 MMHG | OXYGEN SATURATION: 96 % | BODY MASS INDEX: 31.24 KG/M2 | SYSTOLIC BLOOD PRESSURE: 169 MMHG | WEIGHT: 182.98 LBS | RESPIRATION RATE: 18 BRPM

## 2024-11-21 DIAGNOSIS — I48.11 LONGSTANDING PERSISTENT ATRIAL FIBRILLATION (HCC): ICD-10-CM

## 2024-11-21 DIAGNOSIS — I10 PRIMARY HYPERTENSION: Primary | ICD-10-CM

## 2024-11-21 LAB
2HR DELTA HS TROPONIN: 1 NG/L
ALBUMIN SERPL BCG-MCNC: 4.3 G/DL (ref 3.5–5)
ALP SERPL-CCNC: 105 U/L (ref 34–104)
ALT SERPL W P-5'-P-CCNC: 13 U/L (ref 7–52)
AMMONIA PLAS-SCNC: 22 UMOL/L (ref 18–72)
ANION GAP SERPL CALCULATED.3IONS-SCNC: 9 MMOL/L (ref 4–13)
APTT PPP: 45 SECONDS (ref 23–34)
AST SERPL W P-5'-P-CCNC: 14 U/L (ref 13–39)
ATRIAL RATE: 202 BPM
BACTERIA UR QL AUTO: NORMAL /HPF
BASOPHILS # BLD AUTO: 0.06 THOUSANDS/ÂΜL (ref 0–0.1)
BASOPHILS NFR BLD AUTO: 1 % (ref 0–1)
BILIRUB SERPL-MCNC: 1.24 MG/DL (ref 0.2–1)
BILIRUB UR QL STRIP: NEGATIVE
BUN SERPL-MCNC: 23 MG/DL (ref 5–25)
CALCIUM SERPL-MCNC: 10.4 MG/DL (ref 8.4–10.2)
CARDIAC TROPONIN I PNL SERPL HS: 10 NG/L (ref ?–50)
CARDIAC TROPONIN I PNL SERPL HS: 11 NG/L (ref ?–50)
CHLORIDE SERPL-SCNC: 103 MMOL/L (ref 96–108)
CLARITY UR: CLEAR
CO2 SERPL-SCNC: 28 MMOL/L (ref 21–32)
COLOR UR: YELLOW
CREAT SERPL-MCNC: 0.78 MG/DL (ref 0.6–1.3)
EOSINOPHIL # BLD AUTO: 0.27 THOUSAND/ÂΜL (ref 0–0.61)
EOSINOPHIL NFR BLD AUTO: 3 % (ref 0–6)
ERYTHROCYTE [DISTWIDTH] IN BLOOD BY AUTOMATED COUNT: 14.3 % (ref 11.6–15.1)
FLUAV AG UPPER RESP QL IA.RAPID: NEGATIVE
FLUBV AG UPPER RESP QL IA.RAPID: NEGATIVE
GFR SERPL CREATININE-BSD FRML MDRD: 71 ML/MIN/1.73SQ M
GLUCOSE SERPL-MCNC: 128 MG/DL (ref 65–140)
GLUCOSE SERPL-MCNC: 144 MG/DL (ref 65–140)
GLUCOSE UR STRIP-MCNC: NEGATIVE MG/DL
HCT VFR BLD AUTO: 43.1 % (ref 34.8–46.1)
HGB BLD-MCNC: 14.1 G/DL (ref 11.5–15.4)
HGB UR QL STRIP.AUTO: NEGATIVE
IMM GRANULOCYTES # BLD AUTO: 0.03 THOUSAND/UL (ref 0–0.2)
IMM GRANULOCYTES NFR BLD AUTO: 0 % (ref 0–2)
INR PPP: 2.84 (ref 0.85–1.19)
KETONES UR STRIP-MCNC: NEGATIVE MG/DL
LEUKOCYTE ESTERASE UR QL STRIP: NEGATIVE
LYMPHOCYTES # BLD AUTO: 1.57 THOUSANDS/ÂΜL (ref 0.6–4.47)
LYMPHOCYTES NFR BLD AUTO: 18 % (ref 14–44)
MCH RBC QN AUTO: 29.3 PG (ref 26.8–34.3)
MCHC RBC AUTO-ENTMCNC: 32.7 G/DL (ref 31.4–37.4)
MCV RBC AUTO: 89 FL (ref 82–98)
MONOCYTES # BLD AUTO: 0.9 THOUSAND/ÂΜL (ref 0.17–1.22)
MONOCYTES NFR BLD AUTO: 10 % (ref 4–12)
NEUTROPHILS # BLD AUTO: 6.15 THOUSANDS/ÂΜL (ref 1.85–7.62)
NEUTS SEG NFR BLD AUTO: 68 % (ref 43–75)
NITRITE UR QL STRIP: NEGATIVE
NON-SQ EPI CELLS URNS QL MICRO: NORMAL /HPF
NRBC BLD AUTO-RTO: 0 /100 WBCS
PH UR STRIP.AUTO: 6 [PH]
PLATELET # BLD AUTO: 197 THOUSANDS/UL (ref 149–390)
PMV BLD AUTO: 10.7 FL (ref 8.9–12.7)
POTASSIUM SERPL-SCNC: 3.9 MMOL/L (ref 3.5–5.3)
PROT SERPL-MCNC: 7.4 G/DL (ref 6.4–8.4)
PROT UR STRIP-MCNC: ABNORMAL MG/DL
PROTHROMBIN TIME: 30.5 SECONDS (ref 12.3–15)
QRS AXIS: 254 DEGREES
QRSD INTERVAL: 154 MS
QT INTERVAL: 450 MS
QTC INTERVAL: 502 MS
RBC # BLD AUTO: 4.82 MILLION/UL (ref 3.81–5.12)
RBC #/AREA URNS AUTO: NORMAL /HPF
SARS-COV+SARS-COV-2 AG RESP QL IA.RAPID: NEGATIVE
SODIUM SERPL-SCNC: 140 MMOL/L (ref 135–147)
SP GR UR STRIP.AUTO: 1.02 (ref 1–1.03)
T WAVE AXIS: 91 DEGREES
TSH SERPL DL<=0.05 MIU/L-ACNC: 4.32 UIU/ML (ref 0.45–4.5)
UROBILINOGEN UR STRIP-ACNC: <2 MG/DL
VENTRICULAR RATE: 75 BPM
WBC # BLD AUTO: 8.98 THOUSAND/UL (ref 4.31–10.16)
WBC #/AREA URNS AUTO: NORMAL /HPF

## 2024-11-21 PROCEDURE — 82948 REAGENT STRIP/BLOOD GLUCOSE: CPT

## 2024-11-21 PROCEDURE — 70450 CT HEAD/BRAIN W/O DYE: CPT

## 2024-11-21 PROCEDURE — 81001 URINALYSIS AUTO W/SCOPE: CPT | Performed by: STUDENT IN AN ORGANIZED HEALTH CARE EDUCATION/TRAINING PROGRAM

## 2024-11-21 PROCEDURE — 85730 THROMBOPLASTIN TIME PARTIAL: CPT | Performed by: STUDENT IN AN ORGANIZED HEALTH CARE EDUCATION/TRAINING PROGRAM

## 2024-11-21 PROCEDURE — 36415 COLL VENOUS BLD VENIPUNCTURE: CPT | Performed by: STUDENT IN AN ORGANIZED HEALTH CARE EDUCATION/TRAINING PROGRAM

## 2024-11-21 PROCEDURE — 85610 PROTHROMBIN TIME: CPT | Performed by: STUDENT IN AN ORGANIZED HEALTH CARE EDUCATION/TRAINING PROGRAM

## 2024-11-21 PROCEDURE — 93010 ELECTROCARDIOGRAM REPORT: CPT | Performed by: INTERNAL MEDICINE

## 2024-11-21 PROCEDURE — 93005 ELECTROCARDIOGRAM TRACING: CPT

## 2024-11-21 PROCEDURE — 84484 ASSAY OF TROPONIN QUANT: CPT | Performed by: STUDENT IN AN ORGANIZED HEALTH CARE EDUCATION/TRAINING PROGRAM

## 2024-11-21 PROCEDURE — 85025 COMPLETE CBC W/AUTO DIFF WBC: CPT | Performed by: STUDENT IN AN ORGANIZED HEALTH CARE EDUCATION/TRAINING PROGRAM

## 2024-11-21 PROCEDURE — 87811 SARS-COV-2 COVID19 W/OPTIC: CPT | Performed by: STUDENT IN AN ORGANIZED HEALTH CARE EDUCATION/TRAINING PROGRAM

## 2024-11-21 PROCEDURE — 80053 COMPREHEN METABOLIC PANEL: CPT | Performed by: STUDENT IN AN ORGANIZED HEALTH CARE EDUCATION/TRAINING PROGRAM

## 2024-11-21 PROCEDURE — 71046 X-RAY EXAM CHEST 2 VIEWS: CPT

## 2024-11-21 PROCEDURE — 87804 INFLUENZA ASSAY W/OPTIC: CPT | Performed by: STUDENT IN AN ORGANIZED HEALTH CARE EDUCATION/TRAINING PROGRAM

## 2024-11-21 PROCEDURE — 82140 ASSAY OF AMMONIA: CPT | Performed by: STUDENT IN AN ORGANIZED HEALTH CARE EDUCATION/TRAINING PROGRAM

## 2024-11-21 PROCEDURE — 99285 EMERGENCY DEPT VISIT HI MDM: CPT

## 2024-11-21 PROCEDURE — 99285 EMERGENCY DEPT VISIT HI MDM: CPT | Performed by: EMERGENCY MEDICINE

## 2024-11-21 PROCEDURE — 84443 ASSAY THYROID STIM HORMONE: CPT | Performed by: STUDENT IN AN ORGANIZED HEALTH CARE EDUCATION/TRAINING PROGRAM

## 2024-11-21 RX ORDER — LEVOTHYROXINE SODIUM 50 UG/1
50 TABLET ORAL
Status: DISCONTINUED | OUTPATIENT
Start: 2024-11-21 | End: 2024-11-21 | Stop reason: HOSPADM

## 2024-11-21 RX ADMIN — LEVOTHYROXINE SODIUM 50 MCG: 50 TABLET ORAL at 08:41

## 2024-11-21 NOTE — ED ATTENDING ATTESTATION
11/21/2024  I, Kulwinder Randall MD, saw and evaluated the patient. I have discussed the patient with the resident/non-physician practitioner and agree with the resident's/non-physician practitioner's findings, Plan of Care, and MDM as documented in the resident's/non-physician practitioner's note, except where noted. All available labs and Radiology studies were reviewed.  I was present for key portions of any procedure(s) performed by the resident/non-physician practitioner and I was immediately available to provide assistance.       At this point I agree with the current assessment done in the Emergency Department.  I have conducted an independent evaluation of this patient a history and physical is as follows: Patient is a 84 year old female who was sent from nursing facility [facility] for confusion. Patient has a h/o cognitive impairment. Patient is on coumadin for a. Fib. Denies chest pain, sob, fever, N/V. No other complaints. Was last seen at  Cardiology in [place] on 9/13/24 for heart failure. [facility] website checked on this patient and no Rx found. NCAT. PERRL. No scleral icterus. Mucous membranes somewhat moist. Lungs clear. Heart irregularly irregular. Not tachycardic. Abdomen soft and nontender. Good bowel sounds. (+) mild bilateral LE edema. No focal deficits. AAOX3. No rash noted. DDx including but not limited to: metabolic abnormality, intracranial etiology, cardiac etiology, hypercarbia, hypoxia, infectious etiology including UTI, thyroid disease, hyperammonemia, delirium, dementia, overmedication. I reviewed EKG. Will check labs, CXR and CT head.     ED Course         Critical Care Time  Procedures

## 2024-11-21 NOTE — ED PROVIDER NOTES
Time reflects when diagnosis was documented in both MDM as applicable and the Disposition within this note       Time User Action Codes Description Comment    11/21/2024  8:40 AM AsencioYuri Luis Chung [I10] Primary hypertension     11/21/2024  8:40 AM AsencioYuri Luis Add [Z86.39] History of hypothyroidism     11/21/2024  8:53 AM Yuri Asencio Remove [Z86.39] History of hypothyroidism     11/21/2024  8:53 AM AsencioYuri Luis Add [I48.11] Longstanding persistent atrial fibrillation (HCC)           ED Disposition       ED Disposition   Discharge    Condition   Stable    Date/Time   Thu Nov 21, 2024  8:53 AM    Comment   Nola LEAL Yuri discharge to home/self care.                   Assessment & Plan       Medical Decision Making  Patient presents with:  Altered Mental Status: Pt arrived [facility]. Pt very scattered on arrival states she has a broken leg and that the ambulance fixed it and notes that she has been seeing double since the year 2424. Denies pain at time of arrival.   DDx includes AMS, ACS, electrolyte derangement, CVA, UTI, thyroid derangement, infection of unknown origin  Workup per ED course: Workup reassuring, no evidence of systemic infection, A-fib is stable, imaging without acute cardiopulmonary process or ICH.  Patient symptomatically improved during ED course.  Daughter updated via phone regarding reassuring workup.  Patient request discharge home and will follow-up with her PCP.  Dispo: Workup and return precautions reviewed. Patient expresses understanding, is comfortable with discharge, aggress to follow-up as advised and return to ED if symptoms recur.     Amount and/or Complexity of Data Reviewed  Labs: ordered. Decision-making details documented in ED Course.  Radiology: ordered and independent interpretation performed. Decision-making details documented in ED Course.    Risk  Prescription drug management.        ED Course as of 11/21/24 3254    Thu Nov 21, 2024   0526 Presents from [facility] via EMS after staff noted unusual behavior concern for AMS.  On evaluation she is alert and oriented, however history limited by tangential responses.  No apparent focal deficit, signs of trauma or infection.     Nonfocal exam without distress cardiopulmonary unremarkable.  No abdominal tenderness    C/f AMS, ACS, electrolyte derangement, CVA,     Plan AMS w/u with CT head.    0636 PTT(!): 45   0637 POCT INR(!): 2.84  On warfarin   0637 PROTIME(!): 30.5   0641 FLU/COVID Rapid Antigen (30 min. TAT) - Preferred screening test in ED       0641 POC Glucose: 128       0641 WBC: 8.98   0641 Hemoglobin: 14.1   0641 Ammonia: 22   0641 TSH 3RD GENERATON: 4.317   0641 hs TnI 0hr: 10   0750 CT head without contrast  No acute intracranial abnormality.     0831 Urine Microscopic  Less likely UTI   0836 hs TnI 2hr: 11  Delta +1   0854 Daughter updated via phone on reassuring workup and presentation.  Contact information added to patient's chart        Medications   levothyroxine tablet 50 mcg (50 mcg Oral Given 11/21/24 0841)       ED Risk Strat Scores                           SBIRT 22yo+      Flowsheet Row Most Recent Value   Initial Alcohol Screen: US AUDIT-C     1. How often do you have a drink containing alcohol? 0 Filed at: 11/21/2024 0639   2. How many drinks containing alcohol do you have on a typical day you are drinking?  0 Filed at: 11/21/2024 0639   3b. FEMALE Any Age, or MALE 65+: How often do you have 4 or more drinks on one occassion? 0 Filed at: 11/21/2024 0639   Audit-C Score 0 Filed at: 11/21/2024 0639   WENCESLAO: How many times in the past year have you...    Used an illegal drug or used a prescription medication for non-medical reasons? Never Filed at: 11/21/2024 0639                            History of Present Illness       Chief Complaint   Patient presents with    Altered Mental Status     Pt arrived via EMS for AMS, from [facility]. Pt very scattered on arrival states  she has a broken leg and that the ambulance fixed it and notes that she has been seeing double since the year . Denies pain at time of arrival.        Past Medical History:   Diagnosis Date    Anxiety     Asthma     Atrial fibrillation (HCC)     Cardiac disease     CHF (congestive heart failure) (HCC)     Depression 2016    HLD (hyperlipidemia) 2016    HTN (hypertension), benign 2016    Hyperlipidemia     Hypertension     Hypothyroidism 2016    Stroke (HCC)     Type 2 diabetes mellitus (HCC) 2024      Past Surgical History:   Procedure Laterality Date    BREAST EXCISIONAL BIOPSY Right 2001    benign    TONSILLECTOMY      TOTAL ABDOMINAL HYSTERECTOMY Bilateral     age 54    TOTAL ABDOMINAL HYSTERECTOMY W/ BILATERAL SALPINGOOPHORECTOMY Bilateral     age 54      Family History   Problem Relation Age of Onset    No Known Problems Mother     No Known Problems Father     No Known Problems Sister     Stomach cancer Maternal Grandmother 69    No Known Problems Maternal Grandfather     No Known Problems Paternal Grandmother     No Known Problems Paternal Grandfather       Social History     Tobacco Use    Smoking status: Former     Current packs/day: 0.00     Types: Cigarettes     Quit date:      Years since quittin.9    Smokeless tobacco: Never   Vaping Use    Vaping status: Never Used   Substance Use Topics    Alcohol use: No    Drug use: No      E-Cigarette/Vaping    E-Cigarette Use Never User       E-Cigarette/Vaping Substances    Nicotine No     THC No     CBD No     Flavoring No     Other No     Unknown No       I have reviewed and agree with the history as documented.     Nola Welch is a 84 y.o. female from assisted living facility via EMS after staff noted unusual behavior concern for AMS.  On evaluation she is alert and oriented, however history limited by tangential responses.  No apparent focal deficit, signs of trauma or infection.  Denies Fever/Chills,  Nausea/vomiting, Headache/vision changes, SOB/Chest pain, hemoptysis/hematochezia, GI/ symptoms, or any other complaint at this time.      Altered Mental Status      Review of Systems   All other systems reviewed and are negative.          Objective       ED Triage Vitals   Temperature Pulse Blood Pressure Respirations SpO2 Patient Position - Orthostatic VS   11/21/24 0518 11/21/24 0457 11/21/24 0457 11/21/24 0457 11/21/24 0457 --   98.3 °F (36.8 °C) 75 (!) 178/102 16 93 %       Temp Source Heart Rate Source BP Location FiO2 (%) Pain Score    11/21/24 0518 11/21/24 0515 -- -- 11/21/24 0457    Oral Monitor   No Pain      Vitals      Date and Time Temp Pulse SpO2 Resp BP Pain Score FACES Pain Rating User   11/21/24 0830 -- 78 96 % -- 169/107 No Pain -- RANDALL   11/21/24 0611 -- -- -- -- -- No Pain -- CC   11/21/24 0518 98.3 °F (36.8 °C) -- -- -- -- -- -- CC   11/21/24 0515 -- 75 94 % 18 187/86 -- -- CC   11/21/24 0457 -- 75 93 % 16 178/102 No Pain -- CC            Physical Exam    General appearance: resting comfortably, no acute distress   HENT: Normocephalic, atraumatic, hearing grossly intact, and mucous membranes moist. Upper dentures in place  Eyes: Conjunctiva normal, PERRL, EOM intact   Lungs/Chest: Respirations even and unlabored, breath sounds normal  Cardiovascular: Normal rate, regular rhythm  Abdomen: soft, non-distended, non-tender  Musculoskeletal: extremities normal, atraumatic, no cyanosis or edema   Skin: warm and dry   Neurologic: Awake and alert, no apparent focal deficit  Psych: Mood consistent with affect       Results Reviewed       Procedure Component Value Units Date/Time    HS Troponin I 2hr [[id number]]  (Normal) Collected: 11/21/24 0755    Lab Status: Final result Specimen: Blood from Arm, Right Updated: 11/21/24 0831     hs TnI 2hr 11 ng/L      Delta 2hr hsTnI 1 ng/L     Urine Microscopic [[id number]]  (Normal) Collected: 11/21/24 0707    Lab Status: Final result Specimen: Urine, Straight Cath  Updated: 11/21/24 0721     RBC, UA 1-2 /hpf      WBC, UA 1-2 /hpf      Epithelial Cells Occasional /hpf      Bacteria, UA None Seen /hpf     UA w Reflex to Microscopic w Reflex to Culture [[id number]]  (Abnormal) Collected: 11/21/24 0707    Lab Status: Final result Specimen: Urine, Straight Cath Updated: 11/21/24 0720     Color, UA Yellow     Clarity, UA Clear     Specific Gravity, UA 1.021     pH, UA 6.0     Leukocytes, UA Negative     Nitrite, UA Negative     Protein, UA Trace mg/dl      Glucose, UA Negative mg/dl      Ketones, UA Negative mg/dl      Urobilinogen, UA <2.0 mg/dl      Bilirubin, UA Negative     Occult Blood, UA Negative    Comprehensive metabolic panel [[id number]]  (Abnormal) Collected: 11/21/24 0529    Lab Status: Final result Specimen: Blood from Arm, Right Updated: 11/21/24 0639     Sodium 140 mmol/L      Potassium 3.9 mmol/L      Chloride 103 mmol/L      CO2 28 mmol/L      ANION GAP 9 mmol/L      BUN 23 mg/dL      Creatinine 0.78 mg/dL      Glucose 144 mg/dL      Calcium 10.4 mg/dL      AST 14 U/L      ALT 13 U/L      Alkaline Phosphatase 105 U/L      Total Protein 7.4 g/dL      Albumin 4.3 g/dL      Total Bilirubin 1.24 mg/dL      eGFR 71 ml/min/1.73sq m     Narrative:      National Kidney Disease Foundation guidelines for Chronic Kidney Disease (CKD):     Stage 1 with normal or high GFR (GFR > 90 mL/min/1.73 square meters)    Stage 2 Mild CKD (GFR = 60-89 mL/min/1.73 square meters)    Stage 3A Moderate CKD (GFR = 45-59 mL/min/1.73 square meters)    Stage 3B Moderate CKD (GFR = 30-44 mL/min/1.73 square meters)    Stage 4 Severe CKD (GFR = 15-29 mL/min/1.73 square meters)    Stage 5 End Stage CKD (GFR <15 mL/min/1.73 square meters)  Note: GFR calculation is accurate only with a steady state creatinine    HS Troponin 0hr (reflex protocol) [[id number]]  (Normal) Collected: 11/21/24 0529    Lab Status: Final result Specimen: Blood from Arm, Right Updated: 11/21/24 0621     hs TnI 0hr 10 ng/L     TSH,  3rd generation with Free T4 reflex [[id number]]  (Normal) Collected: 11/21/24 0529    Lab Status: Final result Specimen: Blood from Arm, Right Updated: 11/21/24 0620     TSH 3RD GENERATON 4.317 uIU/mL     Protime-INR [[id number]]  (Abnormal) Collected: 11/21/24 0529    Lab Status: Final result Specimen: Blood from Arm, Right Updated: 11/21/24 0609     Protime 30.5 seconds      INR 2.84    Narrative:      INR Therapeutic Range    Indication                                             INR Range      Atrial Fibrillation                                               2.0-3.0  Hypercoagulable State                                    2.0.2.3  Left Ventricular Asist Device                            2.0-3.0  Mechanical Heart Valve                                  -    Aortic(with afib, MI, embolism, HF, LA enlargement,    and/or coagulopathy)                                     2.0-3.0 (2.5-3.5)     Mitral                                                             2.5-3.5  Prosthetic/Bioprosthetic Heart Valve               2.0-3.0  Venous thromboembolism (VTE: VT, PE        2.0-3.0    APTT [[id number]]  (Abnormal) Collected: 11/21/24 0529    Lab Status: Final result Specimen: Blood from Arm, Right Updated: 11/21/24 0609     PTT 45 seconds     Ammonia [[id number]]  (Normal) Collected: 11/21/24 0529    Lab Status: Final result Specimen: Blood from Arm, Right Updated: 11/21/24 0603     Ammonia 22 umol/L     FLU/COVID Rapid Antigen (30 min. TAT) - Preferred screening test in ED [[id number]]  (Normal) Collected: 11/21/24 0529    Lab Status: Final result Specimen: Nares from Nose Updated: 11/21/24 0559     SARS COV Rapid Antigen Negative     Influenza A Rapid Antigen Negative     Influenza B Rapid Antigen Negative    Narrative:      This test has been performed using the [employer] Mari 2 FLU+SARS Antigen test under the Emergency Use Authorization (EUA). This test has been validated by the  and verified by the performing  laboratory. The Mari uses lateral flow immunofluorescent sandwich assay to detect SARS-COV, Influenza A and Influenza B Antigen.     The [employer] Mari 2 SARS Antigen test does not differentiate between SARS-CoV and SARS-CoV-2.     Negative results are presumptive and may be confirmed with a molecular assay, if necessary, for patient management. Negative results do not rule out SARS-CoV-2 or influenza infection and should not be used as the sole basis for treatment or patient management decisions. A negative test result may occur if the level of antigen in a sample is below the limit of detection of this test.     Positive results are indicative of the presence of viral antigens, but do not rule out bacterial infection or co-infection with other viruses.     All test results should be used as an adjunct to clinical observations and other information available to the provider.    FOR PEDIATRIC PATIENTS - copy/paste COVID Guidelines URL to browser: [email or web address]    CBC and differential [[id number]] Collected: 11/21/24 0529    Lab Status: Final result Specimen: Blood from Arm, Right Updated: 11/21/24 0546     WBC 8.98 Thousand/uL      RBC 4.82 Million/uL      Hemoglobin 14.1 g/dL      Hematocrit 43.1 %      MCV 89 fL      MCH 29.3 pg      MCHC 32.7 g/dL      RDW 14.3 %      MPV 10.7 fL      Platelets 197 Thousands/uL      nRBC 0 /100 WBCs      Segmented % 68 %      Immature Grans % 0 %      Lymphocytes % 18 %      Monocytes % 10 %      Eosinophils Relative 3 %      Basophils Relative 1 %      Absolute Neutrophils 6.15 Thousands/µL      Absolute Immature Grans 0.03 Thousand/uL      Absolute Lymphocytes 1.57 Thousands/µL      Absolute Monocytes 0.90 Thousand/µL      Eosinophils Absolute 0.27 Thousand/µL      Basophils Absolute 0.06 Thousands/µL     Fingerstick Glucose (POCT) [[id number]]  (Normal) Collected: 11/21/24 0537    Lab Status: Final result Specimen:  Blood Updated: 11/21/24 0538     POC Glucose 128 mg/dl             CT head without contrast   Final Interpretation by William Parr MD (11/21 0743)      No acute intracranial abnormality.                  Workstation performed: [id number]         XR chest 2 views   ED Interpretation by Yuri Asencio MD (11/21 0727)   No acute cardiopulmonary process.  Cardiac enlargement appears stable from prior chest x-ray      Final Interpretation by Mookie Mcdonald MD (11/21 0820)      No acute cardiopulmonary disease.            Workstation performed: [id number]             ECG 12 Lead Documentation Only    Date/Time: 11/21/2024 9:03 AM    Performed by: Yuri Asencio MD  Authorized by: Yuri Asencio MD    ECG reviewed by me, the ED Provider: yes    Patient location:  ED  Previous ECG:     Previous ECG:  Compared to current  Interpretation:     Interpretation: abnormal    Rate:     ECG rate:  75    ECG rate assessment: normal    Rhythm:     Rhythm: atrial fibrillation    QRS:     QRS axis:  Right    QRS intervals:  Wide  Conduction:     Conduction: abnormal      Abnormal conduction: non-specific intraventricular conduction delay    ST segments:     ST segments:  Normal  T waves:     T waves: normal        ED Medication and Procedure Management   Prior to Admission Medications   Prescriptions Last Dose Informant Patient Reported? Taking?   atorvastatin (LIPITOR) 40 mg tablet  Self, Outside Facility (Specify) Yes No   Sig: Take 40 mg by mouth daily   carvedilol (COREG) 12.5 mg tablet  Self, Outside Facility (Specify) No No   Sig: Take 1 tablet (12.5 mg total) by mouth 2 (two) times a day with meals   furosemide (LASIX) 40 mg tablet  Self, Outside Facility (Specify) No No   Sig: Take 1.5 tablets (60 mg total) by mouth daily   levothyroxine 50 mcg tablet  Self, Outside Facility (Specify) Yes No   Sig: Take 50 mcg by mouth daily.   lisinopril (ZESTRIL) 30 mg tablet  Self, Outside Facility  (Specify) No No   Sig: Take 1 tablet (30 mg total) by mouth daily for 30 doses   perphenazine-amitriptyline 2-10 MG TABS  Self, Outside Facility (Specify) Yes No   Sig: Take 2 tablets by mouth daily.   potassium chloride (K-DUR) 10 mEq tablet  Self, Outside Facility (Specify) Yes No   Sig: Take 20 mEq by mouth daily   warfarin (COUMADIN) 4 mg tablet   No No   Sig: TAKE 1 TABLET BY MOUTH DAILY ON SUNDAY TUESDAY AND THURSDAY   warfarin (COUMADIN) 6 mg tablet   No No   Sig: TAKE 1 TABLET BY MOUTH EVERY MONDAY, WEDNESDAY, FRIDAY AND SATURDAY      Facility-Administered Medications: None     Patient's Medications   Discharge Prescriptions    No medications on file     No discharge procedures on file.  ED SEPSIS DOCUMENTATION   Time reflects when diagnosis was documented in both MDM as applicable and the Disposition within this note       Time User Action Codes Description Comment    11/21/2024  8:40 AM Yuri Asencio [I10] Primary hypertension     11/21/2024  8:40 AM Yuri Asencio [Z86.39] History of hypothyroidism     11/21/2024  8:53 AM Yuri Asencio Remove [Z86.39] History of hypothyroidism     11/21/2024  8:53 AM Yuri Asencio [I48.11] Longstanding persistent atrial fibrillation (HCC)                  Yuri Asencio MD  11/21/24 0901       Yuri Asencio MD  11/21/24 0908

## 2024-12-10 LAB — INR PPP: 3.3 (ref 0.85–1.19)

## 2024-12-11 ENCOUNTER — ANTICOAG VISIT (OUTPATIENT)
Dept: CARDIOLOGY CLINIC | Facility: CLINIC | Age: 84
End: 2024-12-11

## 2024-12-11 DIAGNOSIS — I48.11 LONGSTANDING PERSISTENT ATRIAL FIBRILLATION (HCC): Primary | ICD-10-CM

## 2024-12-17 LAB — INR PPP: 3.1 (ref 0.85–1.19)

## 2024-12-18 ENCOUNTER — ANTICOAG VISIT (OUTPATIENT)
Dept: CARDIOLOGY CLINIC | Facility: CLINIC | Age: 84
End: 2024-12-18

## 2024-12-18 DIAGNOSIS — I48.11 LONGSTANDING PERSISTENT ATRIAL FIBRILLATION (HCC): Primary | ICD-10-CM

## 2024-12-31 LAB — INR PPP: 3 (ref 0.85–1.19)

## 2025-01-02 ENCOUNTER — ANTICOAG VISIT (OUTPATIENT)
Dept: CARDIOLOGY CLINIC | Facility: CLINIC | Age: 85
End: 2025-01-02

## 2025-01-02 DIAGNOSIS — I48.11 LONGSTANDING PERSISTENT ATRIAL FIBRILLATION (HCC): Primary | ICD-10-CM

## 2025-01-28 LAB — INR PPP: 3.3 (ref 0.85–1.19)

## 2025-01-29 ENCOUNTER — ANTICOAG VISIT (OUTPATIENT)
Dept: CARDIOLOGY CLINIC | Facility: CLINIC | Age: 85
End: 2025-01-29

## 2025-01-29 DIAGNOSIS — I48.11 LONGSTANDING PERSISTENT ATRIAL FIBRILLATION (HCC): Primary | ICD-10-CM

## 2025-02-07 DIAGNOSIS — I48.11 LONGSTANDING PERSISTENT ATRIAL FIBRILLATION (HCC): ICD-10-CM

## 2025-02-07 RX ORDER — WARFARIN SODIUM 4 MG/1
TABLET ORAL
Qty: 20 TABLET | Refills: 11 | Status: SHIPPED | OUTPATIENT
Start: 2025-02-07

## 2025-02-12 ENCOUNTER — ANTICOAG VISIT (OUTPATIENT)
Dept: CARDIOLOGY CLINIC | Facility: CLINIC | Age: 85
End: 2025-02-12

## 2025-02-12 DIAGNOSIS — I48.11 LONGSTANDING PERSISTENT ATRIAL FIBRILLATION (HCC): Primary | ICD-10-CM

## 2025-02-12 LAB — INR PPP: 3.3 (ref 0.85–1.19)

## 2025-02-25 LAB — INR PPP: 2.3 (ref 0.85–1.19)

## 2025-02-26 ENCOUNTER — ANTICOAG VISIT (OUTPATIENT)
Dept: CARDIOLOGY CLINIC | Facility: CLINIC | Age: 85
End: 2025-02-26

## 2025-02-26 DIAGNOSIS — I48.11 LONGSTANDING PERSISTENT ATRIAL FIBRILLATION (HCC): Primary | ICD-10-CM

## 2025-03-11 LAB — INR PPP: 3.5 (ref 0.85–1.19)

## 2025-03-12 ENCOUNTER — ANTICOAG VISIT (OUTPATIENT)
Dept: CARDIOLOGY CLINIC | Facility: CLINIC | Age: 85
End: 2025-03-12

## 2025-03-12 DIAGNOSIS — I48.11 LONGSTANDING PERSISTENT ATRIAL FIBRILLATION (HCC): Primary | ICD-10-CM

## 2025-03-16 ENCOUNTER — TELEPHONE (OUTPATIENT)
Dept: OTHER | Facility: OTHER | Age: 85
End: 2025-03-16

## 2025-03-16 NOTE — TELEPHONE ENCOUNTER
"Pt stated, \" I would like to reschedule my appointment that was recently cancelled.\"    Please follow up, thank you.   "

## 2025-03-26 ENCOUNTER — TELEPHONE (OUTPATIENT)
Age: 85
End: 2025-03-26

## 2025-03-26 NOTE — TELEPHONE ENCOUNTER
Charo from [facility] called in regard to this patient warfarin dosing and orders. Call was transferred to Khadra in the  coumadin clinic

## 2025-04-01 LAB — INR PPP: 2.9 (ref 0.85–1.19)

## 2025-04-02 ENCOUNTER — ANTICOAG VISIT (OUTPATIENT)
Dept: CARDIOLOGY CLINIC | Facility: CLINIC | Age: 85
End: 2025-04-02

## 2025-04-02 DIAGNOSIS — I48.11 LONGSTANDING PERSISTENT ATRIAL FIBRILLATION (HCC): Primary | ICD-10-CM

## 2025-04-04 DIAGNOSIS — I48.11 LONGSTANDING PERSISTENT ATRIAL FIBRILLATION (HCC): ICD-10-CM

## 2025-04-04 RX ORDER — WARFARIN SODIUM 4 MG/1
TABLET ORAL
Qty: 45 TABLET | Refills: 11 | Status: SHIPPED | OUTPATIENT
Start: 2025-04-04 | End: 2025-04-08

## 2025-04-08 DIAGNOSIS — I48.11 LONGSTANDING PERSISTENT ATRIAL FIBRILLATION (HCC): ICD-10-CM

## 2025-04-08 RX ORDER — WARFARIN SODIUM 4 MG/1
TABLET ORAL
Qty: 30 TABLET | Refills: 11 | Status: SHIPPED | OUTPATIENT
Start: 2025-04-08

## 2025-04-15 LAB — INR PPP: 3.1 (ref 0.85–1.19)

## 2025-04-16 ENCOUNTER — ANTICOAG VISIT (OUTPATIENT)
Dept: CARDIOLOGY CLINIC | Facility: CLINIC | Age: 85
End: 2025-04-16

## 2025-04-16 DIAGNOSIS — I48.11 LONGSTANDING PERSISTENT ATRIAL FIBRILLATION (HCC): Primary | ICD-10-CM

## 2025-04-18 DIAGNOSIS — I48.11 LONGSTANDING PERSISTENT ATRIAL FIBRILLATION (HCC): Primary | ICD-10-CM

## 2025-04-18 RX ORDER — WARFARIN SODIUM 2 MG/1
TABLET ORAL
Qty: 1 TABLET | Status: SHIPPED | OUTPATIENT
Start: 2025-04-18

## 2025-04-24 DIAGNOSIS — I48.11 LONGSTANDING PERSISTENT ATRIAL FIBRILLATION (HCC): ICD-10-CM

## 2025-04-24 RX ORDER — WARFARIN SODIUM 4 MG/1
TABLET ORAL
Qty: 30 TABLET | Refills: 11 | Status: SHIPPED | OUTPATIENT
Start: 2025-04-24

## 2025-04-29 LAB — INR PPP: 2.3 (ref 0.85–1.19)

## 2025-04-30 ENCOUNTER — ANTICOAG VISIT (OUTPATIENT)
Dept: CARDIOLOGY CLINIC | Facility: CLINIC | Age: 85
End: 2025-04-30

## 2025-04-30 DIAGNOSIS — I48.11 LONGSTANDING PERSISTENT ATRIAL FIBRILLATION (HCC): Primary | ICD-10-CM

## 2025-05-09 DIAGNOSIS — I48.11 LONGSTANDING PERSISTENT ATRIAL FIBRILLATION (HCC): ICD-10-CM

## 2025-05-09 RX ORDER — WARFARIN SODIUM 4 MG/1
4 TABLET ORAL DAILY
Qty: 30 TABLET | Refills: 5 | Status: SHIPPED | OUTPATIENT
Start: 2025-05-09

## 2025-05-20 ENCOUNTER — TELEPHONE (OUTPATIENT)
Dept: OTHER | Facility: OTHER | Age: 85
End: 2025-05-20

## 2025-05-20 LAB — INR PPP: 5.37 (ref 0.85–1.19)

## 2025-05-20 NOTE — TELEPHONE ENCOUNTER
If its not nursing home or Independent living, do they see PCP in Network: [facility]     Who is calling:Kathie PHILLIPS     Callback number: [phone]     Symptoms: Results     Paged oncall

## 2025-05-21 ENCOUNTER — ANTICOAG VISIT (OUTPATIENT)
Dept: CARDIOLOGY CLINIC | Facility: CLINIC | Age: 85
End: 2025-05-21

## 2025-05-21 ENCOUNTER — TELEPHONE (OUTPATIENT)
Dept: OTHER | Facility: OTHER | Age: 85
End: 2025-05-21

## 2025-05-21 NOTE — TELEPHONE ENCOUNTER
Lab Result: CRITICAL   Date/Time Drawn: 5/21/2025 / 12:00 pm   Ordering Provider: Rakesh Otero   Lab Personnel's Name: Chanell        Read back to the lab as documented above     [x]     Secure Chat message sent to on-call provider  [x]     Provider confirmed receipt of message     [x]

## 2025-05-23 DIAGNOSIS — I48.11 LONGSTANDING PERSISTENT ATRIAL FIBRILLATION (HCC): ICD-10-CM

## 2025-05-23 RX ORDER — WARFARIN SODIUM 2 MG/1
TABLET ORAL
Qty: 1 TABLET | Refills: 52 | Status: SHIPPED | OUTPATIENT
Start: 2025-05-23

## 2025-05-27 LAB — INR PPP: 2.97 (ref 0.85–1.19)

## 2025-05-29 ENCOUNTER — ANTICOAG VISIT (OUTPATIENT)
Dept: CARDIOLOGY CLINIC | Facility: CLINIC | Age: 85
End: 2025-05-29

## 2025-05-29 DIAGNOSIS — I48.91 ATRIAL FIBRILLATION, UNSPECIFIED TYPE (HCC): Primary | ICD-10-CM

## 2025-05-30 DIAGNOSIS — I48.11 LONGSTANDING PERSISTENT ATRIAL FIBRILLATION (HCC): ICD-10-CM

## 2025-05-30 RX ORDER — WARFARIN SODIUM 4 MG/1
TABLET ORAL
Qty: 3 TABLET | OUTPATIENT
Start: 2025-05-30

## 2025-06-02 DIAGNOSIS — I48.11 LONGSTANDING PERSISTENT ATRIAL FIBRILLATION (HCC): ICD-10-CM

## 2025-06-02 RX ORDER — WARFARIN SODIUM 4 MG/1
TABLET ORAL
Qty: 3 TABLET | OUTPATIENT
Start: 2025-06-02

## 2025-06-10 LAB — INR PPP: 2.66 (ref 0.85–1.19)

## 2025-06-11 ENCOUNTER — ANTICOAG VISIT (OUTPATIENT)
Dept: CARDIOLOGY CLINIC | Facility: CLINIC | Age: 85
End: 2025-06-11

## 2025-06-11 DIAGNOSIS — I48.91 ATRIAL FIBRILLATION, UNSPECIFIED TYPE (HCC): Primary | ICD-10-CM

## 2025-06-26 DIAGNOSIS — I48.11 LONGSTANDING PERSISTENT ATRIAL FIBRILLATION (HCC): ICD-10-CM

## 2025-06-26 NOTE — PROGRESS NOTES
"General Cardiology Outpatient Visit    Nola Welch 85 y.o. 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 However, on repeat check, BP at goal. Patient expressed concern over her noting high BP readings in the afternoon. Will prescribe as needed lisinopril 5 mg for systolic readings >160 mmHg.   RTC in 6 months.     Plan:  Chronic HFmrEF; LVEF 45-50%   Etiology: nonischemic.   Weight of 173 lbs on 09/13. Today, weighs 190 lbs.     Guideline-Directed Medical Therapy:  --Beta Blocker: carvedilol 12.5 mg BID.  --ARNi / ACEi / ARB: lisinopril 30 mg daily.  --Aldosterone Antagonist: No.  --SGLT2 Inhibitor: No.    Volume Management:  --Diuretic: Lasix 60 mg daily.  --K supplementation: potassium 20 mEq daily.    Sudden Cardiac Death Risk Reduction:  --LVEF >35%.    Atrial fibrillation, paroxysmal  Hypertension  Hyperlipidemia  Hypothyroidism  Cognitive impairment  History of TIA    HPI:   Nola Welch is an 85-year-old woman with a PMH as above who presents to the office for follow-up. Established with Dr. Mora in 09/2024 who recommended \"follow-up with the general cardiology team.\"    09/13/2024 with EJ: \"Warm, euvolemic  No new cardiac complaints, feels generally well  She feels much better since moving to NH, regular meds, regular meals, better sleep. Was living on her own before and there was much " "stress.  Weight diary stable    Neg NST ordered by another provider  We reviewed - reassuring     No Rx changes today     Fu BP trend with her PCP  Reduce anti-HTN Rx as indicated if BP trend drops or dizziness emerges in future.\"    2025: Patient presents for follow-up.  No current cardiac complaints.  Her primary concern today is of elevated BP readings at her facility for the past few months.  She noted this morning her blood pressure was 168/92 prior to morning medications.  She denies any symptoms with higher BP readings; denies headaches, chest pain, palpitations, SOB. Also denies lightheadedness, dizziness, lower extremity swelling, and orthopnea.    Past Medical History[1]    Review of Systems   Constitutional:  Negative for activity change, appetite change, fatigue and unexpected weight change.   Respiratory:  Negative for cough, chest tightness and shortness of breath.    Cardiovascular:  Negative for chest pain, palpitations and leg swelling.   Neurological:  Negative for dizziness, weakness and light-headedness.   Psychiatric/Behavioral:  The patient is not nervous/anxious.        Allergies[2]    Current Medications[3]    Social History     Socioeconomic History    Marital status:      Spouse name: Not on file    Number of children: Not on file    Years of education: Not on file    Highest education level: Not on file   Occupational History    Not on file   Tobacco Use    Smoking status: Former     Current packs/day: 0.00     Types: Cigarettes     Quit date:      Years since quittin.5    Smokeless tobacco: Never   Vaping Use    Vaping status: Never Used   Substance and Sexual Activity    Alcohol use: No    Drug use: No    Sexual activity: Not on file   Other Topics Concern    Not on file   Social History Narrative    Not on file     Social Drivers of Health     Financial Resource Strain: Not on file   Food Insecurity: No Food Insecurity (2024)    Nursing - Inadequate Food Risk " "Classification     Worried About Running Out of Food in the Last Year: Never true     Ran Out of Food in the Last Year: Never true     Ran Out of Food in the Last Year: Not on file   Transportation Needs: No Transportation Needs (7/5/2024)    PRAPARE - Transportation     Lack of Transportation (Medical): No     Lack of Transportation (Non-Medical): No   Physical Activity: Not on file   Stress: Not on file   Social Connections: Not on file   Intimate Partner Violence: Not on file   Housing Stability: Low Risk  (7/5/2024)    Housing Stability Vital Sign     Unable to Pay for Housing in the Last Year: No     Number of Times Moved in the Last Year: 0     Homeless in the Last Year: No     Family History[4]    Vitals:   Blood pressure 138/76, pulse (!) 49, height 5' 4\" (1.626 m), weight 86.5 kg (190 lb 9.6 oz), SpO2 95%, not currently breastfeeding.    Wt Readings from Last 10 Encounters:   06/27/25 86.5 kg (190 lb 9.6 oz)   05/14/25 83 kg (182 lb 15.7 oz)   11/21/24 83 kg (182 lb 15.7 oz)   09/13/24 78.6 kg (173 lb 3.2 oz)   08/12/24 74.4 kg (164 lb)   07/30/24 74.6 kg (164 lb 6.4 oz)   07/23/24 78.2 kg (172 lb 6.4 oz)   07/17/24 78.6 kg (173 lb 3.2 oz)   07/15/24 77.8 kg (171 lb 9.6 oz)   07/15/24 77.8 kg (171 lb 9.6 oz)     Vitals:    06/27/25 0842 06/27/25 0856   BP: (!) 150/104 138/76   BP Location: Right arm Right arm   Patient Position: Sitting Sitting   Cuff Size: Large Standard   Pulse: (!) 49    SpO2: 95%    Weight: 86.5 kg (190 lb 9.6 oz)    Height: 5' 4\" (1.626 m)        Physical Exam  Vitals reviewed.   Constitutional:       General: She is awake. She is not in acute distress.     Appearance: Normal appearance. She is well-developed and overweight. She is not toxic-appearing or diaphoretic.      Comments: Ambulating with walker   HENT:      Head: Normocephalic.      Nose: Nose normal.   Neck:      Vascular: No JVD.     Cardiovascular:      Rate and Rhythm: Bradycardia present. Rhythm irregularly irregular. "   Pulmonary:      Effort: Pulmonary effort is normal. No tachypnea, bradypnea or respiratory distress.      Breath sounds: Normal air entry. No wheezing or rales.   Abdominal:      General: There is no distension.     Musculoskeletal:      Cervical back: Neck supple.      Right lower leg: No edema.      Left lower leg: No edema.     Skin:     General: Skin is warm and dry.      Coloration: Skin is not jaundiced or pale.     Neurological:      Mental Status: She is alert.     Psychiatric:         Attention and Perception: Attention normal.         Mood and Affect: Mood normal.         Speech: Speech normal.         Behavior: Behavior normal. Behavior is cooperative.       Labs & Results:  Lab Results   Component Value Date    WBC 8.98 11/21/2024    HGB 14.1 11/21/2024    HCT 43.1 11/21/2024    MCV 89 11/21/2024     11/21/2024     Lab Results   Component Value Date    SODIUM 140 11/21/2024    K 3.9 11/21/2024     11/21/2024    CO2 28 11/21/2024    BUN 23 11/21/2024    CREATININE 0.78 11/21/2024    GLUC 144 (H) 11/21/2024    CALCIUM 10.4 (H) 11/21/2024     Lab Results   Component Value Date    INR 2.66 (A) 06/10/2025    INR 2.97 (A) 05/27/2025    INR 5.37 (A) 05/20/2025    PROTIME 30.5 (H) 11/21/2024    PROTIME 21.0 (H) 07/09/2024    PROTIME 19.9 (H) 07/08/2024     Lab Results   Component Value Date     (H) 07/03/2024      Sangita Koch PA-C       [1]   Past Medical History:  Diagnosis Date    Anxiety     Asthma     Atrial fibrillation (HCC)     Cardiac disease     CHF (congestive heart failure) (HCC)     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, 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) 07/03/2024    COVID-19 07/25/2024    Depression 05/22/2016    Fall 04/02/2023    HLD (hyperlipidemia) 05/22/2016    HTN (hypertension), benign 05/22/2016    Hyperlipidemia     Hypertension     Hypothyroidism 05/22/2016    Stroke (HCC)     Type 2 diabetes mellitus (HCC) 07/08/2024   [2]   Allergies  Allergen  Reactions    Furosemide Diarrhea     DIARRHEA    Penicillins Dermatitis    Wilfredo Castile Soap Rash    Soap Rash   [3]   Current Outpatient Medications:     atorvastatin (LIPITOR) 40 mg tablet, Take 40 mg by mouth in the morning., Disp: , Rfl:     carvedilol (COREG) 12.5 mg tablet, Take 1 tablet (12.5 mg total) by mouth 2 (two) times a day with meals, Disp: 60 tablet, Rfl: 0    cycloSPORINE (RESTASIS) 0.05 % ophthalmic emulsion, 1 drop 2 (two) times a day, Disp: , Rfl:     furosemide (LASIX) 40 mg tablet, Take 1.5 tablets (60 mg total) by mouth daily, Disp: 45 tablet, Rfl: 3    levothyroxine 50 mcg tablet, Take 50 mcg by mouth in the morning., Disp: , Rfl:     lisinopril (ZESTRIL) 30 mg tablet, Take 1 tablet (30 mg total) by mouth daily for 30 doses, Disp: 30 tablet, Rfl: 0    lisinopril (ZESTRIL) 5 mg tablet, Take 1 tablet (5 mg total) by mouth as needed (for systolic BP greater than 160 mmHg), Disp: 30 tablet, Rfl: 1    perphenazine-amitriptyline 2-10 MG TABS, Take 2 tablets by mouth in the morning., Disp: , Rfl:     potassium chloride (K-DUR) 10 mEq tablet, Take 20 mEq by mouth in the morning., Disp: , Rfl:     warfarin (COUMADIN) 2 mg tablet, Take one tablet daily on Thursdays or as directed by physician, Disp: 1 tablet, Rfl: 52    warfarin (COUMADIN) 4 mg tablet, Take 1 tablet (4 mg total) by mouth in the morning Take 1/2 to  1 tablet daily by mouth or as instructed by physician., Disp: 30 tablet, Rfl: 5    warfarin (COUMADIN) 6 mg tablet, TAKE 1 TABLET BY MOUTH EVERY MONDAY, WEDNESDAY, FRIDAY AND SATURDAY (Patient not taking: Reported on 6/27/2025), Disp: 4 tablet, Rfl: PRN  [4]   Family History  Problem Relation Name Age of Onset    No Known Problems Mother      No Known Problems Father      No Known Problems Sister daylin     Stomach cancer Maternal Grandmother  69    No Known Problems Maternal Grandfather      No Known Problems Paternal Grandmother      No Known Problems Paternal Grandfather

## 2025-06-27 ENCOUNTER — OFFICE VISIT (OUTPATIENT)
Dept: CARDIOLOGY CLINIC | Facility: CLINIC | Age: 85
End: 2025-06-27
Payer: MEDICARE

## 2025-06-27 VITALS
WEIGHT: 190.6 LBS | DIASTOLIC BLOOD PRESSURE: 76 MMHG | BODY MASS INDEX: 32.54 KG/M2 | SYSTOLIC BLOOD PRESSURE: 138 MMHG | HEIGHT: 64 IN | OXYGEN SATURATION: 95 % | HEART RATE: 49 BPM

## 2025-06-27 DIAGNOSIS — I50.22 HEART FAILURE WITH MID-RANGE EJECTION FRACTION (HCC): Primary | ICD-10-CM

## 2025-06-27 DIAGNOSIS — I10 PRIMARY HYPERTENSION: ICD-10-CM
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
## 2025-06-27 PROBLEM — U07.1 COVID-19: Status: RESOLVED | Noted: 2024-07-25 | Resolved: 2025-06-27

## 2025-06-27 PROCEDURE — 99214 OFFICE O/P EST MOD 30 MIN: CPT | Performed by: PHYSICIAN ASSISTANT

## 2025-06-27 RX ORDER — WARFARIN SODIUM 4 MG/1
TABLET ORAL
Qty: 24 TABLET | Refills: 6 | Status: SHIPPED | OUTPATIENT
Start: 2025-06-27

## 2025-06-27 RX ORDER — CYCLOSPORINE 0.5 MG/ML
1 EMULSION OPHTHALMIC 2 TIMES DAILY
COMMUNITY

## 2025-06-27 RX ORDER — LISINOPRIL 5 MG/1
5 TABLET ORAL AS NEEDED
Qty: 30 TABLET | Refills: 1 | Status: SHIPPED | OUTPATIENT
Start: 2025-06-27

## 2025-07-01 ENCOUNTER — TELEPHONE (OUTPATIENT)
Age: 85
End: 2025-07-01

## 2025-07-01 LAB — INR PPP: 2.9 (ref 0.85–1.19)

## 2025-07-01 NOTE — TELEPHONE ENCOUNTER
[facility]. [facility] would [facility] fax over orders for Sangita Koch PA-C to sign off on checking patient's blood pressure around 1 pm daily and giving Lisinopril 5 mg if needed for SBP > 160 at that time.    Fax number [phone] provided.   Maryann will fax orders.

## 2025-07-02 ENCOUNTER — TELEPHONE (OUTPATIENT)
Age: 85
End: 2025-07-02

## 2025-07-02 ENCOUNTER — ANTICOAG VISIT (OUTPATIENT)
Dept: CARDIOLOGY CLINIC | Facility: CLINIC | Age: 85
End: 2025-07-02

## 2025-07-02 DIAGNOSIS — I48.21 PERMANENT ATRIAL FIBRILLATION (HCC): Primary | ICD-10-CM

## 2025-07-02 NOTE — TELEPHONE ENCOUNTER
[facility] calling to verify we received the INR faxed yesterday.    Call transferred to [facility] for further assistance

## 2025-07-22 LAB — INR PPP: 3.01 (ref 0.85–1.19)

## 2025-07-23 ENCOUNTER — ANTICOAG VISIT (OUTPATIENT)
Dept: CARDIOLOGY CLINIC | Facility: CLINIC | Age: 85
End: 2025-07-23

## 2025-07-23 DIAGNOSIS — I48.91 ATRIAL FIBRILLATION, UNSPECIFIED TYPE (HCC): Primary | ICD-10-CM

## 2025-07-23 DIAGNOSIS — I48.11 LONGSTANDING PERSISTENT ATRIAL FIBRILLATION (HCC): ICD-10-CM

## 2025-07-25 RX ORDER — WARFARIN SODIUM 2 MG/1
TABLET ORAL
Qty: 30 TABLET | Refills: 11 | Status: SHIPPED | OUTPATIENT
Start: 2025-07-25

## 2025-08-01 DIAGNOSIS — I48.11 LONGSTANDING PERSISTENT ATRIAL FIBRILLATION (HCC): ICD-10-CM

## 2025-08-01 RX ORDER — WARFARIN SODIUM 4 MG/1
TABLET ORAL
Qty: 7 TABLET | OUTPATIENT
Start: 2025-08-01

## 2025-08-04 DIAGNOSIS — I48.11 LONGSTANDING PERSISTENT ATRIAL FIBRILLATION (HCC): ICD-10-CM

## 2025-08-05 LAB — INR PPP: 2.58 (ref 0.85–1.19)

## 2025-08-05 RX ORDER — WARFARIN SODIUM 4 MG/1
TABLET ORAL
Qty: 7 TABLET | OUTPATIENT
Start: 2025-08-05

## 2025-08-06 ENCOUNTER — ANTICOAG VISIT (OUTPATIENT)
Dept: CARDIOLOGY CLINIC | Facility: CLINIC | Age: 85
End: 2025-08-06

## 2025-08-06 DIAGNOSIS — I48.91 ATRIAL FIBRILLATION, UNSPECIFIED TYPE (HCC): Primary | ICD-10-CM

## 2025-08-12 ENCOUNTER — ANTICOAG VISIT (OUTPATIENT)
Dept: CARDIOLOGY CLINIC | Facility: CLINIC | Age: 85
End: 2025-08-12

## 2025-08-20 ENCOUNTER — ANTICOAG VISIT (OUTPATIENT)
Dept: CARDIOLOGY CLINIC | Facility: CLINIC | Age: 85
End: 2025-08-20

## 2025-08-20 DIAGNOSIS — I48.91 ATRIAL FIBRILLATION, UNSPECIFIED TYPE (HCC): Primary | ICD-10-CM

## 2025-08-20 LAB — INR PPP: 2.3 (ref 0.85–1.19)
